# Patient Record
Sex: FEMALE | Race: WHITE | Employment: OTHER | URBAN - METROPOLITAN AREA
[De-identification: names, ages, dates, MRNs, and addresses within clinical notes are randomized per-mention and may not be internally consistent; named-entity substitution may affect disease eponyms.]

---

## 2017-09-18 PROBLEM — I10 ESSENTIAL HYPERTENSION: Status: ACTIVE | Noted: 2017-09-18

## 2017-09-18 PROBLEM — M10.9 GOUT OF MULTIPLE SITES: Status: ACTIVE | Noted: 2017-09-18

## 2017-09-18 PROBLEM — M70.61 TROCHANTERIC BURSITIS, RIGHT HIP: Status: ACTIVE | Noted: 2017-09-18

## 2017-09-18 PROBLEM — E03.9 ACQUIRED HYPOTHYROIDISM: Status: ACTIVE | Noted: 2017-09-18

## 2018-04-09 ENCOUNTER — OFFICE VISIT (OUTPATIENT)
Dept: ORTHOPEDIC SURGERY | Age: 78
End: 2018-04-09
Payer: COMMERCIAL

## 2018-04-09 VITALS — WEIGHT: 190 LBS | BODY MASS INDEX: 34.96 KG/M2 | TEMPERATURE: 97.9 F | HEIGHT: 62 IN

## 2018-04-09 DIAGNOSIS — M70.61 TROCHANTERIC BURSITIS, RIGHT HIP: Primary | ICD-10-CM

## 2018-04-09 DIAGNOSIS — Z96.641 HISTORY OF TOTAL RIGHT HIP ARTHROPLASTY: ICD-10-CM

## 2018-04-09 PROCEDURE — 99214 OFFICE O/P EST MOD 30 MIN: CPT | Performed by: ORTHOPAEDIC SURGERY

## 2018-04-09 PROCEDURE — 20610 DRAIN/INJ JOINT/BURSA W/O US: CPT | Performed by: ORTHOPAEDIC SURGERY

## 2018-04-10 RX ORDER — TRIAMCINOLONE ACETONIDE 40 MG/ML
40 INJECTION, SUSPENSION INTRA-ARTICULAR; INTRAMUSCULAR ONCE
Status: COMPLETED | OUTPATIENT
Start: 2018-04-10 | End: 2018-04-10

## 2018-04-10 RX ADMIN — TRIAMCINOLONE ACETONIDE 40 MG: 40 INJECTION, SUSPENSION INTRA-ARTICULAR; INTRAMUSCULAR at 10:37

## 2018-05-08 ENCOUNTER — OFFICE VISIT (OUTPATIENT)
Dept: ORTHOPEDIC SURGERY | Age: 78
End: 2018-05-08
Payer: COMMERCIAL

## 2018-05-08 VITALS — BODY MASS INDEX: 34.96 KG/M2 | HEIGHT: 62 IN | WEIGHT: 190 LBS

## 2018-05-08 DIAGNOSIS — M70.61 TROCHANTERIC BURSITIS, RIGHT HIP: Primary | ICD-10-CM

## 2018-05-08 DIAGNOSIS — Z96.641 HISTORY OF TOTAL RIGHT HIP ARTHROPLASTY: ICD-10-CM

## 2018-05-08 PROCEDURE — 99213 OFFICE O/P EST LOW 20 MIN: CPT | Performed by: ORTHOPAEDIC SURGERY

## 2018-06-04 ENCOUNTER — OFFICE VISIT (OUTPATIENT)
Dept: ORTHOPEDIC SURGERY | Age: 78
End: 2018-06-04
Payer: COMMERCIAL

## 2018-06-04 DIAGNOSIS — M70.61 TROCHANTERIC BURSITIS, RIGHT HIP: Primary | ICD-10-CM

## 2018-06-04 DIAGNOSIS — Z96.641 HISTORY OF TOTAL RIGHT HIP ARTHROPLASTY: ICD-10-CM

## 2018-06-04 DIAGNOSIS — M48.061 SPINAL STENOSIS OF LUMBAR REGION WITHOUT NEUROGENIC CLAUDICATION: ICD-10-CM

## 2018-06-04 PROCEDURE — 99213 OFFICE O/P EST LOW 20 MIN: CPT | Performed by: ORTHOPAEDIC SURGERY

## 2018-07-03 ENCOUNTER — OFFICE VISIT (OUTPATIENT)
Dept: ORTHOPEDIC SURGERY | Age: 78
End: 2018-07-03
Payer: COMMERCIAL

## 2018-07-03 VITALS — HEIGHT: 62 IN | WEIGHT: 180 LBS | TEMPERATURE: 98 F | BODY MASS INDEX: 33.13 KG/M2

## 2018-07-03 DIAGNOSIS — M48.061 SPINAL STENOSIS OF LUMBAR REGION, UNSPECIFIED WHETHER NEUROGENIC CLAUDICATION PRESENT: Primary | ICD-10-CM

## 2018-07-03 DIAGNOSIS — M47.816 LUMBAR SPONDYLOSIS: ICD-10-CM

## 2018-07-03 PROCEDURE — 99213 OFFICE O/P EST LOW 20 MIN: CPT | Performed by: ORTHOPAEDIC SURGERY

## 2018-07-05 DIAGNOSIS — M48.061 SPINAL STENOSIS OF LUMBAR REGION, UNSPECIFIED WHETHER NEUROGENIC CLAUDICATION PRESENT: Primary | ICD-10-CM

## 2018-07-25 ENCOUNTER — OFFICE VISIT (OUTPATIENT)
Dept: PHYSICAL MEDICINE AND REHAB | Age: 78
End: 2018-07-25
Payer: COMMERCIAL

## 2018-07-25 VITALS
SYSTOLIC BLOOD PRESSURE: 127 MMHG | WEIGHT: 189 LBS | HEART RATE: 64 BPM | BODY MASS INDEX: 34.78 KG/M2 | DIASTOLIC BLOOD PRESSURE: 81 MMHG | HEIGHT: 62 IN

## 2018-07-25 DIAGNOSIS — M48.061 SPINAL STENOSIS OF LUMBAR REGION, UNSPECIFIED WHETHER NEUROGENIC CLAUDICATION PRESENT: ICD-10-CM

## 2018-07-25 DIAGNOSIS — M76.31 ILIOTIBIAL BAND SYNDROME OF RIGHT SIDE: ICD-10-CM

## 2018-07-25 DIAGNOSIS — M70.61 GREATER TROCHANTERIC BURSITIS, RIGHT: Primary | ICD-10-CM

## 2018-07-25 PROCEDURE — 99204 OFFICE O/P NEW MOD 45 MIN: CPT | Performed by: PHYSICAL MEDICINE & REHABILITATION

## 2018-07-25 NOTE — PATIENT INSTRUCTIONS
We appreciate that you chose [facility] to provide your healthcare needs today. We took great pleasure in caring for you. You may receive a survey to help us learn how to make your next visit to a [facility] facility better than the last.   Your feedback is important to help us continually improve the service we can provide you. Please take the time to complete it thoughtfully if you receive one as we value the feedback in every survey. In this survey we would appreciate that you answer \"always\" or \"yes\" for as many questions as possible (this is the answer we get credit for doing a good job). If you feel there is a question you cannot answer \"always\" or \"yes\", please let us know before you leave today so we can remedy the situation right away. We look forward to continuing to provide you with excellent care. Considering the survey questions related to access to care, please keep in mind the urgency of your problem (i.e. was it an emergent or urgent visit or more routine)  and whether the urgency of that problem was handled appropriately by our office. We always do our best to prioritize the patients who need the care most urgently given our specialty is in short supply and there is a high demand for visits. Thank you for your time and consideration.

## 2018-07-25 NOTE — PROGRESS NOTES
Viridiana Rose, [id number] [facility]  [address]  Phone: [phone]  Fax: [phone]    PCP: Matthew Nunes MD  Date of visit: 7/25/18    Chief Complaint   Patient presents with    Back Pain     new patient    Hip Pain       Dear Dr. Sohan Mabry,     Thank you for referring your patient to be seen. As you know,  Nessa Fitzpatrick is a 68 y.o. female with past medical history as below who presents with right hip and leg pain for 8 month(s). There was a gradual onset of pain after no known injury. Now, the pain is intermitten and occurs daily. The pain is rated Pain Score:   6, is described as ache, and is located in the right buttock  with radiation to the right hip into thigh-- only present when walking. The symptoms have been worse since onset. The pain is better with rest.  The pain is worse with walking. There is no associated numbness/tingling. There is no weakness. There is no bowel/bladder changes. Recently got a left heel lift. The prior workup has included: Xray, MRI    The prior treatment has included:  PT: none    Modalities: none    OTC Tylenol: none   NSAIDS: none    Membrane stabilizers: none   Muscle relaxers: none   Previous injections: GT bursa x 2.  Relief with first. Little with second    Previous surgery at this site: none    No Known Allergies    Current Outpatient Prescriptions   Medication Sig Dispense Refill    aspirin (ECOTRIN LOW STRENGTH) 81 MG EC tablet Take by mouth      atenolol (TENORMIN) 25 MG tablet Take 25 mg by mouth 2 times daily       amoxicillin (AMOXIL) 500 MG capsule TAKE FOUR CAPSULES BY MOUTH 1 HOUR BEFORE DENTAL APPOINTMENT  1    levothyroxine (SYNTHROID) 50 MCG tablet Take by mouth      metFORMIN (GLUCOPHAGE) 1000 MG tablet       rosuvastatin (CRESTOR) 5 MG tablet TAKE ONE TABLET BY MOUTH EVERY DAY  3    allopurinol (ZYLOPRIM) 300 MG tablet TAKE ONE TABLET BY MOUTH EVERY distress. HEENT: No rhinorrhea, sneezing, yawning, or lacrimation. No scleral icterus or conjunctival injection. Resp: symmetrical chest expansion, unlabored breathing, respirations unlabored. CV: Heart rate is regular. Peripheral pulses are palpable  Lymph: No visible regional lymphadenopathy. Skin: No rashes or ecchymosis. Normal turgor. Psych: Mood is calm. Affect is normal.   Ext: No edema noted     MSK:   Back/Hip Exam:   Inspection: Pelvis was symmetric. Lumbar lordotic curvature was increased. There was no scoliosis. No ecchymoses or erythema. Palpation: Palpatory exam revealed no tenderness along lumbosacral paraspinals, midline spine, SI joint sulcus. There was concordant tenderness over right GT and TFL. There was no paraspinal spasms. There were no trigger points. ROM: ROM lumbar decreased. Hip ROM normal.   Special/provocative testing:   Supine SLR negative   negative FABERS. DEREK's positive on right  There was a leg length discrepancy. Neurological Exam:  Strength:   R  L  Hip Flex  5  5  Knee Ext  5  5  Ankle dorsi  5  5  EHL   5 5  Ankle Plantar  5  5    Sensory:  Intact for light touch in all lower extremity dermatomes. Reflexes:   R  L  Patellar  (2+) (2+)  Ankle Jerk  (0) (0)      Gait is antalgic. Imaging: (personally reviewed by me 07/25/18)  MRI L spine 6/8/18: There is multilevel intervertebral disc desiccation. There is partial   solid osseous fusion at L5-S1.       T12-L1: No disc herniation. No central canal or foraminal narrowing.       L1-L2: Mild diffuse disc bulge. Mild bilateral facet hypertrophy. No   central canal or foraminal narrowing.       L2-L3: Mild diffuse disc bulge. Mild bilateral facet hypertrophy. No   central canal or foraminal narrowing.       L3-L4: Mild diffuse disc bulge. Moderate bilateral facet hypertrophy. Mild central canal narrowing. No foraminal narrowing.       L4-L5: Diffuse disc bulge.  Moderate bilateral facet hypertrophy with   fluid within the left facet joint. Moderate central canal and   bilateral subarticular recess narrowing. Moderate right and moderate   to severe left foraminal narrowing.       L5-S1: Partial solid osseous fusion. No central canal or foraminal   narrowing.       Visualized paravertebral soft tissues are grossly unremarkable. There   are multiple T2 hyperintense cystic lesions within bilateral kidneys.           Impression   1. Multilevel lumbar spondylosis as described above, most pronounced   at L4-L5, where there is a diffuse disc bulge and facet hypertrophy   resulting in moderate to severe left foraminal narrowing and moderate   central canal and right foraminal narrowing. 2. Mild central canal narrowing at L3-L4. 3. Partial solid osseous fusion at L5-S1. Impression:   Ilene Valdivia is a 68 y.o. female     1. Greater trochanteric bursitis, right    2. Iliotibial band syndrome of right side    3. Spinal stenosis of lumbar region, unspecified whether neurogenic claudication present        Plan:   · Refer to PT   · MRI lumbar reviewed. Depending on response to PT, discussed treatment options such as repeat GT bursa injection using US vs diagnostic epidural.     The patient was educated about the diagnosis, prognosis, indications, risks and benefits of treatment. An opportunity to ask questions was given to the patient and questions were answered. The patient agreed to proceed with the recommended treatment as described above. Follow up in 6 weeks      Thank you for the consultation and for allowing me to participate in the care of this patient.         Sincerely,     Memory DO Charles, [facility]   Board Certified Physical Medicine and Rehabilitation

## 2018-09-10 ENCOUNTER — OFFICE VISIT (OUTPATIENT)
Dept: PHYSICAL MEDICINE AND REHAB | Age: 78
End: 2018-09-10
Payer: COMMERCIAL

## 2018-09-10 VITALS
HEART RATE: 69 BPM | WEIGHT: 191 LBS | BODY MASS INDEX: 35.15 KG/M2 | HEIGHT: 62 IN | SYSTOLIC BLOOD PRESSURE: 154 MMHG | DIASTOLIC BLOOD PRESSURE: 84 MMHG

## 2018-09-10 DIAGNOSIS — M76.31 IT BAND SYNDROME, RIGHT: ICD-10-CM

## 2018-09-10 DIAGNOSIS — M70.61 TROCHANTERIC BURSITIS, RIGHT HIP: Primary | ICD-10-CM

## 2018-09-10 PROCEDURE — 99214 OFFICE O/P EST MOD 30 MIN: CPT | Performed by: PHYSICAL MEDICINE & REHABILITATION

## 2018-09-10 RX ORDER — LOSARTAN POTASSIUM 25 MG/1
25 TABLET ORAL DAILY
COMMUNITY
Start: 2018-09-02 | End: 2021-09-24

## 2018-09-10 NOTE — PROGRESS NOTES
well developed and well nourished in no acute distress. HEENT: No rhinorrhea, sneezing, yawning, or lacrimation. No scleral icterus or conjunctival injection. Resp: symmetrical chest expansion, unlabored breathing, respirations unlabored. CV: Heart rate is regular. Peripheral pulses are palpable  Lymph: No visible regional lymphadenopathy. Skin: No rashes or ecchymosis. Normal turgor. Psych: Mood is calm. Affect is normal.   Ext: No edema noted     MSK:   Back/Hip Exam:   Inspection: Pelvis was symmetric. Lumbar lordotic curvature was increased. There was no scoliosis. No ecchymoses or erythema. Palpation: Palpatory exam revealed no tenderness along lumbosacral paraspinals, midline spine, SI joint sulcus. There was concordant tenderness over right GT and TFL. There was no paraspinal spasms. There were no trigger points. ROM: ROM lumbar decreased. Hip ROM normal.   Special/provocative testing:   Supine SLR negative   negative FABERS. DEREK's positive on right  There was a leg length discrepancy. Neurological Exam:  Strength:   R  L  Hip Flex  5  5  Knee Ext  5  5  Ankle dorsi  5  5  EHL   5 5  Ankle Plantar  5  5    Sensory:  Intact for light touch in all lower extremity dermatomes. Reflexes:   R  L  Patellar  (2+) (2+)  Ankle Jerk  (0) (0)      Gait is antalgic. Imaging: (personally reviewed by me 09/10/18)  MRI L spine 6/8/18: There is multilevel intervertebral disc desiccation. There is partial   solid osseous fusion at L5-S1.       T12-L1: No disc herniation. No central canal or foraminal narrowing.       L1-L2: Mild diffuse disc bulge. Mild bilateral facet hypertrophy. No   central canal or foraminal narrowing.       L2-L3: Mild diffuse disc bulge. Mild bilateral facet hypertrophy. No   central canal or foraminal narrowing.       L3-L4: Mild diffuse disc bulge. Moderate bilateral facet hypertrophy. Mild central canal narrowing.  No foraminal narrowing.       L4-L5: Diffuse disc bulge. Moderate bilateral facet hypertrophy with   fluid within the left facet joint. Moderate central canal and   bilateral subarticular recess narrowing. Moderate right and moderate   to severe left foraminal narrowing.       L5-S1: Partial solid osseous fusion. No central canal or foraminal   narrowing.       Visualized paravertebral soft tissues are grossly unremarkable. There   are multiple T2 hyperintense cystic lesions within bilateral kidneys.           Impression   1. Multilevel lumbar spondylosis as described above, most pronounced   at L4-L5, where there is a diffuse disc bulge and facet hypertrophy   resulting in moderate to severe left foraminal narrowing and moderate   central canal and right foraminal narrowing. 2. Mild central canal narrowing at L3-L4. 3. Partial solid osseous fusion at L5-S1. Impression:   Reji Thapa is a 66 y.o. female     1. Trochanteric bursitis, right hip    2. It band syndrome, right        Plan:   · Continue PT-- added for them to do dry needling and US. Will monitor response. If still no improvement, will consider diagnostic epidural.       The patient was educated about the diagnosis, prognosis, indications, risks and benefits of treatment. An opportunity to ask questions was given to the patient and questions were answered. The patient agreed to proceed with the recommended treatment as described above.      · Follow up in 2-3 weeks       Layton Valdivia DO, [facility]   Board Certified Physical Medicine and Rehabilitation

## 2018-09-10 NOTE — PATIENT INSTRUCTIONS
We appreciate that you chose [facility] to provide your healthcare needs today. We took great pleasure in caring for you. You may receive a survey to help us learn how to make your next visit to a [facility] facility better than the last.   Your feedback is important to help us continually improve the service we can provide you. Please take the time to complete it thoughtfully if you receive one as we value the feedback in every survey. In this survey we would appreciate that you answer \"always\" or \"yes\" for as many questions as possible (this is the answer we get credit for doing a good job). If you feel there is a question you cannot answer \"always\" or \"yes\", please let us know before you leave today so we can remedy the situation right away. We look forward to continuing to provide you with excellent care. Considering the survey questions related to access to care, please keep in mind the urgency of your problem (i.e. was it an emergent or urgent visit or more routine)  and whether the urgency of that problem was handled appropriately by our office. We always do our best to prioritize the patients who need the care most urgently given our specialty is in short supply and there is a high demand for visits. Thank you for your time and consideration.

## 2018-09-24 ENCOUNTER — OFFICE VISIT (OUTPATIENT)
Dept: PHYSICAL MEDICINE AND REHAB | Age: 78
End: 2018-09-24
Payer: COMMERCIAL

## 2018-09-24 VITALS
SYSTOLIC BLOOD PRESSURE: 175 MMHG | HEART RATE: 65 BPM | WEIGHT: 188 LBS | BODY MASS INDEX: 34.6 KG/M2 | HEIGHT: 62 IN | DIASTOLIC BLOOD PRESSURE: 81 MMHG

## 2018-09-24 DIAGNOSIS — M70.61 TROCHANTERIC BURSITIS, RIGHT HIP: Primary | ICD-10-CM

## 2018-09-24 PROCEDURE — 20611 DRAIN/INJ JOINT/BURSA W/US: CPT | Performed by: PHYSICAL MEDICINE & REHABILITATION

## 2018-09-24 RX ORDER — BUPIVACAINE HYDROCHLORIDE 2.5 MG/ML
4 INJECTION, SOLUTION INFILTRATION; PERINEURAL ONCE
Status: COMPLETED | OUTPATIENT
Start: 2018-09-24 | End: 2018-09-24

## 2018-09-24 RX ORDER — TRIAMCINOLONE ACETONIDE 40 MG/ML
40 INJECTION, SUSPENSION INTRA-ARTICULAR; INTRAMUSCULAR ONCE
Status: COMPLETED | OUTPATIENT
Start: 2018-09-24 | End: 2018-09-24

## 2018-09-24 RX ADMIN — BUPIVACAINE HYDROCHLORIDE 10 MG: 2.5 INJECTION, SOLUTION INFILTRATION; PERINEURAL at 10:26

## 2018-09-24 RX ADMIN — TRIAMCINOLONE ACETONIDE 40 MG: 40 INJECTION, SUSPENSION INTRA-ARTICULAR; INTRAMUSCULAR at 10:27

## 2018-09-24 NOTE — PROGRESS NOTES
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 No pertinent family history. Social History     Social History    Marital status:      Spouse name: N/A    Number of children: N/A    Years of education: N/A     Occupational History    Not on file. Social History Main Topics    Smoking status: Never Smoker    Smokeless tobacco: Never Used    Alcohol use No    Drug use: No    Sexual activity: Not on file     Other Topics Concern    Not on file     Social History Narrative    No narrative on file       Functional Status: The patient is able to ambulate and perform activities of daily living with the use of an assistive device- straight cane. ROS:    Constitutional: Denies fevers, chills, night sweats, unintentional weight loss     Skin: Denies rash or skin changes     Eyes: Denies vision changes    Ears/Nose/Throat: Denies nasal congestion or sore throat     Respiratory: Denies SOB or cough     Cardiovascular: Denies CP, palpitations, edema      Gastrointestinal: Denies abdominal pain,  N/V, constipation, or diarrhea    Genitourinary: Denies urinary symptoms    Neurologic: See HPI.     MSK: See HPI.      Psychiatric: Denies sleep disturbance, anxiety, depression foraminal narrowing.       L3-L4: Mild diffuse disc bulge. Moderate bilateral facet hypertrophy. Mild central canal narrowing. No foraminal narrowing.       L4-L5: Diffuse disc bulge. Moderate bilateral facet hypertrophy with   fluid within the left facet joint. Moderate central canal and   bilateral subarticular recess narrowing. Moderate right and moderate   to severe left foraminal narrowing.       L5-S1: Partial solid osseous fusion. No central canal or foraminal   narrowing.       Visualized paravertebral soft tissues are grossly unremarkable. There   are multiple T2 hyperintense cystic lesions within bilateral kidneys.           Impression   1. Multilevel lumbar spondylosis as described above, most pronounced   at L4-L5, where there is a diffuse disc bulge and facet hypertrophy   resulting in moderate to severe left foraminal narrowing and moderate   central canal and right foraminal narrowing. 2. Mild central canal narrowing at L3-L4. 3. Partial solid osseous fusion at L5-S1. Impression:   Kwabena Sawyer is a 66 y.o. female     1. Trochanteric bursitis, right hip        Plan:   · Right greater trochanteric bursa injection under ultrasound guidance in office today. · Continue PT  · Start voltaren gel QID PRN       The patient was educated about the diagnosis, prognosis, indications, risks and benefits of treatment. An opportunity to ask questions was given to the patient and questions were answered. The patient agreed to proceed with the recommended treatment as described above. · Follow up in 1 month       Willard Suarez DO [facility]   Board Certified Physical Medicine and Rehabilitation      After explaining the indications, risks, benefits and alternatives of a right greater trochanteric bursa injection, the patient agreed to proceed. A permit was signed and scanned into the chart. The skin on the lateral hip was prepared with Chloraprep.   Using aseptic, no touch technique, a 22 gauge, 3.5\" needle with 1 cc of Kenalog 40mg/cc and 4 cc of 0.25% bupivacaine was directed to the bursa using US guidance. After negative aspiration, the medication was injected into the hip joint. Adequate hemostasis was achieved and a bandage was applied. The patient was monitored clinically after the injection and left the office without incident. The patient tolerated the procedure well and was educated in post injection care. There was post injection reduction in pain. US images are uploaded separately.

## 2018-09-24 NOTE — PATIENT INSTRUCTIONS
We appreciate that you chose [facility] to provide your healthcare needs today. We took great pleasure in caring for you. You may receive a survey to help us learn how to make your next visit to a [facility] facility better than the last.   Your feedback is important to help us continually improve the service we can provide you. Please take the time to complete it thoughtfully if you receive one as we value the feedback in every survey. In this survey we would appreciate that you answer \"always\" or \"yes\" for as many questions as possible (this is the answer we get credit for doing a good job). If you feel there is a question you cannot answer \"always\" or \"yes\", please let us know before you leave today so we can remedy the situation right away. We look forward to continuing to provide you with excellent care. Considering the survey questions related to access to care, please keep in mind the urgency of your problem (i.e. was it an emergent or urgent visit or more routine)  and whether the urgency of that problem was handled appropriately by our office. We always do our best to prioritize the patients who need the care most urgently given our specialty is in short supply and there is a high demand for visits. Thank you for your time and consideration.

## 2018-10-23 ENCOUNTER — OFFICE VISIT (OUTPATIENT)
Dept: PHYSICAL MEDICINE AND REHAB | Age: 78
End: 2018-10-23
Payer: COMMERCIAL

## 2018-10-23 VITALS
HEIGHT: 62 IN | BODY MASS INDEX: 34.41 KG/M2 | HEART RATE: 65 BPM | SYSTOLIC BLOOD PRESSURE: 137 MMHG | DIASTOLIC BLOOD PRESSURE: 82 MMHG | WEIGHT: 187 LBS

## 2018-10-23 DIAGNOSIS — M70.61 GREATER TROCHANTERIC BURSITIS, RIGHT: Primary | ICD-10-CM

## 2018-10-23 DIAGNOSIS — M76.31 ILIOTIBIAL BAND SYNDROME OF RIGHT SIDE: ICD-10-CM

## 2018-10-23 PROCEDURE — 99212 OFFICE O/P EST SF 10 MIN: CPT | Performed by: PHYSICAL MEDICINE & REHABILITATION

## 2018-10-23 NOTE — PROGRESS NOTES
Nikos Mcmanus, [address] [facility]  [address] [facility] [address]  Phone: [phone]  Fax: [phone]    PCP: Ghanshyam Louis MD  Date of visit: 10/23/18    Chief Complaint   Patient presents with    Hip Pain     1 month follow up    Leg Pain       Interval:   Patient presents for follow up visit today. Last visit she had a GT bursa injection with US. She reports complete relief of her pain. She reports no more aching in the leg. She completed PT with relief. The pain is rated Pain Score:   1 when it is there. No other issues or complaints today. The prior workup has included: Xray, MRI    The prior treatment has included:  PT: completed with relief    Modalities: none    OTC Tylenol: none   NSAIDS: none    Membrane stabilizers: none   Muscle relaxers: none   Previous injections: GT bursa x 2. Relief with first. GT under US- 100% relief     Previous surgery at this site: none    No Known Allergies    Current Outpatient Prescriptions   Medication Sig Dispense Refill    diclofenac sodium (VOLTAREN) 1 % GEL Apply 4 g topically 4 times daily as needed (pain) 480 g 2    losartan (COZAAR) 25 MG tablet Take 25 mg by mouth daily      aspirin (ECOTRIN LOW STRENGTH) 81 MG EC tablet Take by mouth      atenolol (TENORMIN) 25 MG tablet Take 25 mg by mouth 2 times daily       amoxicillin (AMOXIL) 500 MG capsule TAKE FOUR CAPSULES BY MOUTH 1 HOUR BEFORE DENTAL APPOINTMENT  1    levothyroxine (SYNTHROID) 50 MCG tablet Take by mouth      metFORMIN (GLUCOPHAGE) 1000 MG tablet       rosuvastatin (CRESTOR) 5 MG tablet TAKE ONE TABLET BY MOUTH EVERY DAY  3    allopurinol (ZYLOPRIM) 300 MG tablet TAKE ONE TABLET BY MOUTH EVERY DAY  3     No current facility-administered medications for this visit.         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

## 2019-01-08 NOTE — PROGRESS NOTES
How Severe Is Your Acne?: mild Is This A New Presentation, Or A Follow-Up?: Acne Additional Comments (Use Complete Sentences): Pt presents today with cyst acne in back. States going to urgent care and was treated with DOXYCYCLINE for 14 days cleared but returned. there is not a deformity noted. Patient complains of tenderness at the  groin. Exam of the right hip shows right leg 1 cm longer than left discrepancy. Range of motion of the involved/uninvolved hip is 20 degrees internal rotation and 30 degrees external rotation/20 degrees internal rotation and 30 degrees external rotation, the hip joint is stable to testing. Strength of the lower extremity is limited by pain. The patient does not have ipsilateral knee pain, and is described as  diffuse. Hip exam- Gait: antalgic; Strength: Hip Flexors 5/5; Hip Abductors 5/5; Hip Adduction 5/5. Knee exam :    Upon visual inspection there is not deformity noted. She does not have  pain on motion, there is not tenderness over the  global region. Range of motion of R. Knee is 0 to 120, and L. Knee is 0 to 120. there are not any masses, there is not ligamentous instability, there is not  deformity noted. MRI:  Lumbar spine-      1. Multilevel lumbar spondylosis as described above, most pronounced   at L4-L5, where there is a diffuse disc bulge and facet hypertrophy   resulting in moderate to severe left foraminal narrowing and moderate   central canal and right foraminal narrowing. 2. Mild central canal narrowing at L3-L4. 3. Partial solid osseous fusion at L5-S1. Assessment:  Encounter Diagnoses   Name Primary?  Spinal stenosis of lumbar region, unspecified whether neurogenic claudication present Yes    Lumbar spondylosis          Plan:  I discussed the treatment options with the patient. She would like to continue to monitor her condition. If she does not improve, I can refer her to Dr. [place] Dod[place] and Dr. Jose Juan Lara for further evaluation.

## 2020-02-19 ENCOUNTER — HOSPITAL ENCOUNTER (OUTPATIENT)
Age: 80
Discharge: HOME | End: 2020-02-21
Payer: COMMERCIAL

## 2020-02-19 LAB
ALBUMIN SERPL-MCNC: 4.2 G/DL (ref 3.5–5.2)
ALP BLD-CCNC: 50 U/L (ref 35–104)
ALT SERPL-CCNC: 24 U/L (ref 0–32)
ANION GAP SERPL CALCULATED.3IONS-SCNC: 15 MMOL/L (ref 7–16)
AST SERPL-CCNC: 25 U/L (ref 0–31)
BILIRUB SERPL-MCNC: 0.2 MG/DL (ref 0–1.2)
BUN BLDV-MCNC: 28 MG/DL (ref 8–23)
CALCIUM SERPL-MCNC: 9.8 MG/DL (ref 8.6–10.2)
CHLORIDE BLD-SCNC: 100 MMOL/L (ref 98–107)
CO2: 24 MMOL/L (ref 22–29)
CREAT SERPL-MCNC: 1.2 MG/DL (ref 0.5–1)
GFR AFRICAN AMERICAN: 52
GFR NON-AFRICAN AMERICAN: 43 ML/MIN/1.73
GLUCOSE BLD-MCNC: 126 MG/DL (ref 74–99)
POTASSIUM SERPL-SCNC: 4.6 MMOL/L (ref 3.5–5)
SODIUM BLD-SCNC: 139 MMOL/L (ref 132–146)
TOTAL PROTEIN: 7.6 G/DL (ref 6.4–8.3)

## 2020-02-19 PROCEDURE — 80053 COMPREHEN METABOLIC PANEL: CPT

## 2020-02-19 PROCEDURE — 83036 HEMOGLOBIN GLYCOSYLATED A1C: CPT

## 2020-02-20 LAB — HBA1C MFR BLD: 7 % (ref 4–5.6)

## 2021-09-24 ENCOUNTER — OFFICE VISIT (OUTPATIENT)
Dept: FAMILY MEDICINE CLINIC | Age: 81
End: 2021-09-24
Payer: COMMERCIAL

## 2021-09-24 VITALS
DIASTOLIC BLOOD PRESSURE: 75 MMHG | OXYGEN SATURATION: 98 % | SYSTOLIC BLOOD PRESSURE: 130 MMHG | WEIGHT: 188 LBS | HEART RATE: 57 BPM | BODY MASS INDEX: 34.6 KG/M2 | TEMPERATURE: 96.8 F | HEIGHT: 62 IN

## 2021-09-24 DIAGNOSIS — R73.9 HYPERGLYCEMIA: ICD-10-CM

## 2021-09-24 DIAGNOSIS — I10 ESSENTIAL HYPERTENSION: Primary | ICD-10-CM

## 2021-09-24 DIAGNOSIS — E03.9 ACQUIRED HYPOTHYROIDISM: ICD-10-CM

## 2021-09-24 DIAGNOSIS — E78.5 HYPERLIPIDEMIA, UNSPECIFIED HYPERLIPIDEMIA TYPE: ICD-10-CM

## 2021-09-24 DIAGNOSIS — Z76.89 ENCOUNTER TO ESTABLISH CARE: ICD-10-CM

## 2021-09-24 PROCEDURE — 99204 OFFICE O/P NEW MOD 45 MIN: CPT | Performed by: FAMILY MEDICINE

## 2021-09-24 RX ORDER — AMLODIPINE BESYLATE 5 MG/1
5 TABLET ORAL DAILY
Qty: 90 TABLET | Refills: 0 | Status: SHIPPED
Start: 2021-09-24 | End: 2022-03-04 | Stop reason: SDUPTHER

## 2021-09-24 RX ORDER — ROSUVASTATIN CALCIUM 5 MG/1
TABLET, COATED ORAL
Qty: 90 TABLET | Refills: 0 | Status: SHIPPED
Start: 2021-09-24 | End: 2022-03-04 | Stop reason: SDUPTHER

## 2021-09-24 RX ORDER — LEVOTHYROXINE SODIUM 0.05 MG/1
50 TABLET ORAL DAILY
Qty: 90 TABLET | Refills: 0 | Status: SHIPPED
Start: 2021-09-24 | End: 2021-12-06 | Stop reason: SDUPTHER

## 2021-09-24 RX ORDER — AMLODIPINE BESYLATE 5 MG/1
5 TABLET ORAL DAILY
COMMUNITY
Start: 2021-07-26 | End: 2021-09-24 | Stop reason: SDUPTHER

## 2021-09-24 SDOH — ECONOMIC STABILITY: FOOD INSECURITY: WITHIN THE PAST 12 MONTHS, THE FOOD YOU BOUGHT JUST DIDN'T LAST AND YOU DIDN'T HAVE MONEY TO GET MORE.: NEVER TRUE

## 2021-09-24 SDOH — ECONOMIC STABILITY: FOOD INSECURITY: WITHIN THE PAST 12 MONTHS, YOU WORRIED THAT YOUR FOOD WOULD RUN OUT BEFORE YOU GOT MONEY TO BUY MORE.: NEVER TRUE

## 2021-09-24 ASSESSMENT — ENCOUNTER SYMPTOMS
ALLERGIC/IMMUNOLOGIC NEGATIVE: 1
GASTROINTESTINAL NEGATIVE: 1
EYES NEGATIVE: 1
RESPIRATORY NEGATIVE: 1

## 2021-09-24 ASSESSMENT — PATIENT HEALTH QUESTIONNAIRE - PHQ9
SUM OF ALL RESPONSES TO PHQ9 QUESTIONS 1 & 2: 0
SUM OF ALL RESPONSES TO PHQ QUESTIONS 1-9: 0
SUM OF ALL RESPONSES TO PHQ QUESTIONS 1-9: 0
2. FEELING DOWN, DEPRESSED OR HOPELESS: 0
SUM OF ALL RESPONSES TO PHQ QUESTIONS 1-9: 0
1. LITTLE INTEREST OR PLEASURE IN DOING THINGS: 0

## 2021-09-24 ASSESSMENT — SOCIAL DETERMINANTS OF HEALTH (SDOH): HOW HARD IS IT FOR YOU TO PAY FOR THE VERY BASICS LIKE FOOD, HOUSING, MEDICAL CARE, AND HEATING?: NOT HARD AT ALL

## 2021-09-24 NOTE — PATIENT INSTRUCTIONS
Continue present treatment  Low-sodium low-fat diabetic diet  Regular exercises  Get the lab work done today  Return to clinic earlier if any problems

## 2021-09-24 NOTE — PROGRESS NOTES
OFFICE PROGRESS NOTE      SUBJECTIVE:        Patient ID:   Pierce Zavala is a 80 y.o. female who presents for   Chief Complaint   Patient presents with   [facility]     Here to establish self as a patient. HPI:   Patient states is feeling okay  Previous records been reviewed  Patient did not have the lab work in a while  No specific complaints today  Pressure is stable        Prior to Visit Medications    Medication Sig Taking?  Authorizing Provider   amLODIPine (NORVASC) 5 MG tablet Take 5 mg by mouth daily Yes Historical Provider, MD   diclofenac sodium (VOLTAREN) 1 % GEL Apply 4 g topically 4 times daily as needed (pain) Yes Connie Mon, DO   aspirin (ECOTRIN LOW STRENGTH) 81 MG EC tablet Take by mouth Yes Historical Provider, MD   atenolol (TENORMIN) 25 MG tablet Take 25 mg by mouth 2 times daily  Yes Historical Provider, MD   levothyroxine (SYNTHROID) 50 MCG tablet Take 50 mcg by mouth Daily  Yes Historical Provider, MD   metFORMIN (GLUCOPHAGE) 1000 MG tablet 1,000 mg 2 times daily (with meals)  Yes Historical Provider, MD   rosuvastatin (CRESTOR) 5 MG tablet TAKE ONE TABLET BY MOUTH EVERY DAY Yes Historical Provider, MD      Social History     Socioeconomic History    Marital status:      Spouse name: None    Number of children: None    Years of education: None    Highest education level: None   Occupational History    None   Tobacco Use    Smoking status: Never Smoker    Smokeless tobacco: Never Used   Substance and Sexual Activity    Alcohol use: No    Drug use: No    Sexual activity: None   Other Topics Concern    None   Social History Narrative    None     Social Determinants of Health     Financial Resource Strain: Low Risk     Difficulty of Paying Living Expenses: Not hard at all   Food Insecurity: No Food Insecurity    Worried About Running Out of Food in the Last Year: Never true    Rashawn of Food in the Last Year: Never true   Transportation Needs:     Lack of Transportation (Medical):  Lack of Transportation (Non-Medical):    Physical Activity:     Days of Exercise per Week:     Minutes of Exercise per Session:    Stress:     Feeling of Stress :    Social Connections:     Frequency of Communication with Friends and Family:     Frequency of Social Gatherings with Friends and Family:     Attends Mu-ism Services:     Active Member of Clubs or Organizations:     Attends Club or Organization Meetings:     Marital Status:    Intimate Partner Violence:     Fear of Current or Ex-Partner:     Emotionally Abused:     Physically Abused:     Sexually Abused:        I have reviewed Bernadette's allergies, medications, problem list, medical, social and family history and have updated as needed in the electronic medical record  Review Of Systems:    Review of Systems   Constitutional: Negative. HENT: Negative. Eyes: Negative. Respiratory: Negative. Cardiovascular: Negative. Gastrointestinal: Negative. Endocrine: Negative. Musculoskeletal: Negative. Skin: Negative. Allergic/Immunologic: Negative. Neurological: Negative. Hematological: Negative. Psychiatric/Behavioral: Negative. OBJECTIVE:     VS:  Wt Readings from Last 3 Encounters:   09/24/21 188 lb (85.3 kg)   10/23/18 187 lb (84.8 kg)   09/24/18 188 lb (85.3 kg)     Temp Readings from Last 3 Encounters:   09/24/21 96.8 °F (36 °C) (Infrared)   07/03/18 98 °F (36.7 °C)   04/09/18 97.9 °F (36.6 °C)     BP Readings from Last 3 Encounters:   09/24/21 130/75   10/23/18 137/82   09/24/18 (!) 175/81        Physical Exam  Constitutional:       Appearance: She is well-developed. HENT:      Head: Normocephalic and atraumatic. Eyes:      Conjunctiva/sclera: Conjunctivae normal.      Pupils: Pupils are equal, round, and reactive to light. Cardiovascular:      Rate and Rhythm: Normal rate and regular rhythm.       Heart sounds: Normal heart sounds. Pulmonary:      Effort: Pulmonary effort is normal.      Breath sounds: Normal breath sounds. Abdominal:      General: Bowel sounds are normal.      Palpations: Abdomen is soft. Musculoskeletal:         General: Normal range of motion. Cervical back: Normal range of motion and neck supple. Skin:     General: Skin is warm and dry. Neurological:      Mental Status: She is alert and oriented to person, place, and time. Psychiatric:         Thought Content: Thought content normal.            Labs :    Lab Results   Component Value Date    ALT 24 02/19/2020    AST 25 02/19/2020     02/19/2020    K 4.6 02/19/2020     02/19/2020    CREATININE 1.2 (H) 02/19/2020    BUN 28 (H) 02/19/2020    CO2 24 02/19/2020    LABA1C 7.0 (H) 02/19/2020     No results found for: VOL, APPEARANCE, COLORU, LABSPEC, LABPH, LEUKBLD, NITRU, GLUCOSEU, KETUA, UROBILINOGEN, KETUA, UROBILINOGEN, BILIRUBINUR, OCBU  No results found for: PSA, CEA, , [id number],       Controlled Substances Monitoring:                                    ASSESSMENT     Patient Active Problem List    Diagnosis Date Noted    Lumbar spondylosis 07/03/2018    Spinal stenosis of lumbar region 07/03/2018    Essential hypertension 09/18/2017    Gout of multiple sites 09/18/2017    Acquired hypothyroidism 09/18/2017    Trochanteric bursitis, right hip 09/18/2017    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 Essential hypertension  I10    2. Encounter to establish care  Z76.89    3. Acquired hypothyroidism  E03.9 TSH   4. Hyperlipidemia, unspecified hyperlipidemia type  E78.5 CBC WITH AUTO DIFFERENTIAL     COMPREHENSIVE METABOLIC PANEL     LIPID PANEL   5.  Hyperglycemia  R73.9 HEMOGLOBIN A1C       PLAN:     Continue present treatment  Low-sodium low-fat diabetic diet  Regular exercises  Lab work before the next visit  Return to clinic earlier if any problems      Patient Instructions   Continue present treatment  Low-sodium low-fat diabetic diet  Regular exercises  Get the lab work done today  Return to clinic earlier if any problems      Return in about 2 weeks (around 10/8/2021). Joe uMrray reviewed my findings and recommendations with Lory Norris.     Electronicallysigned by Grecia Briscoe MD on 9/24/21 at 11:34 AM EDT

## 2021-10-22 ENCOUNTER — OFFICE VISIT (OUTPATIENT)
Dept: FAMILY MEDICINE CLINIC | Age: 81
End: 2021-10-22
Payer: COMMERCIAL

## 2021-10-22 VITALS
OXYGEN SATURATION: 97 % | TEMPERATURE: 97 F | BODY MASS INDEX: 34.85 KG/M2 | SYSTOLIC BLOOD PRESSURE: 136 MMHG | DIASTOLIC BLOOD PRESSURE: 80 MMHG | HEART RATE: 81 BPM | WEIGHT: 189 LBS

## 2021-10-22 DIAGNOSIS — E78.5 HYPERLIPIDEMIA, UNSPECIFIED HYPERLIPIDEMIA TYPE: ICD-10-CM

## 2021-10-22 DIAGNOSIS — E03.9 ACQUIRED HYPOTHYROIDISM: ICD-10-CM

## 2021-10-22 DIAGNOSIS — R73.9 HYPERGLYCEMIA: ICD-10-CM

## 2021-10-22 DIAGNOSIS — I10 ESSENTIAL HYPERTENSION: Primary | ICD-10-CM

## 2021-10-22 PROCEDURE — 99214 OFFICE O/P EST MOD 30 MIN: CPT | Performed by: FAMILY MEDICINE

## 2021-10-22 ASSESSMENT — ENCOUNTER SYMPTOMS
EYES NEGATIVE: 1
ALLERGIC/IMMUNOLOGIC NEGATIVE: 1
RESPIRATORY NEGATIVE: 1
BACK PAIN: 1
GASTROINTESTINAL NEGATIVE: 1

## 2021-10-22 NOTE — PATIENT INSTRUCTIONS
Continue present treatment  Low-sodium low-fat diabetic diet  Weight reduction  Lab work before the next visit  Return to clinic earlier if any problems

## 2021-10-22 NOTE — PROGRESS NOTES
OFFICE PROGRESS NOTE      SUBJECTIVE:        Patient ID:   Clem Yeh is a 80 y.o. female who presents for   Chief Complaint   Patient presents with    Hypertension     Here for recheck on Hypertension,Hyperlipidemia,Hypothyroidism and DM. Patient reports feeling well without any complaints. Lab work done,here for review. HPI:   Lab showed elevated lipids  CMP normal  Thyroid is normal  Patient not following the diet  Does not exercise exercise        Prior to Visit Medications    Medication Sig Taking?  Authorizing Provider   rosuvastatin (CRESTOR) 5 MG tablet TAKE ONE TABLET BY MOUTH EVERY DAY Yes Agustin Mccain MD   metFORMIN (GLUCOPHAGE) 1000 MG tablet Take 1 tablet by mouth 2 times daily (with meals) Yes Agustin Mccain MD   levothyroxine (SYNTHROID) 50 MCG tablet Take 1 tablet by mouth Daily Yes Agustin Mccain MD   amLODIPine (NORVASC) 5 MG tablet Take 1 tablet by mouth daily Yes Agustin Mccain MD   diclofenac sodium (VOLTAREN) 1 % GEL Apply 4 g topically 4 times daily as needed (pain) Yes Elvie Foley DO   aspirin (ECOTRIN LOW STRENGTH) 81 MG EC tablet Take by mouth Yes Historical Provider, MD   atenolol (TENORMIN) 25 MG tablet Take 25 mg by mouth 2 times daily  Yes Historical Provider, MD      Social History     Socioeconomic History    Marital status:      Spouse name: None    Number of children: None    Years of education: None    Highest education level: None   Occupational History    None   Tobacco Use    Smoking status: Never Smoker    Smokeless tobacco: Never Used   Substance and Sexual Activity    Alcohol use: No    Drug use: No    Sexual activity: None   Other Topics Concern    None   Social History Narrative    None     Social Determinants of Health     Financial Resource Strain: Low Risk     Difficulty of Paying Living Expenses: Not hard at all   Food Insecurity: No Food Insecurity    Worried About Running Out of Food in the Last Year: Never true    Ran Out of Food in the Last Year: Never true   Transportation Needs:     Lack of Transportation (Medical):  Lack of Transportation (Non-Medical):    Physical Activity:     Days of Exercise per Week:     Minutes of Exercise per Session:    Stress:     Feeling of Stress :    Social Connections:     Frequency of Communication with Friends and Family:     Frequency of Social Gatherings with Friends and Family:     Attends Hinduism Services:     Active Member of Clubs or Organizations:     Attends Club or Organization Meetings:     Marital Status:    Intimate Partner Violence:     Fear of Current or Ex-Partner:     Emotionally Abused:     Physically Abused:     Sexually Abused:        I have reviewed Bernadette's allergies, medications, problem list, medical, social and family history and have updated as needed in the electronic medical record  Review Of Systems:    Review of Systems   Constitutional: Negative. HENT: Negative. Eyes: Negative. Respiratory: Negative. Cardiovascular: Negative. Gastrointestinal: Negative. Endocrine: Negative. Musculoskeletal: Positive for back pain. Skin: Negative. Allergic/Immunologic: Negative. Neurological: Negative. Hematological: Negative. Psychiatric/Behavioral: Negative. OBJECTIVE:     VS:  Wt Readings from Last 3 Encounters:   10/22/21 189 lb (85.7 kg)   09/24/21 188 lb (85.3 kg)   10/23/18 187 lb (84.8 kg)     Temp Readings from Last 3 Encounters:   10/22/21 97 °F (36.1 °C) (Infrared)   09/24/21 96.8 °F (36 °C) (Infrared)   07/03/18 98 °F (36.7 °C)     BP Readings from Last 3 Encounters:   10/22/21 136/80   09/24/21 130/75   10/23/18 137/82        Physical Exam  Constitutional:       Appearance: She is well-developed. HENT:      Head: Normocephalic and atraumatic.    Eyes:      Conjunctiva/sclera: Conjunctivae normal.      Pupils: Pupils are equal, round, and reactive to light. Cardiovascular:      Rate and Rhythm: Normal rate and regular rhythm. Heart sounds: Normal heart sounds. Pulmonary:      Effort: Pulmonary effort is normal.      Breath sounds: Normal breath sounds. Abdominal:      General: Bowel sounds are normal.      Palpations: Abdomen is soft. Musculoskeletal:         General: Normal range of motion. Cervical back: Normal range of motion and neck supple. Skin:     General: Skin is warm and dry. Neurological:      Mental Status: She is alert and oriented to person, place, and time. Psychiatric:         Thought Content: Thought content normal.            Labs :    Lab Results   Component Value Date    WBC 9.6 09/24/2021    HGB 14.5 09/24/2021    HCT 45.9 09/24/2021     09/24/2021    CHOL 193 09/24/2021    TRIG 287 (H) 09/24/2021    HDL 37 09/24/2021    ALT 37 (H) 09/24/2021    AST 44 (H) 09/24/2021     09/24/2021    K 5.4 (H) 09/24/2021     09/24/2021    CREATININE 1.3 (H) 09/24/2021    BUN 27 (H) 09/24/2021    CO2 25 09/24/2021    TSH 2.380 09/24/2021    LABA1C 7.7 (H) 09/24/2021     No results found for: VOL, APPEARANCE, COLORU, LABSPEC, LABPH, LEUKBLD, NITRU, GLUCOSEU, KETUA, UROBILINOGEN, KETUA, UROBILINOGEN, BILIRUBINUR, OCBU  No results found for: PSA, CEA, , [id number],       Controlled Substances Monitoring:                                    ASSESSMENT     Patient Active Problem List    Diagnosis Date Noted    Lumbar spondylosis 07/03/2018    Spinal stenosis of lumbar region 07/03/2018    Essential hypertension 09/18/2017    Gout of multiple sites 09/18/2017    Acquired hypothyroidism 09/18/2017    Trochanteric bursitis, right hip 09/18/2017    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 Essential hypertension  I10 CBC WITH AUTO DIFFERENTIAL   2. Acquired hypothyroidism  E03.9    3.  Hyperlipidemia, unspecified hyperlipidemia type  E78.5 CBC WITH AUTO DIFFERENTIAL     COMPREHENSIVE METABOLIC PANEL     LIPID PANEL   4. Hyperglycemia  R73.9 CBC WITH AUTO DIFFERENTIAL       PLAN:   Continue present treatment  Strict low-sodium low-fat diabetic diet  Regular exercises  Lab work before the next visit  Return to clinic earlier if any problems        Patient Instructions   Continue present treatment  Low-sodium low-fat diabetic diet  Weight reduction  Lab work before the next visit  Return to clinic earlier if any problems      Return in about 6 weeks (around 12/3/2021). Dayo Swanson reviewed my findings and recommendations with José Villegas.     Electronicallysigned by Marquita Bose MD on 10/22/21 at 11:44 AM EDT

## 2021-12-06 RX ORDER — LEVOTHYROXINE SODIUM 0.05 MG/1
50 TABLET ORAL DAILY
Qty: 90 TABLET | Refills: 0 | Status: SHIPPED
Start: 2021-12-06 | End: 2022-03-04 | Stop reason: SDUPTHER

## 2021-12-06 NOTE — TELEPHONE ENCOUNTER
----- Message from Kylah Lora sent at 12/6/2021  3:07 PM EST -----  Subject: Refill Request    QUESTIONS  Name of Medication? levothyroxine (SYNTHROID) 50 MCG tablet  Patient-reported dosage and instructions? 50MCG Tablets-- Take one by   mouth Three days per week and two by mouth four days per week   How many days do you have left? 5  Preferred Pharmacy? [address] [place] [address]  Pharmacy phone number (if available)? [phone]  ---------------------------------------------------------------------------  --------------  CALL BACK INFO  What is the best way for the office to contact you? OK to leave message on   voicemail  Preferred Call Back Phone Number?  [phone]

## 2021-12-07 RX ORDER — ATENOLOL 25 MG/1
25 TABLET ORAL 2 TIMES DAILY
Qty: 180 TABLET | Refills: 0 | Status: SHIPPED
Start: 2021-12-07 | End: 2022-03-04 | Stop reason: SDUPTHER

## 2021-12-29 DIAGNOSIS — E78.5 HYPERLIPIDEMIA, UNSPECIFIED HYPERLIPIDEMIA TYPE: ICD-10-CM

## 2021-12-29 DIAGNOSIS — I10 ESSENTIAL HYPERTENSION: ICD-10-CM

## 2021-12-29 DIAGNOSIS — R73.9 HYPERGLYCEMIA: ICD-10-CM

## 2021-12-29 LAB
ALBUMIN SERPL-MCNC: 4.4 G/DL (ref 3.5–5.2)
ALP BLD-CCNC: 50 U/L (ref 35–104)
ALT SERPL-CCNC: 31 U/L (ref 0–32)
ANION GAP SERPL CALCULATED.3IONS-SCNC: 16 MMOL/L (ref 7–16)
AST SERPL-CCNC: 32 U/L (ref 0–31)
BASOPHILS ABSOLUTE: 0.07 E9/L (ref 0–0.2)
BASOPHILS RELATIVE PERCENT: 0.8 % (ref 0–2)
BILIRUB SERPL-MCNC: 0.4 MG/DL (ref 0–1.2)
BUN BLDV-MCNC: 27 MG/DL (ref 6–23)
CALCIUM SERPL-MCNC: 10.5 MG/DL (ref 8.6–10.2)
CHLORIDE BLD-SCNC: 104 MMOL/L (ref 98–107)
CHOLESTEROL, TOTAL: 180 MG/DL (ref 0–199)
CO2: 24 MMOL/L (ref 22–29)
CREAT SERPL-MCNC: 1.5 MG/DL (ref 0.5–1)
EOSINOPHILS ABSOLUTE: 0.34 E9/L (ref 0.05–0.5)
EOSINOPHILS RELATIVE PERCENT: 3.9 % (ref 0–6)
GFR AFRICAN AMERICAN: 40
GFR NON-AFRICAN AMERICAN: 33 ML/MIN/1.73
GLUCOSE BLD-MCNC: 165 MG/DL (ref 74–99)
HCT VFR BLD CALC: 43.3 % (ref 34–48)
HDLC SERPL-MCNC: 42 MG/DL
HEMOGLOBIN: 13.4 G/DL (ref 11.5–15.5)
IMMATURE GRANULOCYTES #: 0.02 E9/L
IMMATURE GRANULOCYTES %: 0.2 % (ref 0–5)
LDL CHOLESTEROL CALCULATED: 95 MG/DL (ref 0–99)
LYMPHOCYTES ABSOLUTE: 1.56 E9/L (ref 1.5–4)
LYMPHOCYTES RELATIVE PERCENT: 18.1 % (ref 20–42)
MCH RBC QN AUTO: 27.6 PG (ref 26–35)
MCHC RBC AUTO-ENTMCNC: 30.9 % (ref 32–34.5)
MCV RBC AUTO: 89.1 FL (ref 80–99.9)
MONOCYTES ABSOLUTE: 0.82 E9/L (ref 0.1–0.95)
MONOCYTES RELATIVE PERCENT: 9.5 % (ref 2–12)
NEUTROPHILS ABSOLUTE: 5.83 E9/L (ref 1.8–7.3)
NEUTROPHILS RELATIVE PERCENT: 67.5 % (ref 43–80)
PDW BLD-RTO: 13.9 FL (ref 11.5–15)
PLATELET # BLD: 198 E9/L (ref 130–450)
PMV BLD AUTO: 11 FL (ref 7–12)
POTASSIUM SERPL-SCNC: 5 MMOL/L (ref 3.5–5)
RBC # BLD: 4.86 E12/L (ref 3.5–5.5)
SODIUM BLD-SCNC: 144 MMOL/L (ref 132–146)
TOTAL PROTEIN: 7.8 G/DL (ref 6.4–8.3)
TRIGL SERPL-MCNC: 217 MG/DL (ref 0–149)
VLDLC SERPL CALC-MCNC: 43 MG/DL
WBC # BLD: 8.6 E9/L (ref 4.5–11.5)

## 2022-01-07 ENCOUNTER — OFFICE VISIT (OUTPATIENT)
Dept: FAMILY MEDICINE CLINIC | Age: 82
End: 2022-01-07
Payer: MEDICARE

## 2022-01-07 VITALS
DIASTOLIC BLOOD PRESSURE: 80 MMHG | BODY MASS INDEX: 34.85 KG/M2 | SYSTOLIC BLOOD PRESSURE: 136 MMHG | TEMPERATURE: 97 F | HEART RATE: 68 BPM | WEIGHT: 189 LBS | OXYGEN SATURATION: 97 %

## 2022-01-07 DIAGNOSIS — N28.9 RENAL INSUFFICIENCY: ICD-10-CM

## 2022-01-07 DIAGNOSIS — R73.9 HYPERGLYCEMIA: ICD-10-CM

## 2022-01-07 DIAGNOSIS — E78.5 HYPERLIPIDEMIA, UNSPECIFIED HYPERLIPIDEMIA TYPE: ICD-10-CM

## 2022-01-07 DIAGNOSIS — I10 ESSENTIAL HYPERTENSION: Primary | ICD-10-CM

## 2022-01-07 DIAGNOSIS — E03.9 ACQUIRED HYPOTHYROIDISM: ICD-10-CM

## 2022-01-07 PROCEDURE — 99214 OFFICE O/P EST MOD 30 MIN: CPT | Performed by: FAMILY MEDICINE

## 2022-01-07 RX ORDER — GLIPIZIDE 5 MG/1
5 TABLET ORAL DAILY
Qty: 60 TABLET | Refills: 3 | Status: SHIPPED
Start: 2022-01-07 | End: 2022-03-04 | Stop reason: SDUPTHER

## 2022-01-07 ASSESSMENT — ENCOUNTER SYMPTOMS
RESPIRATORY NEGATIVE: 1
EYES NEGATIVE: 1
ALLERGIC/IMMUNOLOGIC NEGATIVE: 1
GASTROINTESTINAL NEGATIVE: 1

## 2022-01-07 NOTE — PROGRESS NOTES
OFFICE PROGRESS NOTE      SUBJECTIVE:        Patient ID:   Latonya Ruelas is a 80 y.o. female who presents for   Chief Complaint   Patient presents with    Hypertension     Here for recheck on Hypertension,Hyperlipidemia and Hypothyroidism. Patient reports feeling well. Lab work done,here for review. HPI:   Patient states is feeling good  Complaining of arthritic pains  Lab work showed elevated creatinine of 1.5  Blood sugar 165  Triglyceride 217  Patient states is not following the diet  Has not been exercising  Did not drink enough fluids        Prior to Visit Medications    Medication Sig Taking?  Authorizing Provider   atenolol (TENORMIN) 25 MG tablet Take 1 tablet by mouth 2 times daily Yes Tameka Jara MD   levothyroxine (SYNTHROID) 50 MCG tablet Take 1 tablet by mouth Daily Yes Tameka Jara MD   rosuvastatin (CRESTOR) 5 MG tablet TAKE ONE TABLET BY MOUTH EVERY DAY Yes Tameka Jara MD   metFORMIN (GLUCOPHAGE) 1000 MG tablet Take 1 tablet by mouth 2 times daily (with meals) Yes Tameka Jara MD   amLODIPine (NORVASC) 5 MG tablet Take 1 tablet by mouth daily Yes Tameka Jara MD   diclofenac sodium (VOLTAREN) 1 % GEL Apply 4 g topically 4 times daily as needed (pain) Yes Mike Nguyen, DO   aspirin (ECOTRIN LOW STRENGTH) 81 MG EC tablet Take by mouth Yes Historical Provider, MD      Social History     Socioeconomic History    Marital status:      Spouse name: None    Number of children: None    Years of education: None    Highest education level: None   Occupational History    None   Tobacco Use    Smoking status: Never Smoker    Smokeless tobacco: Never Used   Substance and Sexual Activity    Alcohol use: No    Drug use: No    Sexual activity: None   Other Topics Concern    None   Social History Narrative    None     Social Determinants of Health     Financial Resource Strain: Low Risk     Difficulty of Paying Living Expenses: Not hard at all   Food Insecurity: No Food Insecurity    Worried About Running Out of Food in the Last Year: Never true    Ran Out of Food in the Last Year: Never true   Transportation Needs:     Lack of Transportation (Medical): Not on file    Lack of Transportation (Non-Medical): Not on file   Physical Activity:     Days of Exercise per Week: Not on file    Minutes of Exercise per Session: Not on file   Stress:     Feeling of Stress : Not on file   Social Connections:     Frequency of Communication with Friends and Family: Not on file    Frequency of Social Gatherings with Friends and Family: Not on file    Attends Synagogue Services: Not on file    Active Member of [address] [place] [address] [employer] [place] or Organizations: Not on file    Attends Club or Organization Meetings: Not on file    Marital Status: Not on file   Intimate Partner Violence:     Fear of Current or Ex-Partner: Not on file    Emotionally Abused: Not on file    Physically Abused: Not on file    Sexually Abused: Not on file   Housing Stability:     Unable to Pay for Housing in the Last Year: Not on file    Number of [place] in the Last Year: Not on file    Unstable Housing in the Last Year: Not on file       I have reviewed Bernadette's allergies, medications, problem list, medical, social and family history and have updated as needed in the electronic medical record  Review Of Systems:    Review of Systems   Constitutional: Negative. HENT: Negative. Eyes: Negative. Respiratory: Negative. Cardiovascular: Negative. Gastrointestinal: Negative. Endocrine: Negative. Musculoskeletal: Positive for arthralgias. Skin: Negative. Allergic/Immunologic: Negative. Neurological: Negative. Hematological: Negative. Psychiatric/Behavioral: Negative.                OBJECTIVE:     VS:  Wt Readings from Last 3 Encounters:   01/07/22 189 lb (85.7 kg)   10/22/21 189 lb (85.7 kg)   09/24/21 188 lb (85.3 kg)     Temp Readings from Last 3 Encounters:   01/07/22 97 °F (36.1 °C) (Infrared)   10/22/21 97 °F (36.1 °C) (Infrared)   09/24/21 96.8 °F (36 °C) (Infrared)     BP Readings from Last 3 Encounters:   01/07/22 136/80   10/22/21 136/80   09/24/21 130/75        Physical Exam  Constitutional:       Appearance: She is well-developed. HENT:      Head: Normocephalic and atraumatic. Eyes:      Conjunctiva/sclera: Conjunctivae normal.      Pupils: Pupils are equal, round, and reactive to light. Cardiovascular:      Rate and Rhythm: Normal rate and regular rhythm. Heart sounds: Normal heart sounds. Pulmonary:      Effort: Pulmonary effort is normal.      Breath sounds: Normal breath sounds. Abdominal:      General: Bowel sounds are normal.      Palpations: Abdomen is soft. Musculoskeletal:         General: Normal range of motion. Cervical back: Normal range of motion and neck supple. Skin:     General: Skin is warm and dry. Neurological:      Mental Status: She is alert and oriented to person, place, and time. Psychiatric:         Thought Content:  Thought content normal.            Labs :    Lab Results   Component Value Date    WBC 8.6 12/29/2021    HGB 13.4 12/29/2021    HCT 43.3 12/29/2021     12/29/2021    CHOL 180 12/29/2021    TRIG 217 (H) 12/29/2021    HDL 42 12/29/2021    ALT 31 12/29/2021    AST 32 (H) 12/29/2021     12/29/2021    K 5.0 12/29/2021     12/29/2021    CREATININE 1.5 (H) 12/29/2021    BUN 27 (H) 12/29/2021    CO2 24 12/29/2021    TSH 2.380 09/24/2021    LABA1C 7.7 (H) 09/24/2021     No results found for: VOL, APPEARANCE, COLORU, LABSPEC, LABPH, LEUKBLD, NITRU, GLUCOSEU, KETUA, UROBILINOGEN, KETUA, UROBILINOGEN, BILIRUBINUR, OCBU  No results found for: PSA, CEA, , [id number],       Controlled Substances Monitoring:                                    ASSESSMENT     Patient Active Problem List    Diagnosis Date Noted    Lumbar spondylosis 07/03/2018    Spinal stenosis of lumbar region 07/03/2018    Essential hypertension 09/18/2017    Gout of multiple sites 09/18/2017    Acquired hypothyroidism 09/18/2017    Trochanteric bursitis, right hip 09/18/2017    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 Essential hypertension  I10    2. Acquired hypothyroidism  E03.9    3. Hyperlipidemia, unspecified hyperlipidemia type  E78.5 CBC WITH AUTO DIFFERENTIAL     COMPREHENSIVE METABOLIC PANEL     LIPID PANEL   4. Hyperglycemia  R73.9 COMPREHENSIVE METABOLIC PANEL   5. Renal insufficiency  N28.9 CBC WITH AUTO DIFFERENTIAL       PLAN:   Decrease the dose of Glucophage to 500 mg twice a day  Glucotrol 5 mg daily  Low-sodium low-fat diabetic diet  Blood sugar monitoring  Force fluids  Return to clinic earlier if any problems        Patient Instructions   Stop taking Glucophage 1000 mg twice a day  Start on Glucophage 500 mg twice a day  Start on Glucotrol 5 mg daily  Blood sugar monitoring  Low-sodium low-fat diet diabetic diet  Lab work before the next visit  Force fluids  Return to clinic earlier if any problems      Return in about 4 weeks (around 2/4/2022) for diabetes check, test results, Medication Check. Hina Rojo reviewed my findings and recommendations with Toyin Bernard.     Electronicallysigned by Anh Whiteside MD on 1/7/22 at 11:57 AM EST

## 2022-01-07 NOTE — PATIENT INSTRUCTIONS
Stop taking Glucophage 1000 mg twice a day  Start on Glucophage 500 mg twice a day  Start on Glucotrol 5 mg daily  Blood sugar monitoring  Low-sodium low-fat diet diabetic diet  Lab work before the next visit  Force fluids  Return to clinic earlier if any problems

## 2022-02-07 DIAGNOSIS — R73.9 HYPERGLYCEMIA: ICD-10-CM

## 2022-02-07 DIAGNOSIS — N28.9 RENAL INSUFFICIENCY: ICD-10-CM

## 2022-02-07 DIAGNOSIS — E78.5 HYPERLIPIDEMIA, UNSPECIFIED HYPERLIPIDEMIA TYPE: ICD-10-CM

## 2022-02-07 LAB
ALBUMIN SERPL-MCNC: 4.3 G/DL (ref 3.5–5.2)
ALP BLD-CCNC: 53 U/L (ref 35–104)
ALT SERPL-CCNC: 37 U/L (ref 0–32)
ANION GAP SERPL CALCULATED.3IONS-SCNC: 17 MMOL/L (ref 7–16)
AST SERPL-CCNC: 47 U/L (ref 0–31)
BASOPHILS ABSOLUTE: 0.05 E9/L (ref 0–0.2)
BASOPHILS RELATIVE PERCENT: 0.5 % (ref 0–2)
BILIRUB SERPL-MCNC: 0.4 MG/DL (ref 0–1.2)
BUN BLDV-MCNC: 26 MG/DL (ref 6–23)
CALCIUM SERPL-MCNC: 10.2 MG/DL (ref 8.6–10.2)
CHLORIDE BLD-SCNC: 103 MMOL/L (ref 98–107)
CHOLESTEROL, TOTAL: 188 MG/DL (ref 0–199)
CO2: 22 MMOL/L (ref 22–29)
CREAT SERPL-MCNC: 1.4 MG/DL (ref 0.5–1)
EOSINOPHILS ABSOLUTE: 0.25 E9/L (ref 0.05–0.5)
EOSINOPHILS RELATIVE PERCENT: 2.7 % (ref 0–6)
GFR AFRICAN AMERICAN: 44
GFR NON-AFRICAN AMERICAN: 36 ML/MIN/1.73
GLUCOSE BLD-MCNC: 158 MG/DL (ref 74–99)
HCT VFR BLD CALC: 44.1 % (ref 34–48)
HDLC SERPL-MCNC: 38 MG/DL
HEMOGLOBIN: 13.6 G/DL (ref 11.5–15.5)
IMMATURE GRANULOCYTES #: 0.04 E9/L
IMMATURE GRANULOCYTES %: 0.4 % (ref 0–5)
LDL CHOLESTEROL CALCULATED: 107 MG/DL (ref 0–99)
LYMPHOCYTES ABSOLUTE: 1.46 E9/L (ref 1.5–4)
LYMPHOCYTES RELATIVE PERCENT: 15.5 % (ref 20–42)
MCH RBC QN AUTO: 27.2 PG (ref 26–35)
MCHC RBC AUTO-ENTMCNC: 30.8 % (ref 32–34.5)
MCV RBC AUTO: 88.2 FL (ref 80–99.9)
MONOCYTES ABSOLUTE: 0.76 E9/L (ref 0.1–0.95)
MONOCYTES RELATIVE PERCENT: 8.1 % (ref 2–12)
NEUTROPHILS ABSOLUTE: 6.83 E9/L (ref 1.8–7.3)
NEUTROPHILS RELATIVE PERCENT: 72.8 % (ref 43–80)
PDW BLD-RTO: 14.2 FL (ref 11.5–15)
PLATELET # BLD: 209 E9/L (ref 130–450)
PMV BLD AUTO: 10.9 FL (ref 7–12)
POTASSIUM SERPL-SCNC: 5.3 MMOL/L (ref 3.5–5)
RBC # BLD: 5 E12/L (ref 3.5–5.5)
SODIUM BLD-SCNC: 142 MMOL/L (ref 132–146)
TOTAL PROTEIN: 7.6 G/DL (ref 6.4–8.3)
TRIGL SERPL-MCNC: 214 MG/DL (ref 0–149)
VLDLC SERPL CALC-MCNC: 43 MG/DL
WBC # BLD: 9.4 E9/L (ref 4.5–11.5)

## 2022-02-11 ENCOUNTER — OFFICE VISIT (OUTPATIENT)
Dept: FAMILY MEDICINE CLINIC | Age: 82
End: 2022-02-11
Payer: MEDICARE

## 2022-02-11 VITALS
SYSTOLIC BLOOD PRESSURE: 136 MMHG | OXYGEN SATURATION: 96 % | DIASTOLIC BLOOD PRESSURE: 78 MMHG | TEMPERATURE: 96.6 F | HEART RATE: 71 BPM | WEIGHT: 192 LBS | BODY MASS INDEX: 35.4 KG/M2

## 2022-02-11 DIAGNOSIS — E03.9 ACQUIRED HYPOTHYROIDISM: ICD-10-CM

## 2022-02-11 DIAGNOSIS — E66.01 SEVERE OBESITY (BMI 35.0-39.9) WITH COMORBIDITY (HCC): ICD-10-CM

## 2022-02-11 DIAGNOSIS — E78.5 HYPERLIPIDEMIA, UNSPECIFIED HYPERLIPIDEMIA TYPE: ICD-10-CM

## 2022-02-11 DIAGNOSIS — R73.9 HYPERGLYCEMIA: ICD-10-CM

## 2022-02-11 DIAGNOSIS — N28.9 RENAL INSUFFICIENCY: ICD-10-CM

## 2022-02-11 DIAGNOSIS — I10 ESSENTIAL HYPERTENSION: Primary | ICD-10-CM

## 2022-02-11 DIAGNOSIS — E11.9 TYPE 2 DIABETES MELLITUS WITHOUT COMPLICATION, WITHOUT LONG-TERM CURRENT USE OF INSULIN (HCC): ICD-10-CM

## 2022-02-11 PROCEDURE — 99214 OFFICE O/P EST MOD 30 MIN: CPT | Performed by: FAMILY MEDICINE

## 2022-02-11 ASSESSMENT — ENCOUNTER SYMPTOMS
GASTROINTESTINAL NEGATIVE: 1
ALLERGIC/IMMUNOLOGIC NEGATIVE: 1
RESPIRATORY NEGATIVE: 1
EYES NEGATIVE: 1

## 2022-02-11 NOTE — PROGRESS NOTES
OFFICE PROGRESS NOTE      SUBJECTIVE:        Patient ID:   Shankar Rutherford is a 80 y.o. female who presents for   Chief Complaint   Patient presents with    Hypertension     Here for recheck on Hypertension,Hypothyroidism and DM. Lab work done,here for review. HPI:   Patient here for a follow-up   present with the patient   said patient is getting forgetful  Lab work reviewed  Creatinine 1.4  Potassium 5.3  Lipids still elevated  Fasting sugar 158  Patient not following the diet  Patient not exercising        Prior to Visit Medications    Medication Sig Taking?  Authorizing Provider   metFORMIN (GLUCOPHAGE) 500 MG tablet Take 1 tablet by mouth 2 times daily (with meals) Yes Valencia Ryan MD   glipiZIDE (GLUCOTROL) 5 MG tablet Take 1 tablet by mouth daily Yes Valencia Ryan MD   atenolol (TENORMIN) 25 MG tablet Take 1 tablet by mouth 2 times daily Yes Valencia Ryan MD   levothyroxine (SYNTHROID) 50 MCG tablet Take 1 tablet by mouth Daily Yes Valencia Ryan MD   rosuvastatin (CRESTOR) 5 MG tablet TAKE ONE TABLET BY MOUTH EVERY DAY Yes Valencia Ryan MD   amLODIPine (NORVASC) 5 MG tablet Take 1 tablet by mouth daily Yes Valencia Ryan MD   diclofenac sodium (VOLTAREN) 1 % GEL Apply 4 g topically 4 times daily as needed (pain) Yes Alicia Buenrostro DO   aspirin (ECOTRIN LOW STRENGTH) 81 MG EC tablet Take by mouth Yes Historical Provider, MD      Social History     Socioeconomic History    Marital status:      Spouse name: None    Number of children: None    Years of education: None    Highest education level: None   Occupational History    None   Tobacco Use    Smoking status: Never Smoker    Smokeless tobacco: Never Used   Substance and Sexual Activity    Alcohol use: No    Drug use: No    Sexual activity: None   Other Topics Concern    None   Social History Narrative    None     Social Determinants of Health Financial Resource Strain: Low Risk     Difficulty of Paying Living Expenses: Not hard at all   Food Insecurity: No Food Insecurity    Worried About Running Out of Food in the Last Year: Never true    Rashawn of Food in the Last Year: Never true   Transportation Needs:     Lack of Transportation (Medical): Not on file    Lack of Transportation (Non-Medical): Not on file   Physical Activity:     Days of Exercise per Week: Not on file    Minutes of Exercise per Session: Not on file   Stress:     Feeling of Stress : Not on file   Social Connections:     Frequency of Communication with Friends and Family: Not on file    Frequency of Social Gatherings with Friends and Family: Not on file    Attends Taoist Services: Not on file    Active Member of [address] [place] [address] [employer] or Organizations: Not on file    Attends Club or Organization Meetings: Not on file    Marital Status: Not on file   Intimate Partner Violence:     Fear of Current or Ex-Partner: Not on file    Emotionally Abused: Not on file    Physically Abused: Not on file    Sexually Abused: Not on file   Housing Stability:     Unable to Pay for Housing in the Last Year: Not on file    Number of [place] in the Last Year: Not on file    Unstable Housing in the Last Year: Not on file       I have reviewed Bernadette's allergies, medications, problem list, medical, social and family history and have updated as needed in the electronic medical record  Review Of Systems:    Review of Systems   Constitutional: Negative. HENT: Negative. Eyes: Negative. Respiratory: Negative. Cardiovascular: Negative. Gastrointestinal: Negative. Endocrine: Negative. Musculoskeletal: Negative. Skin: Negative. Allergic/Immunologic: Negative. Neurological: Negative. Hematological: Negative. Psychiatric/Behavioral: Negative.                OBJECTIVE:     VS:  Wt Readings from Last 3 Encounters:   02/11/22 192 lb (87.1 kg)   01/07/22 189 lb (85.7 kg) 10/22/21 189 lb (85.7 kg)     Temp Readings from Last 3 Encounters:   02/11/22 96.6 °F (35.9 °C) (Infrared)   01/07/22 97 °F (36.1 °C) (Infrared)   10/22/21 97 °F (36.1 °C) (Infrared)     BP Readings from Last 3 Encounters:   02/11/22 136/78   01/07/22 136/80   10/22/21 136/80        Physical Exam  Constitutional:       Appearance: She is well-developed. HENT:      Head: Normocephalic and atraumatic. Eyes:      Conjunctiva/sclera: Conjunctivae normal.      Pupils: Pupils are equal, round, and reactive to light. Cardiovascular:      Rate and Rhythm: Normal rate and regular rhythm. Heart sounds: Normal heart sounds. Pulmonary:      Effort: Pulmonary effort is normal.      Breath sounds: Normal breath sounds. Abdominal:      General: Bowel sounds are normal.      Palpations: Abdomen is soft. Musculoskeletal:         General: Normal range of motion. Cervical back: Normal range of motion and neck supple. Skin:     General: Skin is warm and dry. Neurological:      Mental Status: She is alert and oriented to person, place, and time. Psychiatric:         Thought Content:  Thought content normal.            Labs :    Lab Results   Component Value Date    WBC 9.4 02/07/2022    HGB 13.6 02/07/2022    HCT 44.1 02/07/2022     02/07/2022    CHOL 188 02/07/2022    TRIG 214 (H) 02/07/2022    HDL 38 02/07/2022    ALT 37 (H) 02/07/2022    AST 47 (H) 02/07/2022     02/07/2022    K 5.3 (H) 02/07/2022     02/07/2022    CREATININE 1.4 (H) 02/07/2022    BUN 26 (H) 02/07/2022    CO2 22 02/07/2022    TSH 2.380 09/24/2021    LABA1C 7.7 (H) 09/24/2021     No results found for: VOL, APPEARANCE, COLORU, LABSPEC, LABPH, LEUKBLD, NITRU, GLUCOSEU, KETUA, UROBILINOGEN, KETUA, UROBILINOGEN, BILIRUBINUR, OCBU  No results found for: PSA, CEA, , [id number],       Controlled Substances Monitoring:                                    ASSESSMENT     Patient Active Problem List    Diagnosis Date Noted  Type 2 diabetes mellitus 02/11/2022    Lumbar spondylosis 07/03/2018    Spinal stenosis of lumbar region 07/03/2018    Essential hypertension 09/18/2017    Gout of multiple sites 09/18/2017    Acquired hypothyroidism 09/18/2017    Trochanteric bursitis, right hip 09/18/2017    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 Essential hypertension  I10 CBC WITH AUTO DIFFERENTIAL     COMPREHENSIVE METABOLIC PANEL   2. Type 2 diabetes mellitus without complication, without long-term current use of insulin (HCC)  E11.9 COMPREHENSIVE METABOLIC PANEL   3. Acquired hypothyroidism  E03.9    4. Hyperlipidemia, unspecified hyperlipidemia type  E78.5 COMPREHENSIVE METABOLIC PANEL     LIPID PANEL   5. Hyperglycemia  R73.9 COMPREHENSIVE METABOLIC PANEL   6. Renal insufficiency  N28.9 CBC WITH AUTO DIFFERENTIAL     COMPREHENSIVE METABOLIC PANEL   7. Severe obesity (BMI 35.0-39. 9) with comorbidity ([address]  E66.01        PLAN:   Continue present treatment  Force fluids  Nephrology referral patient wants to wait  Low-sodium low-fat diabetic weight reduction diet  Regular exercises  Return to clinic earlier if any problems        Patient Instructions   Continue present treatment  Low-sodium low-fat diabetic weight reduction diet  Regular exercises  Return to clinic earlier if any problem  If the renal function gets any worse will refer you to the nephrologist  Bring in all the medication at the next visit        Return in about 2 weeks (around 2/25/2022) for annaul visit. Jeremías Doran reviewed my findings and recommendations with Rosalie Morley.     Electronicallysigned by August Miller MD on 2/11/22 at 10:38 AM EST

## 2022-03-04 ENCOUNTER — OFFICE VISIT (OUTPATIENT)
Dept: FAMILY MEDICINE CLINIC | Age: 82
End: 2022-03-04
Payer: MEDICARE

## 2022-03-04 VITALS
SYSTOLIC BLOOD PRESSURE: 120 MMHG | BODY MASS INDEX: 34.96 KG/M2 | WEIGHT: 190 LBS | DIASTOLIC BLOOD PRESSURE: 72 MMHG | HEART RATE: 72 BPM | HEIGHT: 62 IN | TEMPERATURE: 96.5 F | OXYGEN SATURATION: 99 %

## 2022-03-04 DIAGNOSIS — Z00.00 INITIAL MEDICARE ANNUAL WELLNESS VISIT: ICD-10-CM

## 2022-03-04 DIAGNOSIS — E78.49 OTHER HYPERLIPIDEMIA: ICD-10-CM

## 2022-03-04 DIAGNOSIS — G30.9 ALZHEIMER'S DISEASE, UNSPECIFIED (CODE) (HCC): ICD-10-CM

## 2022-03-04 DIAGNOSIS — E03.8 OTHER SPECIFIED HYPOTHYROIDISM: ICD-10-CM

## 2022-03-04 DIAGNOSIS — R73.9 HYPERGLYCEMIA: ICD-10-CM

## 2022-03-04 DIAGNOSIS — E03.9 ACQUIRED HYPOTHYROIDISM: ICD-10-CM

## 2022-03-04 DIAGNOSIS — I10 ESSENTIAL HYPERTENSION: Primary | ICD-10-CM

## 2022-03-04 PROCEDURE — G0438 PPPS, INITIAL VISIT: HCPCS | Performed by: FAMILY MEDICINE

## 2022-03-04 RX ORDER — ROSUVASTATIN CALCIUM 5 MG/1
TABLET, COATED ORAL
Qty: 90 TABLET | Refills: 1 | Status: SHIPPED | OUTPATIENT
Start: 2022-03-04

## 2022-03-04 RX ORDER — DONEPEZIL HYDROCHLORIDE 5 MG/1
5 TABLET, FILM COATED ORAL NIGHTLY
Qty: 30 TABLET | Refills: 3 | Status: SHIPPED
Start: 2022-03-04 | End: 2022-04-01 | Stop reason: SDUPTHER

## 2022-03-04 RX ORDER — GLUCOSAMINE HCL/CHONDROITIN SU 500-400 MG
1 CAPSULE ORAL
COMMUNITY
End: 2022-04-01 | Stop reason: SDUPTHER

## 2022-03-04 RX ORDER — AMLODIPINE BESYLATE 5 MG/1
5 TABLET ORAL DAILY
Qty: 90 TABLET | Refills: 1 | Status: SHIPPED
Start: 2022-03-04 | End: 2022-08-05 | Stop reason: SDUPTHER

## 2022-03-04 RX ORDER — LEVOTHYROXINE SODIUM 0.05 MG/1
50 TABLET ORAL DAILY
Qty: 90 TABLET | Refills: 1 | Status: SHIPPED
Start: 2022-03-04 | End: 2022-08-05

## 2022-03-04 RX ORDER — GLIPIZIDE 5 MG/1
5 TABLET ORAL DAILY
Qty: 180 TABLET | Refills: 1 | Status: SHIPPED | OUTPATIENT
Start: 2022-03-04

## 2022-03-04 RX ORDER — ATENOLOL 25 MG/1
25 TABLET ORAL 2 TIMES DAILY
Qty: 180 TABLET | Refills: 1 | Status: SHIPPED
Start: 2022-03-04 | End: 2022-10-26 | Stop reason: SDUPTHER

## 2022-03-04 ASSESSMENT — PATIENT HEALTH QUESTIONNAIRE - PHQ9
SUM OF ALL RESPONSES TO PHQ QUESTIONS 1-9: 0
2. FEELING DOWN, DEPRESSED OR HOPELESS: 0
1. LITTLE INTEREST OR PLEASURE IN DOING THINGS: 0
SUM OF ALL RESPONSES TO PHQ QUESTIONS 1-9: 0
SUM OF ALL RESPONSES TO PHQ9 QUESTIONS 1 & 2: 0

## 2022-03-04 ASSESSMENT — LIFESTYLE VARIABLES: HOW OFTEN DO YOU HAVE A DRINK CONTAINING ALCOHOL: NEVER

## 2022-03-04 NOTE — PATIENT INSTRUCTIONS
Personalized Preventive Plan for [facility] - 3/4/2022  Medicare offers a range of preventive health benefits. Some of the tests and screenings are paid in full while other may be subject to a deductible, co-insurance, and/or copay. Some of these benefits include a comprehensive review of your medical history including lifestyle, illnesses that may run in your family, and various assessments and screenings as appropriate. After reviewing your medical record and screening and assessments performed today your provider may have ordered immunizations, labs, imaging, and/or referrals for you. A list of these orders (if applicable) as well as your Preventive Care list are included within your After Visit Summary for your review. Other Preventive Recommendations:    · A preventive eye exam performed by an eye specialist is recommended every 1-2 years to screen for glaucoma; cataracts, macular degeneration, and other eye disorders. · A preventive dental visit is recommended every 6 months. · Try to get at least 150 minutes of exercise per week or 10,000 steps per day on a pedometer . · Order or download the FREE \"Exercise & Physical Activity: Your Everyday Guide\" from [employer]. Call 3-710.138.5103 or search [employer] online. · You need 7565-0545 mg of calcium and 2437-1923 IU of vitamin D per day. It is possible to meet your calcium requirement with diet alone, but a vitamin D supplement is usually necessary to meet this goal.  · When exposed to the sun, use a sunscreen that protects against both UVA and UVB radiation with an SPF of 30 or greater. Reapply every 2 to 3 hours or after sweating, drying off with a towel, or swimming. · Always wear a seat belt when traveling in a car. Always wear a helmet when riding a bicycle or motorcycle.

## 2022-03-04 NOTE — PROGRESS NOTES
[facility]  Medicare Annual Wellness Visit    Nerissa Levine is here for Medicare AWV (Here for AWV)    Yessenia Cramer was seen today for medicare awv. Diagnoses and all orders for this visit:    Essential hypertension  -     atenolol (TENORMIN) 25 MG tablet; Take 1 tablet by mouth 2 times daily  -     amLODIPine (NORVASC) 5 MG tablet; Take 1 tablet by mouth daily    Initial Medicare annual wellness visit    Hyperglycemia  -     metFORMIN (GLUCOPHAGE) 500 MG tablet; Take 1 tablet by mouth 2 times daily (with meals)  -     glipiZIDE (GLUCOTROL) 5 MG tablet; Take 1 tablet by mouth daily    Acquired hypothyroidism  -     levothyroxine (SYNTHROID) 50 MCG tablet; Take 1 tablet by mouth Daily    Other specified hypothyroidism    Other hyperlipidemia  -     rosuvastatin (CRESTOR) 5 MG tablet; TAKE ONE TABLET BY MOUTH EVERY DAY    Alzheimer's disease, unspecified    Other orders  -     donepezil (ARICEPT) 5 MG tablet; Take 1 tablet by mouth nightly         Recommendations for Preventive Services Due: see orders and patient instructions/AVS.  Recommended screening schedule for the next 5-10 years is provided to the patient in written form: see Patient Instructions/AVS.     Return in 4 weeks (on 4/1/2022) for Medicare Annual Wellness Visit in 1 year, Medication Check. Subjective   The following acute and/or chronic problems were also addressed today:      Patient's complete Health Risk Assessment and screening values have been reviewed and are found in Flowsheets. The following problems were reviewed today and where indicated follow up appointments were made and/or referrals ordered. Positive Risk Factor Screenings with Interventions:     Cognitive:   Words recalled: 0 Words Recalled  Clock Drawing Test (CDT): Normal  Total Score Interpretation: Abnormal Mini-Cog  Did the patient refuse to take the cognition test?: No    Cognitive Impairment Interventions:  · Patient declines any further evaluation/treatment for cognitive impairment             General Health and ACP:  General  In general, how would you say your health is?: Good  In the past 7 days, have you experienced any of the following: New or Increased Pain, New or Increased Fatigue, Loneliness, Social Isolation, Stress or Anger?: No  Do you get the social and emotional support that you need?: Yes  Do you have a Living Will?: Yes    Advance Directives     Power of  Living Will ACP-Advance Directive ACP-Power of     Not on File Not on File Not on File Not on File      General Health Risk Interventions:  · No definite issues    Health Habits/Nutrition:     Physical Activity: Inactive    Days of Exercise per Week: 0 days    Minutes of Exercise per Session: 0 min     Have you lost any weight without trying in the past 3 months?: No  Body mass index: (!) 34.75  Have you seen the dentist within the past year?: Yes    Health Habits/Nutrition Interventions:  · Nutritional issues:  educational materials for healthy, well-balanced diet provided             Objective   Vitals:    03/04/22 1022 03/04/22 1035   BP: (!) 150/100 120/72   Pulse: 72    Temp: 96.5 °F (35.8 °C)    TempSrc: Infrared    SpO2: 99%    Weight: 190 lb (86.2 kg)    Height: 5' 2\" (1.575 m)       Body mass index is 34.75 kg/m². No Known Allergies  Prior to Visit Medications    Medication Sig Taking? Authorizing Provider   blood glucose monitor strips 1 strip by Other route Test 2 times a day & as needed for symptoms of irregular blood glucose. Dispense sufficient amount for indicated testing frequency plus additional to accommodate PRN testing needs.  Yes Historical Provider, MD   atenolol (TENORMIN) 25 MG tablet Take 1 tablet by mouth 2 times daily Yes William Hernandez MD   levothyroxine (SYNTHROID) 50 MCG tablet Take 1 tablet by mouth Daily Yes William Hernandez MD   metFORMIN (GLUCOPHAGE) 500 MG tablet Take 1 tablet by mouth 2 times daily (with meals) Yes Alyssia Rose MD   rosuvastatin (CRESTOR) 5 MG tablet TAKE ONE TABLET BY MOUTH EVERY DAY Yes Alyssia Rose MD   glipiZIDE (GLUCOTROL) 5 MG tablet Take 1 tablet by mouth daily Yes Alyssia Rose MD   amLODIPine (NORVASC) 5 MG tablet Take 1 tablet by mouth daily Yes Alyssia Rose MD   donepezil (ARICEPT) 5 MG tablet Take 1 tablet by mouth nightly Yes Alyssia Rose MD   diclofenac sodium (VOLTAREN) 1 % GEL Apply 4 g topically 4 times daily as needed (pain) Yes Elsinore Bless, DO   aspirin (ECOTRIN LOW STRENGTH) 81 MG EC tablet Take by mouth Yes Historical Provider, MD Chen (Including outside providers/suppliers regularly involved in providing care):   Patient Care Team:  Alyssia Rose MD as PCP - General (Family Medicine)  Alyssia Rose MD as PCP - [facility] Empaneled Provider    Reviewed and updated this visit:  Tobacco  Allergies  Meds  Problems  Med Hx  Surg Hx  Soc Hx  Fam Hx

## 2022-04-01 ENCOUNTER — OFFICE VISIT (OUTPATIENT)
Dept: FAMILY MEDICINE CLINIC | Age: 82
End: 2022-04-01
Payer: MEDICARE

## 2022-04-01 VITALS
BODY MASS INDEX: 35.48 KG/M2 | HEART RATE: 74 BPM | DIASTOLIC BLOOD PRESSURE: 100 MMHG | TEMPERATURE: 96.5 F | WEIGHT: 194 LBS | SYSTOLIC BLOOD PRESSURE: 186 MMHG | OXYGEN SATURATION: 97 %

## 2022-04-01 DIAGNOSIS — N28.9 RENAL INSUFFICIENCY: ICD-10-CM

## 2022-04-01 DIAGNOSIS — R73.9 HYPERGLYCEMIA: Primary | ICD-10-CM

## 2022-04-01 DIAGNOSIS — I10 ESSENTIAL HYPERTENSION: ICD-10-CM

## 2022-04-01 DIAGNOSIS — E66.01 SEVERE OBESITY (BMI 35.0-39.9) WITH COMORBIDITY (HCC): ICD-10-CM

## 2022-04-01 DIAGNOSIS — G30.9 ALZHEIMER'S DISEASE, UNSPECIFIED (CODE) (HCC): ICD-10-CM

## 2022-04-01 DIAGNOSIS — E78.5 HYPERLIPIDEMIA, UNSPECIFIED HYPERLIPIDEMIA TYPE: ICD-10-CM

## 2022-04-01 DIAGNOSIS — E11.9 TYPE 2 DIABETES MELLITUS WITHOUT COMPLICATION, WITHOUT LONG-TERM CURRENT USE OF INSULIN (HCC): ICD-10-CM

## 2022-04-01 DIAGNOSIS — E03.9 ACQUIRED HYPOTHYROIDISM: ICD-10-CM

## 2022-04-01 PROCEDURE — 99214 OFFICE O/P EST MOD 30 MIN: CPT | Performed by: FAMILY MEDICINE

## 2022-04-01 RX ORDER — LANCETS
1 EACH MISCELLANEOUS DAILY
Qty: 100 EACH | Refills: 3 | Status: SHIPPED | OUTPATIENT
Start: 2022-04-01

## 2022-04-01 RX ORDER — DONEPEZIL HYDROCHLORIDE 5 MG/1
5 TABLET, FILM COATED ORAL NIGHTLY
Qty: 90 TABLET | Refills: 1 | Status: SHIPPED | OUTPATIENT
Start: 2022-04-01

## 2022-04-01 RX ORDER — GLUCOSAMINE HCL/CHONDROITIN SU 500-400 MG
1 CAPSULE ORAL DAILY
Qty: 100 STRIP | Refills: 3 | Status: SHIPPED | OUTPATIENT
Start: 2022-04-01

## 2022-04-01 RX ORDER — LANCETS
1 EACH MISCELLANEOUS DAILY
COMMUNITY
End: 2022-04-01 | Stop reason: SDUPTHER

## 2022-04-01 ASSESSMENT — ENCOUNTER SYMPTOMS
GASTROINTESTINAL NEGATIVE: 1
ALLERGIC/IMMUNOLOGIC NEGATIVE: 1
EYES NEGATIVE: 1
RESPIRATORY NEGATIVE: 1

## 2022-04-01 NOTE — PATIENT INSTRUCTIONS
Continue present treatment  Low-sodium low-fat diabetic weight reduction diet  Regular exercises  Take the medication as prescribed  Get the blood test done before the next visit  Return to clinic earlier if any problems

## 2022-04-01 NOTE — PROGRESS NOTES
OFFICE PROGRESS NOTE      SUBJECTIVE:        Patient ID:   Anna Lama is a 80 y.o. female who presents for   Chief Complaint   Patient presents with    Hyperlipidemia     Here for recheck on Hypertension,Hyperlipidemia,DM and Hypothyroidism. Did not take her medication for her hypertension today. HPI:   Patient states is feeling okay  Did not take her medications  Blood pressure borderline high  Patient is not following the diet and exercise  Confusion is to be better        Prior to Visit Medications    Medication Sig Taking? Authorizing Provider   BLOOD GLUCOSE MONITOR 1 each Use once daily Yes Historical Provider, MD   donepezil (ARICEPT) 5 MG tablet Take 1 tablet by mouth nightly Yes Beth Phipps MD   blood glucose monitor strips 1 strip by Other route daily Test 2 times a day & as needed for symptoms of irregular blood glucose. Dispense sufficient amount for indicated testing frequency plus additional to accommodate PRN testing needs. E11.9 Yes Beth Phipps MD   Lancets Thin MISC 1 each by Does not apply route daily E11.9 Yes Beth Phipps MD   blood glucose monitor kit and supplies Dispense sufficient amount for indicated testing frequency plus additional to accommodate PRN testing needs. Dispense all needed supplies to include: monitor, strips, lancing device, lancets, control solutions, alcohol swabs.  Yes Beth Phipps MD   diclofenac sodium (VOLTAREN) 1 % GEL Apply 4 g topically 4 times daily Yes Beth Phipps MD   atenolol (TENORMIN) 25 MG tablet Take 1 tablet by mouth 2 times daily Yes Beth Phipps MD   levothyroxine (SYNTHROID) 50 MCG tablet Take 1 tablet by mouth Daily Yes Beth Phipps MD   metFORMIN (GLUCOPHAGE) 500 MG tablet Take 1 tablet by mouth 2 times daily (with meals) Yes Beth Phipps MD   rosuvastatin (CRESTOR) 5 MG tablet TAKE ONE TABLET BY MOUTH EVERY DAY Yes Beth Phipps MD   glipiZIDE ([address]) Stability:     Unable to Pay for Housing in the Last Year: Not on file    Number of Places Lived in the Last Year: Not on file    Unstable Housing in the Last Year: Not on file       I have reviewed Bernadette's allergies, medications, problem list, medical, social and family history and have updated as needed in the electronic medical record  Review Of Systems:    Review of Systems   Constitutional: Negative. HENT: Negative. Eyes: Negative. Respiratory: Negative. Cardiovascular: Negative. Gastrointestinal: Negative. Endocrine: Negative. Musculoskeletal: Negative. Skin: Negative. Allergic/Immunologic: Negative. Neurological: Negative. Hematological: Negative. Psychiatric/Behavioral: Positive for confusion. OBJECTIVE:     VS:  Wt Readings from Last 3 Encounters:   04/01/22 194 lb (88 kg)   03/04/22 190 lb (86.2 kg)   02/11/22 192 lb (87.1 kg)     Temp Readings from Last 3 Encounters:   04/01/22 96.5 °F (35.8 °C) (Infrared)   03/04/22 96.5 °F (35.8 °C) (Infrared)   02/11/22 96.6 °F (35.9 °C) (Infrared)     BP Readings from Last 3 Encounters:   04/01/22 (!) 186/100   03/04/22 120/72   02/11/22 136/78        Physical Exam  Constitutional:       Appearance: She is well-developed. HENT:      Head: Normocephalic and atraumatic. Eyes:      Conjunctiva/sclera: Conjunctivae normal.      Pupils: Pupils are equal, round, and reactive to light. Cardiovascular:      Rate and Rhythm: Normal rate and regular rhythm. Heart sounds: Normal heart sounds. Pulmonary:      Effort: Pulmonary effort is normal.      Breath sounds: Normal breath sounds. Abdominal:      General: Bowel sounds are normal.      Palpations: Abdomen is soft. Musculoskeletal:         General: Normal range of motion. Cervical back: Normal range of motion and neck supple. Skin:     General: Skin is warm and dry.    Neurological:      Mental Status: She is alert and oriented to person, place, and time.   Psychiatric:         Thought Content: Thought content normal.            Labs :    Lab Results   Component Value Date    WBC 9.4 02/07/2022    HGB 13.6 02/07/2022    HCT 44.1 02/07/2022     02/07/2022    CHOL 188 02/07/2022    TRIG 214 (H) 02/07/2022    HDL 38 02/07/2022    ALT 37 (H) 02/07/2022    AST 47 (H) 02/07/2022     02/07/2022    K 5.3 (H) 02/07/2022     02/07/2022    CREATININE 1.4 (H) 02/07/2022    BUN 26 (H) 02/07/2022    CO2 22 02/07/2022    TSH 2.380 09/24/2021    LABA1C 7.7 (H) 09/24/2021     No results found for: VOL, APPEARANCE, COLORU, LABSPEC, LABPH, LEUKBLD, NITRU, GLUCOSEU, KETUA, UROBILINOGEN, KETUA, [address], BILIRUBINUR, OCBU  No results found for: PSA, CEA, , [id number],       Controlled Substances Monitoring:                                    ASSESSMENT     Patient Active Problem List    Diagnosis Date Noted    Alzheimer's disease, unspecified 03/04/2022    Type 2 diabetes mellitus 02/11/2022    Lumbar spondylosis 07/03/2018    Spinal stenosis of lumbar region 07/03/2018    Essential hypertension 09/18/2017    Gout of multiple sites 09/18/2017    Acquired hypothyroidism 09/18/2017    Trochanteric bursitis, right hip 09/18/2017    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 Hyperglycemia  R73.9 blood glucose monitor strips   2. Alzheimer's disease, unspecified (CODE) ([facility]  G30.9 donepezil (ARICEPT) 5 MG tablet   3. Type 2 diabetes mellitus without complication, without long-term current use of insulin (HCC)  E11.9 blood glucose monitor strips     Lancets Thin MISC     blood glucose monitor kit and supplies   4. Essential hypertension  I10    5. Acquired hypothyroidism  E03.9    6. Renal insufficiency  N28.9 CBC with Auto Differential   7. Hyperlipidemia, unspecified hyperlipidemia type  E78.5 Comprehensive Metabolic Panel     LIPID PANEL   8. Severe obesity (BMI 35.0-39. 9) with comorbidity ([facility]  E66.01 CBC with Auto Differential       PLAN:   Continue present treatment  Low-sodium low-fat diabetic weight reduction diet  Regular exercises  Get the lab work done before the next visit  Instructed the patient to take the medication as prescribed  Instruction given to the patient and           Patient Instructions   Continue present treatment  Low-sodium low-fat diabetic weight reduction diet  Regular exercises  Take the medication as prescribed  Get the blood test done before the next visit  Return to clinic earlier if any problems      Return in about 4 weeks (around 4/29/2022) for Medication Check, test results, diabetes check. Bruna Mccain reviewed my findings and recommendations with Jes Fagan.     Electronicallysigned by Bethany Weber MD on 4/1/22 at 10:46 AM CARISAT

## 2022-04-28 DIAGNOSIS — E66.01 SEVERE OBESITY (BMI 35.0-39.9) WITH COMORBIDITY (HCC): ICD-10-CM

## 2022-04-28 DIAGNOSIS — E78.5 HYPERLIPIDEMIA, UNSPECIFIED HYPERLIPIDEMIA TYPE: ICD-10-CM

## 2022-04-28 DIAGNOSIS — N28.9 RENAL INSUFFICIENCY: ICD-10-CM

## 2022-04-28 LAB
ALBUMIN SERPL-MCNC: 4.4 G/DL (ref 3.5–5.2)
ALP BLD-CCNC: 58 U/L (ref 35–104)
ALT SERPL-CCNC: 35 U/L (ref 0–32)
ANION GAP SERPL CALCULATED.3IONS-SCNC: 18 MMOL/L (ref 7–16)
AST SERPL-CCNC: 47 U/L (ref 0–31)
BASOPHILS ABSOLUTE: 0.06 E9/L (ref 0–0.2)
BASOPHILS RELATIVE PERCENT: 0.7 % (ref 0–2)
BILIRUB SERPL-MCNC: 0.5 MG/DL (ref 0–1.2)
BUN BLDV-MCNC: 24 MG/DL (ref 6–23)
CALCIUM SERPL-MCNC: 10.2 MG/DL (ref 8.6–10.2)
CHLORIDE BLD-SCNC: 102 MMOL/L (ref 98–107)
CHOLESTEROL, TOTAL: 216 MG/DL (ref 0–199)
CO2: 24 MMOL/L (ref 22–29)
CREAT SERPL-MCNC: 1.4 MG/DL (ref 0.5–1)
EOSINOPHILS ABSOLUTE: 0.26 E9/L (ref 0.05–0.5)
EOSINOPHILS RELATIVE PERCENT: 2.8 % (ref 0–6)
GFR AFRICAN AMERICAN: 44
GFR NON-AFRICAN AMERICAN: 36 ML/MIN/1.73
GLUCOSE BLD-MCNC: 242 MG/DL (ref 74–99)
HCT VFR BLD CALC: 45.4 % (ref 34–48)
HDLC SERPL-MCNC: 39 MG/DL
HEMOGLOBIN: 14.5 G/DL (ref 11.5–15.5)
IMMATURE GRANULOCYTES #: 0.03 E9/L
IMMATURE GRANULOCYTES %: 0.3 % (ref 0–5)
LDL CHOLESTEROL CALCULATED: 117 MG/DL (ref 0–99)
LYMPHOCYTES ABSOLUTE: 1.46 E9/L (ref 1.5–4)
LYMPHOCYTES RELATIVE PERCENT: 15.8 % (ref 20–42)
MCH RBC QN AUTO: 27.5 PG (ref 26–35)
MCHC RBC AUTO-ENTMCNC: 31.9 % (ref 32–34.5)
MCV RBC AUTO: 86.1 FL (ref 80–99.9)
MONOCYTES ABSOLUTE: 0.69 E9/L (ref 0.1–0.95)
MONOCYTES RELATIVE PERCENT: 7.5 % (ref 2–12)
NEUTROPHILS ABSOLUTE: 6.73 E9/L (ref 1.8–7.3)
NEUTROPHILS RELATIVE PERCENT: 72.9 % (ref 43–80)
PDW BLD-RTO: 14.4 FL (ref 11.5–15)
PLATELET # BLD: 213 E9/L (ref 130–450)
PMV BLD AUTO: 11.3 FL (ref 7–12)
POTASSIUM SERPL-SCNC: 4.9 MMOL/L (ref 3.5–5)
RBC # BLD: 5.27 E12/L (ref 3.5–5.5)
SODIUM BLD-SCNC: 144 MMOL/L (ref 132–146)
TOTAL PROTEIN: 7.7 G/DL (ref 6.4–8.3)
TRIGL SERPL-MCNC: 301 MG/DL (ref 0–149)
VLDLC SERPL CALC-MCNC: 60 MG/DL
WBC # BLD: 9.2 E9/L (ref 4.5–11.5)

## 2022-05-05 ENCOUNTER — OFFICE VISIT (OUTPATIENT)
Dept: FAMILY MEDICINE CLINIC | Age: 82
End: 2022-05-05
Payer: MEDICARE

## 2022-05-05 VITALS
SYSTOLIC BLOOD PRESSURE: 136 MMHG | BODY MASS INDEX: 35.3 KG/M2 | OXYGEN SATURATION: 100 % | DIASTOLIC BLOOD PRESSURE: 80 MMHG | TEMPERATURE: 96.4 F | HEART RATE: 66 BPM | WEIGHT: 193 LBS

## 2022-05-05 DIAGNOSIS — R73.9 HYPERGLYCEMIA: Primary | ICD-10-CM

## 2022-05-05 DIAGNOSIS — N18.30 STAGE 3 CHRONIC KIDNEY DISEASE, UNSPECIFIED WHETHER STAGE 3A OR 3B CKD (HCC): ICD-10-CM

## 2022-05-05 DIAGNOSIS — E78.5 HYPERLIPIDEMIA, UNSPECIFIED HYPERLIPIDEMIA TYPE: ICD-10-CM

## 2022-05-05 DIAGNOSIS — I10 ESSENTIAL HYPERTENSION: ICD-10-CM

## 2022-05-05 DIAGNOSIS — G30.9 ALZHEIMER'S DISEASE, UNSPECIFIED (CODE) (HCC): ICD-10-CM

## 2022-05-05 DIAGNOSIS — E03.9 ACQUIRED HYPOTHYROIDISM: ICD-10-CM

## 2022-05-05 PROCEDURE — 99214 OFFICE O/P EST MOD 30 MIN: CPT | Performed by: FAMILY MEDICINE

## 2022-05-05 ASSESSMENT — ENCOUNTER SYMPTOMS
EYES NEGATIVE: 1
ALLERGIC/IMMUNOLOGIC NEGATIVE: 1
GASTROINTESTINAL NEGATIVE: 1
RESPIRATORY NEGATIVE: 1

## 2022-05-05 NOTE — PATIENT INSTRUCTIONS
Continue present treatment  Take low-sodium low-fat diabetic diet  Regular exercises  Force fluids  Lab work before the next visit  Return to clinic earlier if any problems

## 2022-05-05 NOTE — PROGRESS NOTES
OFFICE PROGRESS NOTE      SUBJECTIVE:        Patient ID:   Vidal Lanza is a 80 y.o. female who presents for   Chief Complaint   Patient presents with    Hypertension     Here for recheck on Hypertension. Reports RT hip and leg discomfort. Lab work done,here for review. HPI:   Patient here for the follow-up with her daughter  Patient states she is feeling okay  Lab work reviewed  LDL is 117  Triglycerides 301  Creatinine 1.4  Fasting sugar was 242  Daughter states patient not taking medication as directed all the time  Patient states he is not having any symptoms  She states she is trying to follow the diet  Does not exercise much  Does take Aricept without any problems        Prior to Visit Medications    Medication Sig Taking? Authorizing Provider   BLOOD GLUCOSE MONITOR 1 each Use once daily  Historical Provider, MD   donepezil (ARICEPT) 5 MG tablet Take 1 tablet by mouth nightly  Suellen Marshall MD   blood glucose monitor strips 1 strip by Other route daily Test 2 times a day & as needed for symptoms of irregular blood glucose. Dispense sufficient amount for indicated testing frequency plus additional to accommodate PRN testing needs. E11.9  Suellen Marshall MD   Lancets Thin MISC 1 each by Does not apply route daily E11.9  Suellen Marshall MD   blood glucose monitor kit and supplies Dispense sufficient amount for indicated testing frequency plus additional to accommodate PRN testing needs. Dispense all needed supplies to include: monitor, strips, lancing device, lancets, control solutions, alcohol swabs.   Suellen Marshall MD   diclofenac sodium (VOLTAREN) 1 % GEL Apply 4 g topically 4 times daily  Suellen Marshall MD   atenolol (TENORMIN) 25 MG tablet Take 1 tablet by mouth 2 times daily  Suellen Marshall MD   levothyroxine (SYNTHROID) 50 MCG tablet Take 1 tablet by mouth Daily  Suellen Marshall MD   metFORMIN (GLUCOPHAGE) 500 MG tablet Take 1 tablet by mouth 2 times daily (with meals)  Genaro Garcia MD   rosuvastatin (CRESTOR) 5 MG tablet TAKE ONE TABLET BY MOUTH EVERY DAY  Genaro Garcia MD   glipiZIDE (GLUCOTROL) 5 MG tablet Take 1 tablet by mouth daily  Genaro Garcia MD   amLODIPine (NORVASC) 5 MG tablet Take 1 tablet by mouth daily  Genaro Garcia MD   diclofenac sodium (VOLTAREN) 1 % GEL Apply 4 g topically 4 times daily as needed (pain)  Beatriz Maciel DO   aspirin (ECOTRIN LOW STRENGTH) 81 MG EC tablet Take by mouth  Historical Provider, MD      Social History     Socioeconomic History    Marital status:      Spouse name: None    Number of children: None    Years of education: None    Highest education level: None   Occupational History    None   Tobacco Use    Smoking status: Never Smoker    Smokeless tobacco: Never Used   Substance and Sexual Activity    Alcohol use: No    Drug use: No    Sexual activity: None   Other Topics Concern    None   Social History Narrative    None     Social Determinants of Health     Financial Resource Strain: Low Risk     Difficulty of Paying Living Expenses: Not hard at all   Food Insecurity: No Food Insecurity    Worried About Running Out of Food in the Last Year: Never true    Rashawn of Food in the Last Year: Never true   Transportation Needs:     Lack of Transportation (Medical): Not on file    Lack of Transportation (Non-Medical):  Not on file   Physical Activity: Inactive    Days of Exercise per Week: 0 days    Minutes of Exercise per Session: 0 min   Stress:     Feeling of Stress : Not on file   Social Connections:     Frequency of Communication with Friends and Family: Not on file    Frequency of Social Gatherings with Friends and Family: Not on file    Attends [employer] Services: Not on file    Active Member of Clubs or Organizations: Not on file    Attends Club or Organization Meetings: Not on file    Marital Status: Not on file   Intimate Partner Violence:  Fear of Current or Ex-Partner: Not on file    Emotionally Abused: Not on file    Physically Abused: Not on file    Sexually Abused: Not on file   Housing Stability:     Unable to Pay for Housing in the Last Year: Not on file    Number of Places Lived in the Last Year: Not on file    Unstable Housing in the Last Year: Not on file       I have reviewed Bernadette's allergies, medications, problem list, medical, social and family history and have updated as needed in the electronic medical record  Review Of Systems:    Review of Systems   Constitutional: Negative. HENT: Negative. Eyes: Negative. Respiratory: Negative. Cardiovascular: Negative. Gastrointestinal: Negative. Endocrine: Negative. Musculoskeletal: Negative. Skin: Negative. Allergic/Immunologic: Negative. Neurological: Negative. Hematological: Negative. Psychiatric/Behavioral: Negative. OBJECTIVE:     VS:  Wt Readings from Last 3 Encounters:   05/05/22 193 lb (87.5 kg)   04/01/22 194 lb (88 kg)   03/04/22 190 lb (86.2 kg)     Temp Readings from Last 3 Encounters:   05/05/22 96.4 °F (35.8 °C) (Infrared)   04/01/22 96.5 °F (35.8 °C) (Infrared)   03/04/22 96.5 °F (35.8 °C) (Infrared)     BP Readings from Last 3 Encounters:   05/05/22 136/80   04/01/22 (!) 186/100   03/04/22 120/72        Physical Exam  Constitutional:       Appearance: She is well-developed. HENT:      Head: Normocephalic and atraumatic. Eyes:      Conjunctiva/sclera: Conjunctivae normal.      Pupils: Pupils are equal, round, and reactive to light. Cardiovascular:      Rate and Rhythm: Normal rate and regular rhythm. Heart sounds: Normal heart sounds. Pulmonary:      Effort: Pulmonary effort is normal.      Breath sounds: Normal breath sounds. Abdominal:      General: Bowel sounds are normal.      Palpations: Abdomen is soft. Musculoskeletal:         General: Normal range of motion.       Cervical back: Normal range of motion and neck supple. Skin:     General: Skin is warm and dry. Neurological:      Mental Status: She is alert and oriented to person, place, and time. Psychiatric:         Thought Content: Thought content normal.            Labs :    Lab Results   Component Value Date    WBC 9.2 04/28/2022    HGB 14.5 04/28/2022    HCT 45.4 04/28/2022     04/28/2022    CHOL 216 (H) 04/28/2022    TRIG 301 (H) 04/28/2022    HDL 39 04/28/2022    ALT 35 (H) 04/28/2022    AST 47 (H) 04/28/2022     04/28/2022    K 4.9 04/28/2022     04/28/2022    CREATININE 1.4 (H) 04/28/2022    BUN 24 (H) 04/28/2022    CO2 24 04/28/2022    TSH 2.380 09/24/2021    LABA1C 7.7 (H) 09/24/2021     No results found for: VOL, APPEARANCE, COLORU, LABSPEC, LABPH, LEUKBLD, NITRU, GLUCOSEU, KETUA, UROBILINOGEN, KETUA, [address], BILIRUBINUR, OCBU  No results found for: PSA, CEA, , LO1539,       Controlled Substances Monitoring:                                    ASSESSMENT     Patient Active Problem List    Diagnosis Date Noted    Chronic renal disease, stage III ([facility] [[id number]] 05/05/2022    Alzheimer's disease, unspecified 03/04/2022    Type 2 diabetes mellitus 02/11/2022    Lumbar spondylosis 07/03/2018    Spinal stenosis of lumbar region 07/03/2018    Essential hypertension 09/18/2017    Gout of multiple sites 09/18/2017    Acquired hypothyroidism 09/18/2017    Trochanteric bursitis, right hip 09/18/2017    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 Hyperglycemia  R73.9 Comprehensive Metabolic Panel   2. Stage 3 chronic kidney disease, unspecified whether stage 3a or 3b CKD (HCC)  N18.30 CBC with Auto Differential   3. Essential hypertension  I10 Comprehensive Metabolic Panel   4. Acquired hypothyroidism  E03.9 T4     TSH   5. Alzheimer's disease, unspecified (CODE) ([facility]  G30.9    6.  Hyperlipidemia, unspecified hyperlipidemia type  E78.5 Comprehensive Metabolic Panel     Lipid Panel       PLAN:   Continue present treatment  Strict low-sodium low-fat diabetic diet  Regular exercises  Lab work before the next visit  Return to clinic earlier if any problems  Patient and the family given all instruction        Patient Instructions   Continue present treatment  Take low-sodium low-fat diabetic diet  Regular exercises  Force fluids  Lab work before the next visit  Return to clinic earlier if any problems      Return in about 3 months (around 8/5/2022) for Medication Check, test results, diabetes check. Brooklynn Stover reviewed my findings and recommendations with Bebo Chaparro.     Electronicallysigned by Luís Aguirre MD on 5/5/22 at 10:36 AM EDT

## 2022-05-09 DIAGNOSIS — E11.9 TYPE 2 DIABETES MELLITUS WITHOUT COMPLICATION, WITHOUT LONG-TERM CURRENT USE OF INSULIN (HCC): Primary | ICD-10-CM

## 2022-05-09 RX ORDER — BLOOD-GLUCOSE CONTROL, NORMAL
1 EACH MISCELLANEOUS DAILY
Qty: 3 EACH | Refills: 5 | Status: SHIPPED | OUTPATIENT
Start: 2022-05-09

## 2022-05-09 NOTE — TELEPHONE ENCOUNTER
Daughter states she needs solution that goes with the glucometer to be able to calibrate the machine. According to RX for glucose monitoring it should have solution. Not sure why they are having a problem. She would like solution ordered for one time at [facility].       Last seen 05/05/2022 in [place]

## 2022-08-01 DIAGNOSIS — N18.30 STAGE 3 CHRONIC KIDNEY DISEASE, UNSPECIFIED WHETHER STAGE 3A OR 3B CKD (HCC): ICD-10-CM

## 2022-08-01 DIAGNOSIS — E78.5 HYPERLIPIDEMIA, UNSPECIFIED HYPERLIPIDEMIA TYPE: ICD-10-CM

## 2022-08-01 DIAGNOSIS — R73.9 HYPERGLYCEMIA: ICD-10-CM

## 2022-08-01 DIAGNOSIS — I10 ESSENTIAL HYPERTENSION: ICD-10-CM

## 2022-08-01 DIAGNOSIS — E03.9 ACQUIRED HYPOTHYROIDISM: ICD-10-CM

## 2022-08-01 LAB
ALBUMIN SERPL-MCNC: 4.6 G/DL (ref 3.5–5.2)
ALP BLD-CCNC: 58 U/L (ref 35–104)
ALT SERPL-CCNC: 31 U/L (ref 0–32)
ANION GAP SERPL CALCULATED.3IONS-SCNC: 14 MMOL/L (ref 7–16)
AST SERPL-CCNC: 42 U/L (ref 0–31)
BASOPHILS ABSOLUTE: 0.07 E9/L (ref 0–0.2)
BASOPHILS RELATIVE PERCENT: 0.8 % (ref 0–2)
BILIRUB SERPL-MCNC: 0.4 MG/DL (ref 0–1.2)
BUN BLDV-MCNC: 21 MG/DL (ref 6–23)
CALCIUM SERPL-MCNC: 10.1 MG/DL (ref 8.6–10.2)
CHLORIDE BLD-SCNC: 103 MMOL/L (ref 98–107)
CHOLESTEROL, TOTAL: 187 MG/DL (ref 0–199)
CO2: 25 MMOL/L (ref 22–29)
CREAT SERPL-MCNC: 1.4 MG/DL (ref 0.5–1)
EOSINOPHILS ABSOLUTE: 0.25 E9/L (ref 0.05–0.5)
EOSINOPHILS RELATIVE PERCENT: 2.8 % (ref 0–6)
GFR AFRICAN AMERICAN: 44
GFR NON-AFRICAN AMERICAN: 36 ML/MIN/1.73
GLUCOSE BLD-MCNC: 244 MG/DL (ref 74–99)
HCT VFR BLD CALC: 43.3 % (ref 34–48)
HDLC SERPL-MCNC: 40 MG/DL
HEMOGLOBIN: 13.6 G/DL (ref 11.5–15.5)
IMMATURE GRANULOCYTES #: 0.02 E9/L
IMMATURE GRANULOCYTES %: 0.2 % (ref 0–5)
LDL CHOLESTEROL CALCULATED: 101 MG/DL (ref 0–99)
LYMPHOCYTES ABSOLUTE: 1.41 E9/L (ref 1.5–4)
LYMPHOCYTES RELATIVE PERCENT: 16 % (ref 20–42)
MCH RBC QN AUTO: 27.7 PG (ref 26–35)
MCHC RBC AUTO-ENTMCNC: 31.4 % (ref 32–34.5)
MCV RBC AUTO: 88.2 FL (ref 80–99.9)
MONOCYTES ABSOLUTE: 0.68 E9/L (ref 0.1–0.95)
MONOCYTES RELATIVE PERCENT: 7.7 % (ref 2–12)
NEUTROPHILS ABSOLUTE: 6.37 E9/L (ref 1.8–7.3)
NEUTROPHILS RELATIVE PERCENT: 72.5 % (ref 43–80)
PDW BLD-RTO: 14.1 FL (ref 11.5–15)
PLATELET # BLD: 206 E9/L (ref 130–450)
PMV BLD AUTO: 11.5 FL (ref 7–12)
POTASSIUM SERPL-SCNC: 5.1 MMOL/L (ref 3.5–5)
RBC # BLD: 4.91 E12/L (ref 3.5–5.5)
SODIUM BLD-SCNC: 142 MMOL/L (ref 132–146)
T4 TOTAL: 8.4 MCG/DL (ref 4.5–11.7)
TOTAL PROTEIN: 8 G/DL (ref 6.4–8.3)
TRIGL SERPL-MCNC: 228 MG/DL (ref 0–149)
TSH SERPL DL<=0.05 MIU/L-ACNC: 48.03 UIU/ML (ref 0.27–4.2)
VLDLC SERPL CALC-MCNC: 46 MG/DL
WBC # BLD: 8.8 E9/L (ref 4.5–11.5)

## 2022-08-05 ENCOUNTER — OFFICE VISIT (OUTPATIENT)
Dept: FAMILY MEDICINE CLINIC | Age: 82
End: 2022-08-05
Payer: MEDICARE

## 2022-08-05 VITALS
TEMPERATURE: 97 F | SYSTOLIC BLOOD PRESSURE: 130 MMHG | BODY MASS INDEX: 35.12 KG/M2 | DIASTOLIC BLOOD PRESSURE: 80 MMHG | WEIGHT: 192 LBS | HEART RATE: 74 BPM | OXYGEN SATURATION: 97 %

## 2022-08-05 DIAGNOSIS — I10 ESSENTIAL HYPERTENSION: ICD-10-CM

## 2022-08-05 DIAGNOSIS — E11.22 TYPE 2 DIABETES MELLITUS WITH CHRONIC KIDNEY DISEASE, WITHOUT LONG-TERM CURRENT USE OF INSULIN, UNSPECIFIED CKD STAGE (HCC): ICD-10-CM

## 2022-08-05 DIAGNOSIS — G30.9 ALZHEIMER'S DISEASE, UNSPECIFIED (CODE) (HCC): ICD-10-CM

## 2022-08-05 DIAGNOSIS — E78.5 HYPERLIPIDEMIA, UNSPECIFIED HYPERLIPIDEMIA TYPE: ICD-10-CM

## 2022-08-05 DIAGNOSIS — E11.9 TYPE 2 DIABETES MELLITUS WITHOUT COMPLICATION, WITHOUT LONG-TERM CURRENT USE OF INSULIN (HCC): Primary | ICD-10-CM

## 2022-08-05 DIAGNOSIS — E03.9 ACQUIRED HYPOTHYROIDISM: ICD-10-CM

## 2022-08-05 DIAGNOSIS — E66.01 SEVERE OBESITY (BMI 35.0-39.9) WITH COMORBIDITY (HCC): ICD-10-CM

## 2022-08-05 PROCEDURE — 99214 OFFICE O/P EST MOD 30 MIN: CPT | Performed by: FAMILY MEDICINE

## 2022-08-05 PROCEDURE — 1123F ACP DISCUSS/DSCN MKR DOCD: CPT | Performed by: FAMILY MEDICINE

## 2022-08-05 RX ORDER — LEVOTHYROXINE SODIUM 88 MCG
88 TABLET ORAL DAILY
Qty: 30 TABLET | Refills: 3
Start: 2022-08-05 | End: 2022-08-08 | Stop reason: SDUPTHER

## 2022-08-05 RX ORDER — AMLODIPINE BESYLATE 5 MG/1
5 TABLET ORAL DAILY
Qty: 90 TABLET | Refills: 1 | Status: SHIPPED | OUTPATIENT
Start: 2022-08-05

## 2022-08-05 ASSESSMENT — ENCOUNTER SYMPTOMS
ALLERGIC/IMMUNOLOGIC NEGATIVE: 1
RESPIRATORY NEGATIVE: 1
GASTROINTESTINAL NEGATIVE: 1
EYES NEGATIVE: 1

## 2022-08-05 NOTE — PROGRESS NOTES
OFFICE PROGRESS NOTE      SUBJECTIVE:        Patient ID:   Compa Chambers is a 80 y.o. female who presents for   Chief Complaint   Patient presents with    Hypertension     Here for recheck on Hypertension,Hypothyroidism and DM. Patient reports feeling well. Lab work done,here for review. HPI:   Patient here for the follow-up with her daughter  Lab work reviewed  Fasting sugar 244  Triglyceride 228  LDL of 101  Creatinine 1.4  TSH 48.030  Patient does not follow the diet or exercise  States she is taking her medication as prescribed        Prior to Visit Medications    Medication Sig Taking? Authorizing Provider   Blood Glucose Calibration (QUICKTEK CONTROL SOLUTION) LIQD 1 each by In Vitro route daily Control solution for patient glucose meter to use daily Yes Magaly Reardon MD   BLOOD GLUCOSE MONITOR 1 each Use once daily Yes Historical Provider, MD   donepezil (ARICEPT) 5 MG tablet Take 1 tablet by mouth nightly Yes Magaly Reardon MD   blood glucose monitor strips 1 strip by Other route daily Test 2 times a day & as needed for symptoms of irregular blood glucose. Dispense sufficient amount for indicated testing frequency plus additional to accommodate PRN testing needs. E11.9 Yes Magaly Reardon MD   Lancets Thin MISC 1 each by Does not apply route daily E11.9 Yes Magaly Reardon MD   blood glucose monitor kit and supplies Dispense sufficient amount for indicated testing frequency plus additional to accommodate PRN testing needs. Dispense all needed supplies to include: monitor, strips, lancing device, lancets, control solutions, alcohol swabs.  Yes Magaly Reardon MD   diclofenac sodium (VOLTAREN) 1 % GEL Apply 4 g topically 4 times daily Yes Magaly Reardon MD   atenolol (TENORMIN) 25 MG tablet Take 1 tablet by mouth 2 times daily Yes Magaly Reardon MD   levothyroxine (SYNTHROID) 50 MCG tablet Take 1 tablet by mouth Daily Yes Magaly Reardon MD metFORMIN (GLUCOPHAGE) 500 MG tablet Take 1 tablet by mouth 2 times daily (with meals) Yes So Marroquin MD   rosuvastatin (CRESTOR) 5 MG tablet TAKE ONE TABLET BY MOUTH EVERY DAY Yes So Marroquin MD   glipiZIDE (GLUCOTROL) 5 MG tablet Take 1 tablet by mouth daily Yes So Marroquin MD   amLODIPine (NORVASC) 5 MG tablet Take 1 tablet by mouth daily Yes So Marroquin MD   diclofenac sodium (VOLTAREN) 1 % GEL Apply 4 g topically 4 times daily as needed (pain) Yes Lola Deeds, DO   aspirin 81 MG EC tablet Take by mouth Yes Historical Provider, MD      Social History     Socioeconomic History    Marital status:      Spouse name: None    Number of children: None    Years of education: None    Highest education level: None   Tobacco Use    Smoking status: Never    Smokeless tobacco: Never   Substance and Sexual Activity    Alcohol use: No    Drug use: No     Social Determinants of Health     Financial Resource Strain: Low Risk     Difficulty of Paying Living Expenses: Not hard at all   Food Insecurity: No Food Insecurity    Worried About Running Out of Food in the Last Year: Never true    Ran Out of Food in the Last Year: Never true   Physical Activity: Inactive    Days of Exercise per Week: 0 days    Minutes of Exercise per Session: 0 min       I have reviewed Bernadette's allergies, medications, problem list, medical, social and family history and have updated as needed in the electronic medical record  Review Of Systems:    Review of Systems   Constitutional: Negative. HENT: Negative. Eyes: Negative. Respiratory: Negative. Cardiovascular: Negative. Gastrointestinal: Negative. Endocrine: Negative. Musculoskeletal: Negative. Skin: Negative. Allergic/Immunologic: Negative. Neurological: Negative. Hematological: Negative. Psychiatric/Behavioral: Negative.               OBJECTIVE:     VS:  Wt Readings from Last 3 Encounters:   08/05/22 192 lb (87.1 kg) 05/05/22 193 lb (87.5 kg)   04/01/22 194 lb (88 kg)     Temp Readings from Last 3 Encounters:   08/05/22 97 °F (36.1 °C) (Infrared)   05/05/22 96.4 °F (35.8 °C) (Infrared)   04/01/22 96.5 °F (35.8 °C) (Infrared)     BP Readings from Last 3 Encounters:   08/05/22 130/80   05/05/22 136/80   04/01/22 (!) 186/100        Physical Exam  Constitutional:       Appearance: She is well-developed. HENT:      Head: Normocephalic and atraumatic. Eyes:      Conjunctiva/sclera: Conjunctivae normal.      Pupils: Pupils are equal, round, and reactive to light. Cardiovascular:      Rate and Rhythm: Normal rate and regular rhythm. Heart sounds: Normal heart sounds. Pulmonary:      Effort: Pulmonary effort is normal.      Breath sounds: Normal breath sounds. Abdominal:      General: Bowel sounds are normal.      Palpations: Abdomen is soft. Musculoskeletal:         General: Normal range of motion. Cervical back: Normal range of motion and neck supple. Skin:     General: Skin is warm and dry. Neurological:      Mental Status: She is alert and oriented to person, place, and time. Psychiatric:         Thought Content:  Thought content normal.          Labs :    Lab Results   Component Value Date    WBC 8.8 08/01/2022    HGB 13.6 08/01/2022    HCT 43.3 08/01/2022     08/01/2022    CHOL 187 08/01/2022    TRIG 228 (H) 08/01/2022    HDL 40 08/01/2022    ALT 31 08/01/2022    AST 42 (H) 08/01/2022     08/01/2022    K 5.1 (H) 08/01/2022     08/01/2022    CREATININE 1.4 (H) 08/01/2022    BUN 21 08/01/2022    CO2 25 08/01/2022    TSH 48.030 (H) 08/01/2022    LABA1C 7.7 (H) 09/24/2021     No results found for: VOL, APPEARANCE, COLORU, LABSPEC, LABPH, LEUKBLD, NITRU, GLUCOSEU, KETUA, UROBILINOGEN, KETUA, UROBILINOGEN, BILIRUBINUR, OCBU  No results found for: PSA, CEA, , [id number],       Controlled Substances Monitoring:                                    ASSESSMENT     Patient Active Problem List    Diagnosis Date Noted    Type 2 diabetes mellitus with chronic kidney disease 08/05/2022    Chronic renal disease, stage III ([facility] [[id number]] 05/05/2022    Alzheimer's disease, unspecified 03/04/2022    Type 2 diabetes mellitus 02/11/2022    Lumbar spondylosis 07/03/2018    Spinal stenosis of lumbar region 07/03/2018    Essential hypertension 09/18/2017    Gout of multiple sites 09/18/2017    Acquired hypothyroidism 09/18/2017    Trochanteric bursitis, right hip 09/18/2017    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 Type 2 diabetes mellitus without complication, without long-term current use of insulin (HCC)  E11.9 CBC with Auto Differential     Comprehensive Metabolic Panel     Lipid Panel      2. Essential hypertension  I10 amLODIPine (NORVASC) 5 MG tablet     CBC with Auto Differential      3. Type 2 diabetes mellitus with chronic kidney disease, without long-term current use of insulin, unspecified CKD stage (HCC)  E11.22 CBC with Auto Differential      4. Acquired hypothyroidism  E03.9 T4     TSH      5. Alzheimer's disease, unspecified (CODE) ([facility]  G30.9       6. Severe obesity (BMI 35.0-39. 9) with comorbidity ([facility]  E66.01 CBC with Auto Differential      7. Hyperlipidemia, unspecified hyperlipidemia type  E78.5 Comprehensive Metabolic Panel     Lipid Panel          PLAN:   Continue present treatment  Low-sodium low-fat low-carb weight reduction diet  Regular exercises  Synthroid increased to 88 mg  Lab work before the next visit  Return to clinic earlier if any problems        Patient Instructions   Increase the dose of Synthroid to 88  Strict low-sodium low-fat low diabetic weight reduction diet  Regular exercises  Lab work before the next visit  Return to clinic earlier if any problems    Return in about 3 months (around 11/5/2022) for Medication Check, test results, diabetes check.          Erika Gunn reviewed my findings and recommendations with Malachi Russ.     Electronicallysigned by Tiana Price MD on 8/5/22 at 9:54 AM EDT

## 2022-08-05 NOTE — PATIENT INSTRUCTIONS
Increase the dose of Synthroid to 88  Strict low-sodium low-fat low diabetic weight reduction diet  Regular exercises  Lab work before the next visit  Return to clinic earlier if any problems

## 2022-08-08 RX ORDER — LEVOTHYROXINE SODIUM 88 MCG
88 TABLET ORAL DAILY
Qty: 90 TABLET | Refills: 1 | Status: SHIPPED | OUTPATIENT
Start: 2022-08-08

## 2022-08-08 NOTE — TELEPHONE ENCOUNTER
Daughter called stating [facility] never received thyroid medication, I did check and it was not received at the pharmacy.

## 2022-08-16 DIAGNOSIS — E03.9 ACQUIRED HYPOTHYROIDISM: Primary | ICD-10-CM

## 2022-08-16 RX ORDER — LEVOTHYROXINE SODIUM 88 UG/1
88 TABLET ORAL DAILY
COMMUNITY
End: 2022-08-16 | Stop reason: SDUPTHER

## 2022-08-16 RX ORDER — LEVOTHYROXINE SODIUM 88 UG/1
88 TABLET ORAL DAILY
Qty: 90 TABLET | Refills: 1 | Status: SHIPPED | OUTPATIENT
Start: 2022-08-16

## 2022-10-17 DIAGNOSIS — R73.9 HYPERGLYCEMIA: ICD-10-CM

## 2022-10-26 DIAGNOSIS — I10 ESSENTIAL HYPERTENSION: ICD-10-CM

## 2022-10-26 RX ORDER — ATENOLOL 25 MG/1
25 TABLET ORAL 2 TIMES DAILY
Qty: 180 TABLET | Refills: 0 | Status: SHIPPED
Start: 2022-10-26 | End: 2022-11-10 | Stop reason: SDUPTHER

## 2022-11-07 DIAGNOSIS — E11.22 TYPE 2 DIABETES MELLITUS WITH CHRONIC KIDNEY DISEASE, WITHOUT LONG-TERM CURRENT USE OF INSULIN, UNSPECIFIED CKD STAGE (HCC): ICD-10-CM

## 2022-11-07 DIAGNOSIS — E11.9 TYPE 2 DIABETES MELLITUS WITHOUT COMPLICATION, WITHOUT LONG-TERM CURRENT USE OF INSULIN (HCC): ICD-10-CM

## 2022-11-07 DIAGNOSIS — E66.01 SEVERE OBESITY (BMI 35.0-39.9) WITH COMORBIDITY (HCC): ICD-10-CM

## 2022-11-07 DIAGNOSIS — I10 ESSENTIAL HYPERTENSION: ICD-10-CM

## 2022-11-07 DIAGNOSIS — E03.9 ACQUIRED HYPOTHYROIDISM: ICD-10-CM

## 2022-11-07 DIAGNOSIS — N28.9 RENAL INSUFFICIENCY: ICD-10-CM

## 2022-11-07 DIAGNOSIS — E78.5 HYPERLIPIDEMIA, UNSPECIFIED HYPERLIPIDEMIA TYPE: ICD-10-CM

## 2022-11-07 LAB
ALBUMIN SERPL-MCNC: 4.5 G/DL (ref 3.5–5.2)
ALP BLD-CCNC: 53 U/L (ref 35–104)
ALT SERPL-CCNC: 26 U/L (ref 0–32)
ANION GAP SERPL CALCULATED.3IONS-SCNC: 14 MMOL/L (ref 7–16)
AST SERPL-CCNC: 29 U/L (ref 0–31)
BASOPHILS ABSOLUTE: 0.08 E9/L (ref 0–0.2)
BASOPHILS RELATIVE PERCENT: 1 % (ref 0–2)
BILIRUB SERPL-MCNC: 0.4 MG/DL (ref 0–1.2)
BUN BLDV-MCNC: 23 MG/DL (ref 6–23)
CALCIUM SERPL-MCNC: 10.1 MG/DL (ref 8.6–10.2)
CHLORIDE BLD-SCNC: 98 MMOL/L (ref 98–107)
CHOLESTEROL, TOTAL: 187 MG/DL (ref 0–199)
CO2: 25 MMOL/L (ref 22–29)
CREAT SERPL-MCNC: 1.3 MG/DL (ref 0.5–1)
EOSINOPHILS ABSOLUTE: 0.24 E9/L (ref 0.05–0.5)
EOSINOPHILS RELATIVE PERCENT: 2.9 % (ref 0–6)
GFR SERPL CREATININE-BSD FRML MDRD: 41 ML/MIN/1.73
GLUCOSE BLD-MCNC: 278 MG/DL (ref 74–99)
HCT VFR BLD CALC: 44.3 % (ref 34–48)
HDLC SERPL-MCNC: 35 MG/DL
HEMOGLOBIN: 14.1 G/DL (ref 11.5–15.5)
IMMATURE GRANULOCYTES #: 0.02 E9/L
IMMATURE GRANULOCYTES %: 0.2 % (ref 0–5)
LDL CHOLESTEROL CALCULATED: 97 MG/DL (ref 0–99)
LYMPHOCYTES ABSOLUTE: 1.64 E9/L (ref 1.5–4)
LYMPHOCYTES RELATIVE PERCENT: 19.5 % (ref 20–42)
MCH RBC QN AUTO: 28.6 PG (ref 26–35)
MCHC RBC AUTO-ENTMCNC: 31.8 % (ref 32–34.5)
MCV RBC AUTO: 89.9 FL (ref 80–99.9)
MONOCYTES ABSOLUTE: 0.63 E9/L (ref 0.1–0.95)
MONOCYTES RELATIVE PERCENT: 7.5 % (ref 2–12)
NEUTROPHILS ABSOLUTE: 5.78 E9/L (ref 1.8–7.3)
NEUTROPHILS RELATIVE PERCENT: 68.9 % (ref 43–80)
PDW BLD-RTO: 13.1 FL (ref 11.5–15)
PLATELET # BLD: 217 E9/L (ref 130–450)
PMV BLD AUTO: 11.6 FL (ref 7–12)
POTASSIUM SERPL-SCNC: 4.8 MMOL/L (ref 3.5–5)
RBC # BLD: 4.93 E12/L (ref 3.5–5.5)
SODIUM BLD-SCNC: 137 MMOL/L (ref 132–146)
T4 TOTAL: 11.9 MCG/DL (ref 4.5–11.7)
TOTAL PROTEIN: 7.6 G/DL (ref 6.4–8.3)
TRIGL SERPL-MCNC: 276 MG/DL (ref 0–149)
TSH SERPL DL<=0.05 MIU/L-ACNC: 1.3 UIU/ML (ref 0.27–4.2)
VLDLC SERPL CALC-MCNC: 55 MG/DL
WBC # BLD: 8.4 E9/L (ref 4.5–11.5)

## 2022-11-10 ENCOUNTER — OFFICE VISIT (OUTPATIENT)
Dept: FAMILY MEDICINE CLINIC | Age: 82
End: 2022-11-10
Payer: MEDICARE

## 2022-11-10 VITALS
HEART RATE: 63 BPM | DIASTOLIC BLOOD PRESSURE: 78 MMHG | WEIGHT: 188 LBS | SYSTOLIC BLOOD PRESSURE: 130 MMHG | TEMPERATURE: 97 F | OXYGEN SATURATION: 99 % | BODY MASS INDEX: 34.39 KG/M2

## 2022-11-10 DIAGNOSIS — R73.9 HYPERGLYCEMIA: ICD-10-CM

## 2022-11-10 DIAGNOSIS — I10 ESSENTIAL HYPERTENSION: ICD-10-CM

## 2022-11-10 DIAGNOSIS — E11.9 TYPE 2 DIABETES MELLITUS WITHOUT COMPLICATION, WITHOUT LONG-TERM CURRENT USE OF INSULIN (HCC): Primary | ICD-10-CM

## 2022-11-10 DIAGNOSIS — E03.9 ACQUIRED HYPOTHYROIDISM: ICD-10-CM

## 2022-11-10 DIAGNOSIS — G30.9 ALZHEIMER'S DISEASE, UNSPECIFIED (CODE) (HCC): ICD-10-CM

## 2022-11-10 LAB — HBA1C MFR BLD: 12.3 %

## 2022-11-10 PROCEDURE — 3074F SYST BP LT 130 MM HG: CPT | Performed by: FAMILY MEDICINE

## 2022-11-10 PROCEDURE — 99214 OFFICE O/P EST MOD 30 MIN: CPT | Performed by: FAMILY MEDICINE

## 2022-11-10 PROCEDURE — 1123F ACP DISCUSS/DSCN MKR DOCD: CPT | Performed by: FAMILY MEDICINE

## 2022-11-10 PROCEDURE — 83036 HEMOGLOBIN GLYCOSYLATED A1C: CPT | Performed by: FAMILY MEDICINE

## 2022-11-10 PROCEDURE — 3046F HEMOGLOBIN A1C LEVEL >9.0%: CPT | Performed by: FAMILY MEDICINE

## 2022-11-10 PROCEDURE — 3078F DIAST BP <80 MM HG: CPT | Performed by: FAMILY MEDICINE

## 2022-11-10 RX ORDER — ATENOLOL 25 MG/1
25 TABLET ORAL 2 TIMES DAILY
Qty: 180 TABLET | Refills: 1 | Status: SHIPPED | OUTPATIENT
Start: 2022-11-10

## 2022-11-10 RX ORDER — DONEPEZIL HYDROCHLORIDE 5 MG/1
5 TABLET, FILM COATED ORAL NIGHTLY
Qty: 90 TABLET | Refills: 1 | Status: SHIPPED | OUTPATIENT
Start: 2022-11-10

## 2022-11-10 RX ORDER — LEVOTHYROXINE SODIUM 88 UG/1
88 TABLET ORAL DAILY
Qty: 90 TABLET | Refills: 1 | Status: SHIPPED | OUTPATIENT
Start: 2022-11-10

## 2022-11-10 ASSESSMENT — ENCOUNTER SYMPTOMS
GASTROINTESTINAL NEGATIVE: 1
RESPIRATORY NEGATIVE: 1
ALLERGIC/IMMUNOLOGIC NEGATIVE: 1
EYES NEGATIVE: 1

## 2022-11-10 NOTE — PROGRESS NOTES
OFFICE PROGRESS NOTE      SUBJECTIVE:        Patient ID:   Paulette Reyes is a 80 y.o. female who presents for   Chief Complaint   Patient presents with    Diabetes     Here for recheck on DM,Hypertension,Hyperlipidemia and Hypothyroidism. Lab work done,here for review. HPI:   Patient here with the family member  No specific complaint  Lab work reviewed  Creatinine 1.3  Sugar 278  Patient noncompliant with the diet  Does not exercise  Still has memory problems        Prior to Visit Medications    Medication Sig Taking? Authorizing Provider   atenolol (TENORMIN) 25 MG tablet Take 1 tablet by mouth in the morning and 1 tablet in the evening. Laura Brown MD   metFORMIN (GLUCOPHAGE) 500 MG tablet Take 1 tablet by mouth 2 times daily (with meals)  Laura Brown MD   levothyroxine (SYNTHROID) 88 MCG tablet Take 1 tablet by mouth in the morning. Laura Brown MD   SYNTHROID 88 MCG tablet Take 1 tablet by mouth in the morning. Laura Brown MD   amLODIPine (NORVASC) 5 MG tablet Take 1 tablet by mouth in the morning. Laura Brown MD   Blood Glucose Calibration (QUICKTEK CONTROL SOLUTION) LIQD 1 each by In Vitro route daily Control solution for patient glucose meter to use daily  Laura Brown MD   BLOOD GLUCOSE MONITOR 1 each Use once daily  Historical Provider, MD   donepezil (ARICEPT) 5 MG tablet Take 1 tablet by mouth nightly  Laura Brown MD   blood glucose monitor strips 1 strip by Other route daily Test 2 times a day & as needed for symptoms of irregular blood glucose. Dispense sufficient amount for indicated testing frequency plus additional to accommodate PRN testing needs. E11.9  Laura Brown MD   Lancets Thin MISC 1 each by Does not apply route daily E11.9  Laura Brown MD   blood glucose monitor kit and supplies Dispense sufficient amount for indicated testing frequency plus additional to accommodate PRN testing needs. Dispense all needed supplies to include: monitor, strips, lancing device, lancets, control solutions, alcohol swabs. Denver Lockett MD   diclofenac sodium (VOLTAREN) 1 % GEL Apply 4 g topically 4 times daily  Denver Lockett MD   rosuvastatin (CRESTOR) 5 MG tablet TAKE ONE TABLET BY MOUTH EVERY DAY  Denver Lockett MD   glipiZIDE (GLUCOTROL) 5 MG tablet Take 1 tablet by mouth daily  Denver Lockett MD   diclofenac sodium (VOLTAREN) 1 % GEL Apply 4 g topically 4 times daily as needed (pain)  Shena Leiva DO   aspirin 81 MG EC tablet Take by mouth  Historical Provider, MD      Social History     Socioeconomic History    Marital status:      Spouse name: None    Number of children: None    Years of education: None    Highest education level: None   Tobacco Use    Smoking status: Never    Smokeless tobacco: Never   Substance and Sexual Activity    Alcohol use: No    Drug use: No     Social Determinants of Health     Physical Activity: Inactive    Days of Exercise per Week: 0 days    Minutes of Exercise per Session: 0 min       I have reviewed Bernadette's allergies, medications, problem list, medical, social and family history and have updated as needed in the electronic medical record  Review Of Systems:    Review of Systems   Constitutional: Negative. HENT: Negative. Eyes: Negative. Respiratory: Negative. Cardiovascular: Negative. Gastrointestinal: Negative. Endocrine: Negative. Musculoskeletal: Negative. Skin: Negative. Allergic/Immunologic: Negative. Neurological: Negative. Hematological: Negative. Psychiatric/Behavioral: Negative.               OBJECTIVE:     VS:  Wt Readings from Last 3 Encounters:   11/10/22 188 lb (85.3 kg)   08/05/22 192 lb (87.1 kg)   05/05/22 193 lb (87.5 kg)     Temp Readings from Last 3 Encounters:   11/10/22 97 °F (36.1 °C) (Infrared)   08/05/22 97 °F (36.1 °C) (Infrared)   05/05/22 96.4 °F (35.8 °C) (Infrared)     BP Readings from Last 3 Encounters:   11/10/22 130/78   08/05/22 130/80   05/05/22 136/80        Physical Exam  Constitutional:       Appearance: She is well-developed. HENT:      Head: Normocephalic and atraumatic. Eyes:      Conjunctiva/sclera: Conjunctivae normal.      Pupils: Pupils are equal, round, and reactive to light. Cardiovascular:      Rate and Rhythm: Normal rate and regular rhythm. Heart sounds: Normal heart sounds. Pulmonary:      Effort: Pulmonary effort is normal.      Breath sounds: Normal breath sounds. Abdominal:      General: Bowel sounds are normal.      Palpations: Abdomen is soft. Musculoskeletal:         General: Normal range of motion. Cervical back: Normal range of motion and neck supple. Skin:     General: Skin is warm and dry. Neurological:      Mental Status: She is alert and oriented to person, place, and time. Psychiatric:         Thought Content:  Thought content normal.          Labs :    Lab Results   Component Value Date    WBC 8.4 11/07/2022    HGB 14.1 11/07/2022    HCT 44.3 11/07/2022     11/07/2022    CHOL 187 11/07/2022    TRIG 276 (H) 11/07/2022    HDL 35 11/07/2022    ALT 26 11/07/2022    AST 29 11/07/2022     11/07/2022    K 4.8 11/07/2022    CL 98 11/07/2022    CREATININE 1.3 (H) 11/07/2022    BUN 23 11/07/2022    CO2 25 11/07/2022    TSH 1.300 11/07/2022    LABA1C 12.3 11/10/2022     No results found for: VOL, APPEARANCE, COLORU, LABSPEC, LABPH, LEUKBLD, NITRU, GLUCOSEU, KETUA, UROBILINOGEN, KETUA, UROBILINOGEN, BILIRUBINUR, OCBU  No results found for: PSA, CEA, , [id number],       Controlled Substances Monitoring:                                    ASSESSMENT     Patient Active Problem List    Diagnosis Date Noted    Type 2 diabetes mellitus with chronic kidney disease 08/05/2022    Chronic renal disease, stage III ([facility] [address] [[id number]] 05/05/2022    Alzheimer's disease, unspecified 03/04/2022    Type 2 diabetes mellitus 02/11/2022    Lumbar spondylosis 07/03/2018    Spinal stenosis of lumbar region 07/03/2018    Essential hypertension 09/18/2017    Gout of multiple sites 09/18/2017    Acquired hypothyroidism 09/18/2017    Trochanteric bursitis, right hip 09/18/2017    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 Type 2 diabetes mellitus without complication, without long-term current use of insulin (HCC)  E11.9 POCT glycosylated hemoglobin (Hb A1C)      2. Hyperglycemia  R73.9       3. Acquired hypothyroidism  E03.9 levothyroxine (SYNTHROID) 88 MCG tablet      4. Essential hypertension  I10 atenolol (TENORMIN) 25 MG tablet      5. Alzheimer's disease, unspecified (CODE) ([facility]  G30.9 donepezil (ARICEPT) 5 MG tablet          PLAN:   Continue present treatment  Strict low-sodium low-fat low-carb diet  Regular exercises  Neurology referral patient and the family refused  Return to clinic earlier if any problem          Patient Instructions   Strict low-sodium low-fat low-carb diet  Regular exercises  Take the medication as prescribed  Return to clinic earlier if any problems    Return in about 4 weeks (around 12/8/2022) for Medication Check. Annia Bond reviewed my findings and recommendations with Valentino Garcia.     Electronicallysigned by Zurdo Rangel MD on 11/10/22 at 10:46 AM EST

## 2022-12-09 ENCOUNTER — OFFICE VISIT (OUTPATIENT)
Dept: FAMILY MEDICINE CLINIC | Age: 82
End: 2022-12-09

## 2022-12-09 VITALS
OXYGEN SATURATION: 97 % | TEMPERATURE: 97 F | HEART RATE: 74 BPM | DIASTOLIC BLOOD PRESSURE: 74 MMHG | BODY MASS INDEX: 32.92 KG/M2 | WEIGHT: 180 LBS | SYSTOLIC BLOOD PRESSURE: 136 MMHG

## 2022-12-09 DIAGNOSIS — N18.30 STAGE 3 CHRONIC KIDNEY DISEASE, UNSPECIFIED WHETHER STAGE 3A OR 3B CKD (HCC): ICD-10-CM

## 2022-12-09 DIAGNOSIS — E03.9 ACQUIRED HYPOTHYROIDISM: ICD-10-CM

## 2022-12-09 DIAGNOSIS — E78.5 HYPERLIPIDEMIA, UNSPECIFIED HYPERLIPIDEMIA TYPE: ICD-10-CM

## 2022-12-09 DIAGNOSIS — E11.22 TYPE 2 DIABETES MELLITUS WITH STAGE 3 CHRONIC KIDNEY DISEASE, WITHOUT LONG-TERM CURRENT USE OF INSULIN, UNSPECIFIED WHETHER STAGE 3A OR 3B CKD (HCC): ICD-10-CM

## 2022-12-09 DIAGNOSIS — I10 ESSENTIAL HYPERTENSION: ICD-10-CM

## 2022-12-09 DIAGNOSIS — E66.01 SEVERE OBESITY (BMI 35.0-39.9) WITH COMORBIDITY (HCC): ICD-10-CM

## 2022-12-09 DIAGNOSIS — G30.9 ALZHEIMER'S DISEASE, UNSPECIFIED (CODE) (HCC): ICD-10-CM

## 2022-12-09 DIAGNOSIS — N18.30 TYPE 2 DIABETES MELLITUS WITH STAGE 3 CHRONIC KIDNEY DISEASE, WITHOUT LONG-TERM CURRENT USE OF INSULIN, UNSPECIFIED WHETHER STAGE 3A OR 3B CKD (HCC): ICD-10-CM

## 2022-12-09 DIAGNOSIS — E11.9 TYPE 2 DIABETES MELLITUS WITHOUT COMPLICATION, WITHOUT LONG-TERM CURRENT USE OF INSULIN (HCC): Primary | ICD-10-CM

## 2022-12-09 LAB
CHP ED QC CHECK: NORMAL
GLUCOSE BLD-MCNC: 267 MG/DL

## 2022-12-09 SDOH — ECONOMIC STABILITY: FOOD INSECURITY: WITHIN THE PAST 12 MONTHS, THE FOOD YOU BOUGHT JUST DIDN'T LAST AND YOU DIDN'T HAVE MONEY TO GET MORE.: NEVER TRUE

## 2022-12-09 SDOH — ECONOMIC STABILITY: FOOD INSECURITY: WITHIN THE PAST 12 MONTHS, YOU WORRIED THAT YOUR FOOD WOULD RUN OUT BEFORE YOU GOT MONEY TO BUY MORE.: NEVER TRUE

## 2022-12-09 ASSESSMENT — ENCOUNTER SYMPTOMS
RESPIRATORY NEGATIVE: 1
ALLERGIC/IMMUNOLOGIC NEGATIVE: 1
EYES NEGATIVE: 1
GASTROINTESTINAL NEGATIVE: 1

## 2022-12-09 ASSESSMENT — SOCIAL DETERMINANTS OF HEALTH (SDOH): HOW HARD IS IT FOR YOU TO PAY FOR THE VERY BASICS LIKE FOOD, HOUSING, MEDICAL CARE, AND HEATING?: NOT HARD AT ALL

## 2023-02-02 ENCOUNTER — TELEPHONE (OUTPATIENT)
Dept: FAMILY MEDICINE CLINIC | Age: 83
End: 2023-02-02

## 2023-02-02 NOTE — TELEPHONE ENCOUNTER
Spoke to [employer] . about wife Lorraine Valdez. He said her cough is new. Will have her test for covid and inform office if results positive.

## 2023-02-02 NOTE — TELEPHONE ENCOUNTER
Patient exposed to covid by family member, cough is the lone symptom at this time. Daughter has home tests, has not used yet. Patient exposed 01/28/2023. Please advise.    Last seen 12/9/2022  Next appt 3/16/2023  Also patient would like to know when to test.

## 2023-02-03 RX ORDER — METHYLPREDNISOLONE 4 MG/1
TABLET ORAL
Qty: 1 KIT | Refills: 0 | OUTPATIENT
Start: 2023-02-03 | End: 2023-02-09

## 2023-02-03 RX ORDER — AZITHROMYCIN 250 MG/1
250 TABLET, FILM COATED ORAL SEE ADMIN INSTRUCTIONS
Qty: 6 TABLET | Refills: 0 | OUTPATIENT
Start: 2023-02-03 | End: 2023-02-08

## 2023-02-03 RX ORDER — DEXTROMETHORPHAN HYDROBROMIDE AND PROMETHAZINE HYDROCHLORIDE 15; 6.25 MG/5ML; MG/5ML
5 SYRUP ORAL 4 TIMES DAILY PRN
Qty: 240 ML | Refills: 0 | OUTPATIENT
Start: 2023-02-03 | End: 2023-02-10

## 2023-02-03 NOTE — TELEPHONE ENCOUNTER
Patient's daughter, Abril, called to follow up on RX stating the pharmacy has not rec'd orders.  I called [facility] and gave verbal orders to Emily/pharmacist.  I called Abril back and informed orders were given and RX will be ready shortly for .

## 2023-02-03 NOTE — TELEPHONE ENCOUNTER
Patients daughter Becky Carter called pt tested Positive this morning for Covid. She is asking for Rx . Pt does have cough and fever. Please advise.   Last seen 12/9/2022  Next appt 3/16/2023

## 2023-02-03 NOTE — TELEPHONE ENCOUNTER
Per Dr Mondragon Pel:  Angela Mckenzie and Phenergan DM sent to pharmacy. Hold statin x 1 week (through the treatment). Advise sugars may elevate and if symptoms worsen, go to ER. Pt's dgt Maverick Cheung informed and verbalized understanding. Asking for Rxs to be sent to [facility].

## 2023-02-13 ENCOUNTER — TELEPHONE (OUTPATIENT)
Dept: FAMILY MEDICINE CLINIC | Age: 83
End: 2023-02-13

## 2023-02-13 ENCOUNTER — APPOINTMENT (OUTPATIENT)
Dept: GENERAL RADIOLOGY | Age: 83
End: 2023-02-13
Payer: MEDICARE

## 2023-02-13 ENCOUNTER — HOSPITAL ENCOUNTER (INPATIENT)
Age: 83
LOS: 3 days | Discharge: HOME HEALTH | End: 2023-02-17
Attending: EMERGENCY MEDICINE | Admitting: INTERNAL MEDICINE
Payer: MEDICARE

## 2023-02-13 DIAGNOSIS — J18.9 PNEUMONIA OF BOTH LUNGS DUE TO INFECTIOUS ORGANISM, UNSPECIFIED PART OF LUNG: ICD-10-CM

## 2023-02-13 DIAGNOSIS — E86.0 DEHYDRATION: ICD-10-CM

## 2023-02-13 DIAGNOSIS — R09.02 HYPOXIA: ICD-10-CM

## 2023-02-13 DIAGNOSIS — R73.9 HYPERGLYCEMIA: ICD-10-CM

## 2023-02-13 DIAGNOSIS — U07.1 COVID-19: Primary | ICD-10-CM

## 2023-02-13 LAB
ALBUMIN SERPL-MCNC: 3.5 G/DL (ref 3.5–5.2)
ALP BLD-CCNC: 59 U/L (ref 35–104)
ALT SERPL-CCNC: 27 U/L (ref 0–32)
ANION GAP SERPL CALCULATED.3IONS-SCNC: 14 MMOL/L (ref 7–16)
AST SERPL-CCNC: 32 U/L (ref 0–31)
BASOPHILS ABSOLUTE: 0.02 E9/L (ref 0–0.2)
BASOPHILS RELATIVE PERCENT: 0.2 % (ref 0–2)
BETA-HYDROXYBUTYRATE: 0.55 MMOL/L (ref 0.02–0.27)
BILIRUB SERPL-MCNC: 0.4 MG/DL (ref 0–1.2)
BUN BLDV-MCNC: 49 MG/DL (ref 6–23)
CALCIUM SERPL-MCNC: 9.6 MG/DL (ref 8.6–10.2)
CHLORIDE BLD-SCNC: 93 MMOL/L (ref 98–107)
CO2: 23 MMOL/L (ref 22–29)
CREAT SERPL-MCNC: 1.4 MG/DL (ref 0.5–1)
EOSINOPHILS ABSOLUTE: 0.04 E9/L (ref 0.05–0.5)
EOSINOPHILS RELATIVE PERCENT: 0.4 % (ref 0–6)
GFR SERPL CREATININE-BSD FRML MDRD: 37 ML/MIN/1.73
GLUCOSE BLD-MCNC: 343 MG/DL (ref 74–99)
HCT VFR BLD CALC: 44 % (ref 34–48)
HEMOGLOBIN: 14.6 G/DL (ref 11.5–15.5)
IMMATURE GRANULOCYTES #: 0.11 E9/L
IMMATURE GRANULOCYTES %: 1.2 % (ref 0–5)
LACTIC ACID, SEPSIS: 1.8 MMOL/L (ref 0.5–1.9)
LIPASE: 199 U/L (ref 13–60)
LYMPHOCYTES ABSOLUTE: 1.17 E9/L (ref 1.5–4)
LYMPHOCYTES RELATIVE PERCENT: 12.9 % (ref 20–42)
MCH RBC QN AUTO: 27.8 PG (ref 26–35)
MCHC RBC AUTO-ENTMCNC: 33.2 % (ref 32–34.5)
MCV RBC AUTO: 83.8 FL (ref 80–99.9)
MONOCYTES ABSOLUTE: 0.82 E9/L (ref 0.1–0.95)
MONOCYTES RELATIVE PERCENT: 9.1 % (ref 2–12)
NEUTROPHILS ABSOLUTE: 6.89 E9/L (ref 1.8–7.3)
NEUTROPHILS RELATIVE PERCENT: 76.2 % (ref 43–80)
PDW BLD-RTO: 13.8 FL (ref 11.5–15)
PH VENOUS: 7.45 (ref 7.35–7.45)
PLATELET # BLD: 282 E9/L (ref 130–450)
PMV BLD AUTO: 11.2 FL (ref 7–12)
POTASSIUM SERPL-SCNC: 4.6 MMOL/L (ref 3.5–5)
PRO-BNP: 580 PG/ML (ref 0–450)
RBC # BLD: 5.25 E12/L (ref 3.5–5.5)
SARS-COV-2, NAAT: DETECTED
SODIUM BLD-SCNC: 130 MMOL/L (ref 132–146)
T4 TOTAL: 10.1 MCG/DL (ref 4.5–11.7)
TOTAL PROTEIN: 7.7 G/DL (ref 6.4–8.3)
TROPONIN, HIGH SENSITIVITY: 24 NG/L (ref 0–9)
TSH SERPL DL<=0.05 MIU/L-ACNC: 5.94 UIU/ML (ref 0.27–4.2)
WBC # BLD: 9.1 E9/L (ref 4.5–11.5)

## 2023-02-13 PROCEDURE — 96374 THER/PROPH/DIAG INJ IV PUSH: CPT

## 2023-02-13 PROCEDURE — 96361 HYDRATE IV INFUSION ADD-ON: CPT

## 2023-02-13 PROCEDURE — 84443 ASSAY THYROID STIM HORMONE: CPT

## 2023-02-13 PROCEDURE — 83690 ASSAY OF LIPASE: CPT

## 2023-02-13 PROCEDURE — 87077 CULTURE AEROBIC IDENTIFY: CPT

## 2023-02-13 PROCEDURE — 71045 X-RAY EXAM CHEST 1 VIEW: CPT

## 2023-02-13 PROCEDURE — 85025 COMPLETE CBC W/AUTO DIFF WBC: CPT

## 2023-02-13 PROCEDURE — 87150 DNA/RNA AMPLIFIED PROBE: CPT

## 2023-02-13 PROCEDURE — 82010 KETONE BODYS QUAN: CPT

## 2023-02-13 PROCEDURE — 87635 SARS-COV-2 COVID-19 AMP PRB: CPT

## 2023-02-13 PROCEDURE — 83605 ASSAY OF LACTIC ACID: CPT

## 2023-02-13 PROCEDURE — 84484 ASSAY OF TROPONIN QUANT: CPT

## 2023-02-13 PROCEDURE — 6370000000 HC RX 637 (ALT 250 FOR IP): Performed by: EMERGENCY MEDICINE

## 2023-02-13 PROCEDURE — 87186 SC STD MICRODIL/AGAR DIL: CPT

## 2023-02-13 PROCEDURE — 2580000003 HC RX 258: Performed by: EMERGENCY MEDICINE

## 2023-02-13 PROCEDURE — 87040 BLOOD CULTURE FOR BACTERIA: CPT

## 2023-02-13 PROCEDURE — 99285 EMERGENCY DEPT VISIT HI MDM: CPT

## 2023-02-13 PROCEDURE — 81001 URINALYSIS AUTO W/SCOPE: CPT

## 2023-02-13 PROCEDURE — 82800 BLOOD PH: CPT

## 2023-02-13 PROCEDURE — 83880 ASSAY OF NATRIURETIC PEPTIDE: CPT

## 2023-02-13 PROCEDURE — 84436 ASSAY OF TOTAL THYROXINE: CPT

## 2023-02-13 PROCEDURE — 93005 ELECTROCARDIOGRAM TRACING: CPT | Performed by: EMERGENCY MEDICINE

## 2023-02-13 PROCEDURE — 80053 COMPREHEN METABOLIC PANEL: CPT

## 2023-02-13 RX ORDER — 0.9 % SODIUM CHLORIDE 0.9 %
1000 INTRAVENOUS SOLUTION INTRAVENOUS ONCE
Status: COMPLETED | OUTPATIENT
Start: 2023-02-13 | End: 2023-02-13

## 2023-02-13 RX ORDER — 0.9 % SODIUM CHLORIDE 0.9 %
1000 INTRAVENOUS SOLUTION INTRAVENOUS ONCE
Status: COMPLETED | OUTPATIENT
Start: 2023-02-13 | End: 2023-02-14

## 2023-02-13 RX ADMIN — SODIUM CHLORIDE 1000 ML: 9 INJECTION, SOLUTION INTRAVENOUS at 23:44

## 2023-02-13 RX ADMIN — SODIUM CHLORIDE 1000 ML: 9 INJECTION, SOLUTION INTRAVENOUS at 22:02

## 2023-02-13 RX ADMIN — Medication 4 UNITS: at 23:44

## 2023-02-13 ASSESSMENT — ENCOUNTER SYMPTOMS
SHORTNESS OF BREATH: 0
VOMITING: 0
RHINORRHEA: 1
COUGH: 1
SINUS PRESSURE: 0
SORE THROAT: 0
BACK PAIN: 0
CHEST TIGHTNESS: 0
WHEEZING: 0
ABDOMINAL PAIN: 0
DIARRHEA: 0
NAUSEA: 0

## 2023-02-13 ASSESSMENT — PAIN - FUNCTIONAL ASSESSMENT: PAIN_FUNCTIONAL_ASSESSMENT: NONE - DENIES PAIN

## 2023-02-13 NOTE — TELEPHONE ENCOUNTER
Patient's daughter, Tor Nobles, called informing that patient is getting over COVID, but is not wanting to eat and is not drinking as much as she should. Tor Nobles stated she's concerned that patient is dehydrated and wants to know if Dr. Anders Knott would order a home health nurse to come in and give patient IV for hydration. I advised that patient should go to the ED. Tor Nobles was agreeable. Msg routed, for informational purposes.     Last seen 12/9/2022  Next appt 3/16/2023

## 2023-02-14 ENCOUNTER — APPOINTMENT (OUTPATIENT)
Dept: CT IMAGING | Age: 83
End: 2023-02-14
Payer: MEDICARE

## 2023-02-14 PROBLEM — U07.1 PNEUMONIA DUE TO COVID-19 VIRUS: Status: ACTIVE | Noted: 2023-02-14

## 2023-02-14 PROBLEM — N18.32 STAGE 3B CHRONIC KIDNEY DISEASE (HCC): Status: ACTIVE | Noted: 2022-05-05

## 2023-02-14 PROBLEM — J12.82 PNEUMONIA DUE TO COVID-19 VIRUS: Status: ACTIVE | Noted: 2023-02-14

## 2023-02-14 PROBLEM — N17.9 ACUTE KIDNEY INJURY (HCC): Status: ACTIVE | Noted: 2023-02-14

## 2023-02-14 LAB
BACTERIA: ABNORMAL /HPF
BILIRUBIN URINE: NEGATIVE
BLOOD, URINE: ABNORMAL
CLARITY: CLEAR
COLOR: YELLOW
EKG ATRIAL RATE: 80 BPM
EKG P AXIS: 46 DEGREES
EKG P-R INTERVAL: 186 MS
EKG Q-T INTERVAL: 398 MS
EKG QRS DURATION: 70 MS
EKG QTC CALCULATION (BAZETT): 459 MS
EKG R AXIS: 13 DEGREES
EKG T AXIS: 65 DEGREES
EKG VENTRICULAR RATE: 80 BPM
EPITHELIAL CELLS, UA: ABNORMAL /HPF
GLUCOSE URINE: 100 MG/DL
KETONES, URINE: NEGATIVE MG/DL
LACTIC ACID, SEPSIS: 2.3 MMOL/L (ref 0.5–1.9)
LACTIC ACID: 1.6 MMOL/L (ref 0.5–2.2)
LEUKOCYTE ESTERASE, URINE: NEGATIVE
METER GLUCOSE: 223 MG/DL (ref 74–99)
METER GLUCOSE: 255 MG/DL (ref 74–99)
METER GLUCOSE: 313 MG/DL (ref 74–99)
METER GLUCOSE: 331 MG/DL (ref 74–99)
NITRITE, URINE: NEGATIVE
PH UA: 5 (ref 5–9)
PROTEIN UA: 30 MG/DL
RBC UA: ABNORMAL /HPF (ref 0–2)
SPECIFIC GRAVITY UA: 1.02 (ref 1–1.03)
TROPONIN, HIGH SENSITIVITY: 21 NG/L (ref 0–9)
UROBILINOGEN, URINE: 0.2 E.U./DL
WBC UA: ABNORMAL /HPF (ref 0–5)

## 2023-02-14 PROCEDURE — 82962 GLUCOSE BLOOD TEST: CPT

## 2023-02-14 PROCEDURE — 2700000000 HC OXYGEN THERAPY PER DAY

## 2023-02-14 PROCEDURE — 96374 THER/PROPH/DIAG INJ IV PUSH: CPT

## 2023-02-14 PROCEDURE — 99223 1ST HOSP IP/OBS HIGH 75: CPT | Performed by: INTERNAL MEDICINE

## 2023-02-14 PROCEDURE — 2580000003 HC RX 258: Performed by: EMERGENCY MEDICINE

## 2023-02-14 PROCEDURE — 6370000000 HC RX 637 (ALT 250 FOR IP): Performed by: INTERNAL MEDICINE

## 2023-02-14 PROCEDURE — 93010 ELECTROCARDIOGRAM REPORT: CPT | Performed by: INTERNAL MEDICINE

## 2023-02-14 PROCEDURE — 6360000002 HC RX W HCPCS: Performed by: INTERNAL MEDICINE

## 2023-02-14 PROCEDURE — 6360000002 HC RX W HCPCS: Performed by: EMERGENCY MEDICINE

## 2023-02-14 PROCEDURE — 74176 CT ABD & PELVIS W/O CONTRAST: CPT

## 2023-02-14 PROCEDURE — 36415 COLL VENOUS BLD VENIPUNCTURE: CPT

## 2023-02-14 PROCEDURE — 2500000003 HC RX 250 WO HCPCS: Performed by: INTERNAL MEDICINE

## 2023-02-14 PROCEDURE — 2580000003 HC RX 258: Performed by: INTERNAL MEDICINE

## 2023-02-14 PROCEDURE — 83605 ASSAY OF LACTIC ACID: CPT

## 2023-02-14 PROCEDURE — 1200000000 HC SEMI PRIVATE

## 2023-02-14 PROCEDURE — 2060000000 HC ICU INTERMEDIATE R&B

## 2023-02-14 RX ORDER — AMLODIPINE BESYLATE 5 MG/1
5 TABLET ORAL DAILY
Status: DISCONTINUED | OUTPATIENT
Start: 2023-02-14 | End: 2023-02-17 | Stop reason: HOSPADM

## 2023-02-14 RX ORDER — ACETAMINOPHEN 650 MG/1
650 SUPPOSITORY RECTAL EVERY 6 HOURS PRN
Status: DISCONTINUED | OUTPATIENT
Start: 2023-02-14 | End: 2023-02-17 | Stop reason: HOSPADM

## 2023-02-14 RX ORDER — ASPIRIN 81 MG/1
81 TABLET ORAL DAILY
Status: DISCONTINUED | OUTPATIENT
Start: 2023-02-14 | End: 2023-02-17 | Stop reason: HOSPADM

## 2023-02-14 RX ORDER — SODIUM CHLORIDE 0.9 % (FLUSH) 0.9 %
10 SYRINGE (ML) INJECTION EVERY 12 HOURS SCHEDULED
Status: DISCONTINUED | OUTPATIENT
Start: 2023-02-14 | End: 2023-02-17 | Stop reason: HOSPADM

## 2023-02-14 RX ORDER — SODIUM CHLORIDE 9 MG/ML
INJECTION, SOLUTION INTRAVENOUS PRN
Status: DISCONTINUED | OUTPATIENT
Start: 2023-02-14 | End: 2023-02-17 | Stop reason: HOSPADM

## 2023-02-14 RX ORDER — DEXAMETHASONE SODIUM PHOSPHATE 10 MG/ML
10 INJECTION INTRAMUSCULAR; INTRAVENOUS ONCE
Status: DISCONTINUED | OUTPATIENT
Start: 2023-02-14 | End: 2023-02-14

## 2023-02-14 RX ORDER — ROSUVASTATIN CALCIUM 5 MG/1
5 TABLET, COATED ORAL DAILY
Status: DISCONTINUED | OUTPATIENT
Start: 2023-02-14 | End: 2023-02-17 | Stop reason: HOSPADM

## 2023-02-14 RX ORDER — ENOXAPARIN SODIUM 100 MG/ML
40 INJECTION SUBCUTANEOUS DAILY
Status: DISCONTINUED | OUTPATIENT
Start: 2023-02-14 | End: 2023-02-17 | Stop reason: HOSPADM

## 2023-02-14 RX ORDER — POLYETHYLENE GLYCOL 3350 17 G/17G
17 POWDER, FOR SOLUTION ORAL DAILY PRN
Status: DISCONTINUED | OUTPATIENT
Start: 2023-02-14 | End: 2023-02-17 | Stop reason: HOSPADM

## 2023-02-14 RX ORDER — ATENOLOL 50 MG/1
25 TABLET ORAL 2 TIMES DAILY
Status: DISCONTINUED | OUTPATIENT
Start: 2023-02-14 | End: 2023-02-17 | Stop reason: HOSPADM

## 2023-02-14 RX ORDER — INSULIN GLARGINE 100 [IU]/ML
10 INJECTION, SOLUTION SUBCUTANEOUS NIGHTLY
Status: DISCONTINUED | OUTPATIENT
Start: 2023-02-14 | End: 2023-02-15

## 2023-02-14 RX ORDER — DEXTROSE MONOHYDRATE 100 MG/ML
INJECTION, SOLUTION INTRAVENOUS CONTINUOUS PRN
Status: DISCONTINUED | OUTPATIENT
Start: 2023-02-14 | End: 2023-02-17 | Stop reason: HOSPADM

## 2023-02-14 RX ORDER — SODIUM CHLORIDE 9 MG/ML
INJECTION, SOLUTION INTRAVENOUS CONTINUOUS
Status: DISCONTINUED | OUTPATIENT
Start: 2023-02-14 | End: 2023-02-15

## 2023-02-14 RX ORDER — LEVOTHYROXINE SODIUM 88 UG/1
88 TABLET ORAL DAILY
Status: DISCONTINUED | OUTPATIENT
Start: 2023-02-14 | End: 2023-02-17 | Stop reason: HOSPADM

## 2023-02-14 RX ORDER — INSULIN LISPRO 100 [IU]/ML
0-4 INJECTION, SOLUTION INTRAVENOUS; SUBCUTANEOUS NIGHTLY
Status: DISCONTINUED | OUTPATIENT
Start: 2023-02-14 | End: 2023-02-15

## 2023-02-14 RX ORDER — INSULIN LISPRO 100 [IU]/ML
0-4 INJECTION, SOLUTION INTRAVENOUS; SUBCUTANEOUS
Status: DISCONTINUED | OUTPATIENT
Start: 2023-02-14 | End: 2023-02-15

## 2023-02-14 RX ORDER — DEXAMETHASONE 6 MG/1
6 TABLET ORAL DAILY
Status: DISCONTINUED | OUTPATIENT
Start: 2023-02-14 | End: 2023-02-17 | Stop reason: HOSPADM

## 2023-02-14 RX ORDER — DONEPEZIL HYDROCHLORIDE 5 MG/1
5 TABLET, FILM COATED ORAL NIGHTLY
Status: DISCONTINUED | OUTPATIENT
Start: 2023-02-14 | End: 2023-02-17 | Stop reason: HOSPADM

## 2023-02-14 RX ORDER — ONDANSETRON 2 MG/ML
4 INJECTION INTRAMUSCULAR; INTRAVENOUS EVERY 6 HOURS PRN
Status: DISCONTINUED | OUTPATIENT
Start: 2023-02-14 | End: 2023-02-17 | Stop reason: HOSPADM

## 2023-02-14 RX ORDER — ACETAMINOPHEN 325 MG/1
650 TABLET ORAL EVERY 6 HOURS PRN
Status: DISCONTINUED | OUTPATIENT
Start: 2023-02-14 | End: 2023-02-17 | Stop reason: HOSPADM

## 2023-02-14 RX ORDER — SODIUM CHLORIDE 0.9 % (FLUSH) 0.9 %
10 SYRINGE (ML) INJECTION PRN
Status: DISCONTINUED | OUTPATIENT
Start: 2023-02-14 | End: 2023-02-17 | Stop reason: HOSPADM

## 2023-02-14 RX ORDER — LEVOTHYROXINE SODIUM 88 UG/1
88 TABLET ORAL DAILY
Status: DISCONTINUED | OUTPATIENT
Start: 2023-02-14 | End: 2023-02-14

## 2023-02-14 RX ORDER — PROMETHAZINE HYDROCHLORIDE 25 MG/1
12.5 TABLET ORAL EVERY 6 HOURS PRN
Status: DISCONTINUED | OUTPATIENT
Start: 2023-02-14 | End: 2023-02-17 | Stop reason: HOSPADM

## 2023-02-14 RX ADMIN — INSULIN LISPRO 4 UNITS: 100 INJECTION, SOLUTION INTRAVENOUS; SUBCUTANEOUS at 20:39

## 2023-02-14 RX ADMIN — LEVOTHYROXINE SODIUM 88 MCG: 0.09 TABLET ORAL at 09:59

## 2023-02-14 RX ADMIN — ATENOLOL 25 MG: 50 TABLET ORAL at 10:00

## 2023-02-14 RX ADMIN — INSULIN GLARGINE 10 UNITS: 100 INJECTION, SOLUTION SUBCUTANEOUS at 20:38

## 2023-02-14 RX ADMIN — DONEPEZIL HYDROCHLORIDE 5 MG: 5 TABLET, FILM COATED ORAL at 20:31

## 2023-02-14 RX ADMIN — ASPIRIN 81 MG: 81 TABLET, COATED ORAL at 09:59

## 2023-02-14 RX ADMIN — AMLODIPINE BESYLATE 5 MG: 5 TABLET ORAL at 09:59

## 2023-02-14 RX ADMIN — SODIUM CHLORIDE: 9 INJECTION, SOLUTION INTRAVENOUS at 04:33

## 2023-02-14 RX ADMIN — INSULIN LISPRO 2 UNITS: 100 INJECTION, SOLUTION INTRAVENOUS; SUBCUTANEOUS at 13:57

## 2023-02-14 RX ADMIN — CEFTRIAXONE 2000 MG: 2 INJECTION, POWDER, FOR SOLUTION INTRAMUSCULAR; INTRAVENOUS at 04:29

## 2023-02-14 RX ADMIN — SODIUM CHLORIDE: 9 INJECTION, SOLUTION INTRAVENOUS at 16:41

## 2023-02-14 RX ADMIN — INSULIN LISPRO 1 UNITS: 100 INJECTION, SOLUTION INTRAVENOUS; SUBCUTANEOUS at 10:15

## 2023-02-14 RX ADMIN — DOXYCYCLINE 100 MG: 100 INJECTION, POWDER, LYOPHILIZED, FOR SOLUTION INTRAVENOUS at 18:56

## 2023-02-14 RX ADMIN — ATENOLOL 25 MG: 50 TABLET ORAL at 18:58

## 2023-02-14 RX ADMIN — Medication 10 ML: at 10:00

## 2023-02-14 RX ADMIN — DEXAMETHASONE 6 MG: 6 TABLET ORAL at 09:59

## 2023-02-14 RX ADMIN — Medication 10 ML: at 20:31

## 2023-02-14 RX ADMIN — INSULIN LISPRO 3 UNITS: 100 INJECTION, SOLUTION INTRAVENOUS; SUBCUTANEOUS at 18:58

## 2023-02-14 RX ADMIN — ENOXAPARIN SODIUM 40 MG: 100 INJECTION SUBCUTANEOUS at 10:00

## 2023-02-14 RX ADMIN — DOXYCYCLINE 100 MG: 100 INJECTION, POWDER, LYOPHILIZED, FOR SOLUTION INTRAVENOUS at 06:43

## 2023-02-14 RX ADMIN — ROSUVASTATIN CALCIUM 5 MG: 5 TABLET, FILM COATED ORAL at 13:56

## 2023-02-14 NOTE — CONSULTS
[address] [place] [address]  Phone [phone]   [phone]      Admit Date: 2023  9:38 PM  Pt Name: José Villegas  MRN: [id number]  : 1940  Reason for Consult:    Chief Complaint   Patient presents with    Other     States she is 10 days post COVID and has not been eating or drinking. Denies pain, N/V/D. Requesting Physician:  Karli Segundo DO  PCP: Marquita Bose MD  History Obtained From:  patient, chart   ID consulted for Dehydration [E86.0]  Hyperglycemia [R73.9]  Hypoxia [R09.02]  Pneumonia of both lungs due to infectious organism, unspecified part of lung [J18.9]  Pneumonia due to COVID-19 virus [U07.1, J12.82]  COVID-19 [U07.1]  on hospital day 0  CHIEF COMPLAINT       Chief Complaint   Patient presents with    Other     States she is 10 days post COVID and has not been eating or drinking. Denies pain, N/V/D.     HISTORYOF PRESENT ILLNESS   José Villegas is a 80 y.o. female who presents with   has a past medical history of Acquired hypothyroidism, Combined fat and carbohydrate induced hyperlipemia, Diabetes mellitus ([address] Essential hypertension, and Gout of multiple sites. ED TRIAGEVITALS  BP: 133/62, Temp: 98.3 °F (36.8 °C), Heart Rate: 71, Resp: 18, SpO2: 91 %  HPI:  ID was consulted on 23 for infection management  Pt presented to ER on 2023 with diagnosis of  Dehydration [E86.0]  Hyperglycemia [R73.9]  Hypoxia [R09.02]  Pneumonia of both lungs due to infectious organism, unspecified part of lung [J18.9]  Pneumonia due to COVID-19 virus [U07.1, J12.82]  COVID-19 [U07.1]  PT IN BED ON RA HAS  NO C/O F/C/NV/D  PT CAME IN DUE TO FATIGUE WEKANESS CONGESTION RHINORRHEA COUGH   COVID +  ON RA  AFEBRILE CR1.4 WBC9.1   BLOOD CX PENDING   CXRY Patchy ground-glass infiltrates seen within the lungs bilaterally suspicious   for multilobar pneumonia, with a possible small left effusion. CT 1. Fatty liver infiltration.    2. Patchy ground-glass parenchymal infiltrates are seen within the lung bases   bilaterally suspicious for multilobar pneumonia, such as viral pneumonia. Could consider correlation with COVID testing given appearance. 3. Diffuse atherosclerosis including coronary artery involvement. 4. Other nonacute findings within the abdomen and pelvis as above. Currently on     [START ON 2/15/2023] cefTRIAXone (ROCEPHIN) 1,000 mg in sterile water 10 mL IV syringe, Q24H  doxycycline (VIBRAMYCIN) 100 mg in sodium chloride 0.9 % 100 mL IVPB ([id number]), Q12H  dexamethasone (DECADRON) tablet 6 mg, Daily         REVIEW OF SYSTEMS     CONSTITUTIONAL:   No fever, chills, weight loss  ALLERGIES:    No rash or itch  EYES:     No visual changes  ENT:      No  hearing loss or sore throat  CARDIOVASCULAR:   No chest pain or palpitations  RESPIRATORY:     Cough ,NO sob  ENDOCRINE:    No increase thirst, urination   HEME-LYMPH:   No easy bruising or bleeding disorder  GI:     No nausea, vomiting or diarrhea  :     No urinary complaints  NEURO:    No seizures, stroke, HA  MUSCULOSKELETAL:  No muscle aches or pain, no joint pain  SKIN:     No rash, ulcers or wounds  PSYCH:    No depression or anxiety    Medications Prior to Admission: levothyroxine (SYNTHROID) 88 MCG tablet, Take 1 tablet by mouth Daily  atenolol (TENORMIN) 25 MG tablet, Take 1 tablet by mouth in the morning and 1 tablet in the evening. donepezil (ARICEPT) 5 MG tablet, Take 1 tablet by mouth nightly  metFORMIN (GLUCOPHAGE) 500 MG tablet, Take 1 tablet by mouth 2 times daily (with meals)  SYNTHROID 88 MCG tablet, Take 1 tablet by mouth in the morning. amLODIPine (NORVASC) 5 MG tablet, Take 1 tablet by mouth in the morning.   Blood Glucose Calibration (QUICKTEK CONTROL SOLUTION) LIQD, 1 each by In Vitro route daily Control solution for patient glucose meter to use daily  BLOOD GLUCOSE MONITOR, 1 each Use once daily  blood glucose monitor strips, 1 strip by Other route daily Test 2 times a day & as needed for symptoms of irregular blood glucose. Dispense sufficient amount for indicated testing frequency plus additional to accommodate PRN testing needs. E11.9  Lancets Thin MISC, 1 each by Does not apply route daily E11.9  blood glucose monitor kit and supplies, Dispense sufficient amount for indicated testing frequency plus additional to accommodate PRN testing needs. Dispense all needed supplies to include: monitor, strips, lancing device, lancets, control solutions, alcohol swabs.   diclofenac sodium (VOLTAREN) 1 % GEL, Apply 4 g topically 4 times daily  rosuvastatin (CRESTOR) 5 MG tablet, TAKE ONE TABLET BY MOUTH EVERY DAY  glipiZIDE (GLUCOTROL) 5 MG tablet, Take 1 tablet by mouth daily  diclofenac sodium (VOLTAREN) 1 % GEL, Apply 4 g topically 4 times daily as needed (pain)  aspirin 81 MG EC tablet, Take by mouth  CURRENT MEDICATIONS     Current Facility-Administered Medications:     sodium chloride flush 0.9 % injection 10 mL, 10 mL, IntraVENous, 2 times per day, Althea Lazo MD, 10 mL at 02/14/23 1000    sodium chloride flush 0.9 % injection 10 mL, 10 mL, IntraVENous, PRN, Althea Lazo MD    0.9 % sodium chloride infusion, , IntraVENous, PRN, Althea Lazo MD    enoxaparin (LOVENOX) injection 40 mg, 40 mg, SubCUTAneous, Daily, Althea Lazo MD, 40 mg at 02/14/23 1000    promethazine (PHENERGAN) tablet 12.5 mg, 12.5 mg, Oral, Q6H PRN **OR** ondansetron (ZOFRAN) injection 4 mg, 4 mg, IntraVENous, Q6H PRN, Althea Lazo MD    polyethylene glycol (GLYCOLAX) packet 17 g, 17 g, Oral, Daily PRN, Althea Lazo MD    acetaminophen (TYLENOL) tablet 650 mg, 650 mg, Oral, Q6H PRN **OR** acetaminophen (TYLENOL) suppository 650 mg, 650 mg, Rectal, Q6H PRN, Althea Lazo MD    0.9 % sodium chloride infusion, , IntraVENous, Continuous, Althea Lazo MD, Last Rate: 100 mL/hr at 02/14/23 0637, Rate Verify at 02/14/23 0637    [START ON 2/15/2023] cefTRIAXone (ROCEPHIN) 1,000 mg in sterile water 10 mL IV syringe, 1,000 mg, IntraVENous, Q24H, Reinaldo Erazo MD    doxycycline (VIBRAMYCIN) 100 mg in sodium chloride 0.9 % 100 mL IVPB ([id number]), 100 mg, IntraVENous, Q12H, Reinaldo Erazo MD, Stopped at 02/14/23 0743    dexamethasone (DECADRON) tablet 6 mg, 6 mg, Oral, Daily, Reinaldo Erazo MD, 6 mg at 02/14/23 1174    aspirin EC tablet 81 mg, 81 mg, Oral, Daily, Reinaldo Erazo MD, 81 mg at 02/14/23 0959    rosuvastatin (CRESTOR) tablet 5 mg, 5 mg, Oral, Daily, Reinaldo Erazo MD    amLODIPine (NORVASC) tablet 5 mg, 5 mg, Oral, Daily, Reinaldo Erazo MD, 5 mg at 02/14/23 7565    levothyroxine (SYNTHROID) tablet 88 mcg, 88 mcg, Oral, Daily, Reinaldo Erazo MD, 88 mcg at 02/14/23 0959    atenolol (TENORMIN) tablet 25 mg, 25 mg, Oral, BID, Reinaldo Erazo MD, 25 mg at 02/14/23 1000    donepezil (ARICEPT) tablet 5 mg, 5 mg, Oral, Nightly, Reinaldo Erazo MD    insulin lispro (HUMALOG) injection vial 0-4 Units, 0-4 Units, SubCUTAneous, TID WC, Chacorta Doss MD    insulin lispro (HUMALOG) injection vial 0-4 Units, 0-4 Units, SubCUTAneous, Nightly, Chacorta Doss MD    glucose chewable tablet 16 g, 4 tablet, Oral, PRN, Reinaldo Erazo MD    dextrose bolus 10% 125 mL, 125 mL, IntraVENous, PRN **OR** dextrose bolus 10% 250 mL, 250 mL, IntraVENous, PRN, Reinaldo Erazo MD    glucagon (rDNA) injection 1 mg, 1 mg, SubCUTAneous, PRN, Reinaldo Erazo MD    dextrose 10 % infusion, , IntraVENous, Continuous PRN, Reinaldo Erazo MD  ALLERGIES     Patient has no known allergies.   Immunization History   Administered Date(s) Administered    COVID-19, MODERNA BLUE border, Primary or Immunocompromised, (age 12y+), IM, 100 mcg/0.5mL 02/23/2021, 03/23/2021      Internal Administration   First Dose COVID-19, MODERNA BLUE border, Primary or Immunocompromised, (age 12y+), IM, 100 mcg/0.5mL  02/23/2021   Second Dose COVID-19, MODERNA BLUE border, Primary or Immunocompromised, (age 12y+), IM, 100 mcg/0.5mL   03/23/2021 Last COVID Lab SARS-CoV-2, NAAT (no units)   Date Value   02/13/2023 DETECTED (A)          PAST MEDICAL HISTORY     Past Medical History:   Diagnosis Date    Acquired hypothyroidism 9/18/2017    Combined fat and carbohydrate induced hyperlipemia 10/22/2007    Diabetes mellitus ([facility] [address]     Essential hypertension 9/18/2017    Gout of multiple sites 9/18/2017     SURGICAL HISTORY       Past Surgical History:   Procedure Laterality Date    BACK SURGERY      HIP SURGERY Right     HYSTERECTOMY, TOTAL ABDOMINAL (CERVIX REMOVED)       FAMILY HISTORY     No family history on file.   SOCIAL HISTORY       Social History     Socioeconomic History    Marital status:    Tobacco Use    Smoking status: Never    Smokeless tobacco: Never   Substance and Sexual Activity    Alcohol use: No    Drug use: No     Social Determinants of Health     Financial Resource Strain: Low Risk     Difficulty of Paying Living Expenses: Not hard at all   Food Insecurity: No Food Insecurity    Worried About Running Out of Food in the Last Year: Never true    Ran Out of Food in the Last Year: Never true   Physical Activity: Inactive    Days of Exercise per Week: 0 days    Minutes of Exercise per Session: 0 min     PHYSICAL EXAM        Vitals:    Vitals:    02/14/23 0630 02/14/23 0700 02/14/23 0710 02/14/23 0801   BP:   (!) 146/76 133/62   Pulse: 72 72 72 71   Resp: 19 22 21 18   Temp:    98.3 °F (36.8 °C)   TempSrc:    Oral   SpO2: 91% 93% 96% 91%   Weight:       Height:            CONSTITUTIONAL:  awake, alert, cooperative, no apparent distress, and appears stated age  ENT:  Normocephalic, without obvious abnormality, atraumatic, sinuses nontender on palpation, external ears without lesions, oral pharynx with moist mucus membranes, tonsils without erythema or exudates,   NECK:  Supple, symmetrical, trachea midline, no adenopathy, thyroid symmetric, not enlarged and no tenderness, skin normal  LUNGS:  No increased work of breathing, good air exchange, clear to auscultation bilaterally, no crackles or wheezing  CARDIOVASCULAR:  Normal apical impulse, regular rate and rhythm, normal S1 and S2, no S3 or S4, and no murmur noted  ABDOMEN:  No scars, normal bowel sounds, soft, non-distended, non-tender, no masses palpated   CHEST/BREASTS:  Breasts symmetrical    MUSCULOSKELETAL:  There is no redness, warmth, or swelling of the joints. Full range of motion noted. NEUROLOGIC:  Awake, alert, oriented to name, place and time. Cranial nerves II-XII are grossly intact. SKIN:  no bruising or bleeding and normal skin color, texture, turgor        Peripheral Intravenous Line:  Peripheral IV 02/14/23 Left Antecubital (Active)   Site Assessment Clean, dry & intact 02/14/23 0256   Line Status Brisk blood return;Capped;Flushed; Intermittent infusions; Normal saline locked;Specimen collected; Tubing changed 02/14/23 0256   Line Care Cap changed;Line pulled back; Connections checked and tightened;Ports disinfected; Tubing changed; Chlorhexidine wipes 02/14/23 0256   Phlebitis Assessment No symptoms 02/14/23 0256   Infiltration Assessment 0 02/14/23 0256   Alcohol Cap Used Yes 02/14/23 0256   Dressing Status New dressing applied;Clean, dry & intact 02/14/23 0256   Dressing Type Transparent 02/14/23 0256   Dressing Intervention New 02/14/23 0256           DIAGNOSTIC RESULTS   RADIOLOGY:   CT ABDOMEN PELVIS WO CONTRAST Additional Contrast? None    Result Date: 2/14/2023  EXAMINATION: CT OF THE ABDOMEN AND PELVIS WITHOUT CONTRAST 2/14/2023 1:25 am TECHNIQUE: CT of the abdomen and pelvis was performed without the administration of intravenous contrast. Multiplanar reformatted images are provided for review. Automated exposure control, iterative reconstruction, and/or weight based adjustment of the mA/kV was utilized to reduce the radiation dose to as low as reasonably achievable. COMPARISON: None.  HISTORY: ORDERING SYSTEM PROVIDED HISTORY: elevated lipase TECHNOLOGIST PROVIDED HISTORY: Additional Contrast?->None Reason for exam:->elevated lipase Decision Support Exception - unselect if not a suspected or confirmed emergency medical condition->Emergency Medical Condition (MA) FINDINGS: Lower Chest: No cardiomegaly. Coronary artery atherosclerosis. Aortic valvular calcifications are identified. No distal esophageal thickening is seen. Multilobar patchy ground-glass infiltrates are seen within the lungs bilaterally. Mitral annular calcifications are noted. Organs: Fatty liver infiltration. No adrenal mass is identified. No pancreatic mass. No peripancreatic inflammatory process. Low-attenuation benign-appearing cysts noted within the right kidney. No right-sided or left-sided renal calculi. No hydroureteronephrosis is seen. GI/Bowel: No acute inflammatory bowel process, ileus or obstruction. Colonic diverticulosis is noted. Pelvis: No pelvic mass. The bladder is unremarkable. No free fluid in the pelvis. No pelvic lymphadenopathy. Hysterectomy. Peritoneum/Retroperitoneum: Aorto iliac atherosclerosis. No retroperitoneal or mesenteric lymphadenopathy. No bowel herniation is seen. Bones/Soft Tissues: Postoperative changes seen related to right hip arthroplasty. Degenerative changes at the pubic symphysis, left hip joint, SI joint bilaterally as well as the spine. Greatest disc disease noted at the lumbosacral junction. Moderate multilevel spinal canal stenosis. Foraminal narrowing seen within the lumbar spine as well, greatest at L4-L5. 1. Fatty liver infiltration. 2. Patchy ground-glass parenchymal infiltrates are seen within the lung bases bilaterally suspicious for multilobar pneumonia, such as viral pneumonia. Could consider correlation with COVID testing given appearance. 3. Diffuse atherosclerosis including coronary artery involvement. 4. Other nonacute findings within the abdomen and pelvis as above.      XR CHEST PORTABLE    Result Date: 2/13/2023  EXAMINATION: ONE XRAY VIEW OF THE CHEST 2/13/2023 9:52 pm COMPARISON: None. HISTORY: ORDERING SYSTEM PROVIDED HISTORY: Cough, COVID TECHNOLOGIST PROVIDED HISTORY: Reason for exam:->Cough, COVID FINDINGS: Cardiac size is enlarged. Patchy ground-glass infiltrates are seen throughout the lungs bilaterally, greatest in the right mid lung and both lung bases. Small left-sided pleural effusion suspected. No pneumothorax. Degenerative changes in the shoulders and spine. No acute osseous fracture is identified. Aortic atherosclerosis. Patchy ground-glass infiltrates seen within the lungs bilaterally suspicious for multilobar pneumonia, with a possible small left effusion. LABS  Recent Labs     02/13/23  2146   WBC 9.1   HGB 14.6   HCT 44.0   MCV 83.8        Recent Labs     02/13/23 2146   *   K 4.6   CL 93*   CO2 23   BUN 49*   CREATININE 1.4*   LABGLOM 37   GLUCOSE 343*   PROT 7.7   LABALBU 3.5   CALCIUM 9.6   BILITOT 0.4   ALKPHOS 59   AST 32*   ALT 27      Lab Results   Component Value Date/Time    COVID19 DETECTED 02/13/2023 09:46 PM        Lab Results   Component Value Date/Time    COVID19 DETECTED 02/13/2023 09:46 PM     COVID-19/RACHEL-COV2 LABS  Recent Labs     02/13/23  2146   AST 32*   ALT 27     Lab Results   Component Value Date/Time    CHOL 187 11/07/2022 09:32 AM    TRIG 276 11/07/2022 09:32 AM    HDL 35 11/07/2022 09:32 AM    LDLCALC 97 11/07/2022 09:32 AM    LABVLDL 55 11/07/2022 09:32 AM          MICROBIOLOGY:          FINAL IMPRESSION    Patient is a 80 y.o. female who presented with   Chief Complaint   Patient presents with    Other     States she is 10 days post COVID and has not been eating or drinking.  Denies pain, N/V/D.    and admitted for Dehydration [E86.0]  Hyperglycemia [R73.9]  Hypoxia [R09.02]  Pneumonia of both lungs due to infectious organism, unspecified part of lung [J18.9]  Pneumonia due to COVID-19 virus [U07.1, J12.82]  COVID-19 [U07.1]    ON RA   CHECK BIOMARKERS CHECK PROCAL   FOLLOW RSP STATUS       [START ON 2/15/2023] cefTRIAXone (ROCEPHIN) 1,000 mg in sterile water 10 mL IV syringe, Q24H  doxycycline (VIBRAMYCIN) 100 mg in sodium chloride 0.9 % 100 mL IVPB ([id number]), Q12H  dexamethasone (DECADRON) tablet 6 mg, Daily         Available labs, imaging studies, microbiologic studies have been reviewed. The patient/FAMILY  was educated about the diagnosis, prognosis, indications, risks and benefits of treatment. An opportunity to ask questions was given to the patient/FAMILY and questions were answered. Thank you for involving me in the care of [facility]. Please do not hesitate to call [phone]  for any questions or concerns.          Electronically signed by Aissatou Abdul MD on 2/14/2023 at 11:28 AM

## 2023-02-14 NOTE — PROGRESS NOTES
[address] [place], [place] [address]ist   Progress Note    Admitting Date and Time: 2/13/2023  9:38 PM  Admit Dx: Dehydration [E86.0]  Hyperglycemia [R73.9]  Hypoxia [R09.02]  Pneumonia of both lungs due to infectious organism, unspecified part of lung [J18.9]  Pneumonia due to COVID-19 virus [U07.1, J12.82]  COVID-19 [U07.1]    Subjective/interval history:    Pt admitted with COVID-19 pneumonia and possible mild acute kidney injury on chronic kidney disease. States she has felt mildly ill, but family was concerned and felt it was best to go to the ED. Labs significant for creatinine of 1.4 with most recent available 1.3 in November 2022 noted during PCP office visit. Chest x-ray in the ED consistent with viral pneumonia, possible small left pleural effusion. CT of the abdomen pelvis was done for elevated lipase which did not show any acute abdominal process, and views of the left lower lung did not show pleural effusion but rather multifocal infiltrates consistent with COVID-pneumonia. Today she feels fatigued, but otherwise denies specific complaints. She does have a mild dry cough during encounter.     ROS: denies fever, chills, cp, sob, n/v, HA unless stated above.     sodium chloride flush  10 mL IntraVENous 2 times per day    enoxaparin  40 mg SubCUTAneous Daily    [START ON 2/15/2023] cefTRIAXone (ROCEPHIN) IV  1,000 mg IntraVENous Q24H    doxycycline (VIBRAMYCIN) IV  100 mg IntraVENous Q12H    dexamethasone  6 mg Oral Daily    aspirin  81 mg Oral Daily    rosuvastatin  5 mg Oral Daily    amLODIPine  5 mg Oral Daily    levothyroxine  88 mcg Oral Daily    atenolol  25 mg Oral BID    donepezil  5 mg Oral Nightly    insulin lispro  0-4 Units SubCUTAneous TID WC    insulin lispro  0-4 Units SubCUTAneous Nightly    insulin glargine  10 Units SubCUTAneous Nightly     sodium chloride flush, 10 mL, PRN  sodium chloride, , PRN  promethazine, 12.5 mg, Q6H PRN   Or  ondansetron, 4 mg, Q6H PRN  polyethylene glycol, 17 g, Daily PRN  acetaminophen, 650 mg, Q6H PRN   Or  acetaminophen, 650 mg, Q6H PRN  glucose, 4 tablet, PRN  dextrose bolus, 125 mL, PRN   Or  dextrose bolus, 250 mL, PRN  glucagon (rDNA), 1 mg, PRN  dextrose, , Continuous PRN       Objective:    /62   Pulse 71   Temp 98.3 °F (36.8 °C) (Oral)   Resp 18   Ht 5' 2\" (1.575 m)   Wt 180 lb (81.6 kg)   SpO2 91%   BMI 32.92 kg/m²   General Appearance: alert and oriented to person, place and time and in no acute distress  Skin: warm and dry  Head: normocephalic and atraumatic  Eyes: pupils equal, round, and reactive to light, extraocular eye movements intact, conjunctivae normal  ENT: Oral mucosa dry. Neck: neck supple and non tender without mass   Pulmonary/Chest: Nonlabored on room air. Coarse breath sounds bilaterally  Cardiovascular: normal rate, normal S1 and S2 and no carotid bruits  Abdomen: soft, non-tender, non-distended, normal bowel sounds, no masses or organomegaly  Extremities: no cyanosis, no clubbing and no edema  Neurologic: no cranial nerve deficit and speech normal      Recent Labs     02/13/23 2146   *   K 4.6   CL 93*   CO2 23   BUN 49*   CREATININE 1.4*   GLUCOSE 343*   CALCIUM 9.6       Recent Labs     02/13/23 2146   ALKPHOS 59   PROT 7.7   LABALBU 3.5   BILITOT 0.4   AST 32*   ALT 27       Recent Labs     02/13/23 2146   WBC 9.1   RBC 5.25   HGB 14.6   HCT 44.0   MCV 83.8   MCH 27.8   MCHC 33.2   RDW 13.8      MPV 11.2         Radiology:   CT ABDOMEN PELVIS WO CONTRAST Additional Contrast? None   Final Result   1. Fatty liver infiltration. 2. Patchy ground-glass parenchymal infiltrates are seen within the lung bases   bilaterally suspicious for multilobar pneumonia, such as viral pneumonia. Could consider correlation with COVID testing given appearance. 3. Diffuse atherosclerosis including coronary artery involvement. 4. Other nonacute findings within the abdomen and pelvis as above.          XR CHEST PORTABLE   Final Result   Patchy ground-glass infiltrates seen within the lungs bilaterally suspicious   for multilobar pneumonia, with a possible small left effusion. Assessment and Plan:  Principal Problem:    Pneumonia due to COVID-19 virus  Active Problems:    Stage 3b chronic kidney disease ([facility] [address]    Type 2 diabetes mellitus with chronic kidney disease    Acute kidney injury ([facility] [address]    Essential hypertension    Acquired hypothyroidism  Resolved Problems:    * No resolved hospital problems. *      COVID-19 pneumonia  -Continue dexamethasone 6 mg p.o. daily  -IV ceftriaxone and doxycycline for possible superimposed bacterial pneumonia  -ID following    2. Acute kidney injury superimposed on stage IIIb chronic kidney disease  -Received 2 L IV fluid boluses in the ED  -Continue gentle IV fluids at 50 mL/h of normal saline through this afternoon then stop, repeat BMP tomorrow    3. Type 2 diabetes mellitus  -Started on corrective scale insulin  -Add Lantus 10 units nightly as blood glucose currently above inpatient goal of 1[birth year]21/1980  -Check hemoglobin A1c    4. Hypothyroidism  -Continue levothyroxine    5. Primary hypertension  -Continue atenolol and amlodipine    6. Dyslipidemia  -Continue rosuvastatin    7. Alzheimer dementia  -Continue donepezil    DVT prophylaxis: Enoxaparin  CODE STATUS: Full code    Disposition: Anticipate 1 to 2 days in the hospital prior to discharge. We will request PT/OT evaluations for discharge planning. NOTE: This report was transcribed using voice recognition software. Every effort was made to ensure accuracy; however, inadvertent computerized transcription errors may be present.      Electronically signed by Margarita Rodriguez DO on 2/14/2023 at 3:53 PM

## 2023-02-14 NOTE — ED PROVIDER NOTES
Chief complaint:  Fatigue    HPI history provided by report  Patient presents here from home states she is really not sure why she is here, she feels weak and fatigued, has COVID. Apparently from reports she has not been eating or drinking well and overall fatigued. She is awake and alert and oriented times person and place and year but misses the month. States that she has no headache and no confusion. Denies stiff neck. Denies chest pain, shortness of breath or abdominal pain and denies nausea or vomiting. Denies any specific weakness anywhere and denies difficulty speaking. Review of Systems   Constitutional:  Positive for activity change, appetite change and fatigue. Negative for chills, diaphoresis and fever. HENT:  Positive for congestion and rhinorrhea. Negative for sinus pressure and sore throat. Respiratory:  Positive for cough. Negative for chest tightness, shortness of breath and wheezing. Cardiovascular:  Negative for chest pain, palpitations and leg swelling. Gastrointestinal:  Negative for abdominal pain, diarrhea, nausea and vomiting. Genitourinary:  Negative for dysuria, flank pain and frequency. Musculoskeletal:  Negative for arthralgias, back pain, gait problem, joint swelling, myalgias, neck pain and neck stiffness. Skin:  Negative for rash and wound. Neurological:  Negative for dizziness, seizures, syncope, weakness, light-headedness, numbness and headaches. All other systems reviewed and are negative. Physical Exam  Vitals and nursing note reviewed. Constitutional:       General: She is awake. She is not in acute distress. Appearance: She is well-developed. She is not ill-appearing, toxic-appearing or diaphoretic. HENT:      Head: Normocephalic and atraumatic. Nose: Mucosal edema and congestion present. No rhinorrhea. Mouth/Throat:      Pharynx: Oropharynx is clear. Uvula midline.  No pharyngeal swelling, oropharyngeal exudate, posterior oropharyngeal erythema or uvula swelling. Eyes:      Pupils: Pupils are equal, round, and reactive to light. Cardiovascular:      Rate and Rhythm: Normal rate and regular rhythm. Heart sounds: Normal heart sounds. No murmur heard. Pulmonary:      Effort: Pulmonary effort is normal. No respiratory distress. Breath sounds: Normal breath sounds. No stridor, decreased air movement or transmitted upper airway sounds. No decreased breath sounds, wheezing, rhonchi or rales. Chest:      Chest wall: No tenderness. Abdominal:      General: Bowel sounds are normal. There is no distension. Palpations: Abdomen is soft. Tenderness: There is no abdominal tenderness. There is no right CVA tenderness, left CVA tenderness, guarding or rebound. Musculoskeletal:         General: No swelling, tenderness, deformity or signs of injury. Cervical back: Full passive range of motion without pain, normal range of motion and neck supple. No signs of trauma or rigidity. No spinous process tenderness or muscular tenderness. Normal range of motion. Right lower leg: No edema. Left lower leg: No edema. Comments: Arms and legs are all neurovascular intact and well perfused with no pretibial edema or calf pain. Skin:     General: Skin is warm and dry. Coloration: Skin is not cyanotic, jaundiced, mottled or pale. Findings: No bruising, erythema or rash. Neurological:      General: No focal deficit present. Mental Status: She is alert and oriented to person, place, and time. GCS: GCS eye subscore is 4. GCS verbal subscore is 5. GCS motor subscore is 6. Cranial Nerves: Cranial nerves 2-12 are intact. No cranial nerve deficit. Sensory: Sensation is intact. Motor: Motor function is intact. No atrophy. Coordination: Coordination is intact. Coordination normal.   Psychiatric:         Behavior: Behavior is cooperative.         Procedures     MDM     History provided by: The patient  Social factors affecting care: None  Chronic conditions affecting care: Hypertension, diabetes  Chart reviewed: None    Differential includes but not limited to: COVID, dehydration, renal failure, electrolyte abnormalities, pneumonia, MI, CHF, pancreatitis, DKA    Work up includes with interpretations: Lactic acid x3 shows 1.8, 2.3 and then 1.6 fluctuating levels. Troponin of 24 and 21, CBC shows white count normal at 9.1 with hemoglobin 14.6. CMP shows normal LFTs however BUN slightly increasing at 49 with a minimal change in creatinine 1.4 reflective of mild dehydration. Blood sugar somewhat elevated at 343 although anion gap 14 and CO2 23 not likely reflecting DKA. proBNP 580 with no gross CHF on CT on clinical exam.  Urinalysis with no UTI. Lipase elevated at 199 but no epigastric pain on exam and CT does not reflect pancreatitis. Beta hydroxybutyrate 0.55 with a venous pH of 7.45 again not suggestive of DKA. COVID test is positive. CT abdomen pelvis read by radiology shows groundglass parenchymal infiltrates consistent with her viral COVID. Fatty infiltrate of the liver with atherosclerosis but no other acute abnormalities of the abdomen and pelvis. Chest x-ray also read by radiology shows patchy groundglass infiltrates suspicious for pneumonia    Advanced directive discussion: None    Treatment in ER: IV fluids, IV Rocephin. Consultations in ER: Internal medicine    Diagnosis and disposition: COVID, pneumonia, hypoxia, hyperglycemia, dehydration, patient will benefit from admission, fluctuating with hypoxia at rest in the ER      ED Course as of 02/14/23 0342 Tue Feb 14, 2023   0340 Case discussed with Dr. Roselia Scott, detailed overview given, they will admit the patient.  [NC]      ED Course User Index  [NC] Neha Nunez DO          EKG Interpretation    Interpreted by emergency department physician    Rhythm: normal sinus   Rate: 80  Axis: normal  Ectopy: premature ventricular contractions (unifocal)  Conduction: normal  ST Segments: no acute change  T Waves: no acute change  Q Waves: none    Clinical Impression: no acute changes    Cr SyedDO silvio    ED Course as of 02/14/23 0342 Tue Feb 14, 2023 0340 Case discussed with Dr. Cleo Johnson, detailed overview given, they will admit the patient. [NC]      ED Course User Index  [NC] Leilani Elizabeth DO Mendez       --------------------------------------------- PAST HISTORY ---------------------------------------------  Past Medical History:  has a past medical history of Acquired hypothyroidism, Combined fat and carbohydrate induced hyperlipemia, Diabetes mellitus ([address] Essential hypertension, and Gout of multiple sites. Past Surgical History:  has a past surgical history that includes Hysterectomy, total abdominal; back surgery; and hip surgery (Right). Social History:  reports that she has never smoked. She has never used smokeless tobacco. She reports that she does not drink alcohol and does not use drugs. Family History: family history is not on file. The patients home medications have been reviewed. Allergies: Patient has no known allergies.     -------------------------------------------------- RESULTS -------------------------------------------------    LABS:  Results for orders placed or performed during the hospital encounter of 02/13/23   COVID-19, Rapid    Specimen: Nasopharyngeal Swab   Result Value Ref Range    SARS-CoV-2, NAAT DETECTED (A) Not Detected   Lactate, Sepsis   Result Value Ref Range    Lactic Acid, Sepsis 1.8 0.5 - 1.9 mmol/L   Lactate, Sepsis   Result Value Ref Range    Lactic Acid, Sepsis 2.3 (H) 0.5 - 1.9 mmol/L   CBC with Auto Differential   Result Value Ref Range    WBC 9.1 4.5 - 11.5 E9/L    RBC 5.25 3.50 - 5.50 E12/L    Hemoglobin 14.6 11.5 - 15.5 g/dL    Hematocrit 44.0 34.0 - 48.0 %    MCV 83.8 80.0 - 99.9 fL    MCH 27.8 26.0 - 35.0 pg    MCHC 33.2 32.0 - 34.5 %    RDW 13.8 11.5 - 15.0 fL    Platelets 255 739 - 977 E9/L    MPV 11.2 7.0 - 12.0 fL    Neutrophils % 76.2 43.0 - 80.0 %    Immature Granulocytes % 1.2 0.0 - 5.0 %    Lymphocytes % 12.9 (L) 20.0 - 42.0 %    Monocytes % 9.1 2.0 - 12.0 %    Eosinophils % 0.4 0.0 - 6.0 %    Basophils % 0.2 0.0 - 2.0 %    Neutrophils Absolute 6.89 1.80 - 7.30 E9/L    Immature Granulocytes # 0.11 E9/L    Lymphocytes Absolute 1.17 (L) 1.50 - 4.00 E9/L    Monocytes Absolute 0.82 0.10 - 0.95 E9/L    Eosinophils Absolute 0.04 (L) 0.05 - 0.50 E9/L    Basophils Absolute 0.02 0.00 - 0.20 E9/L   Comprehensive Metabolic Panel   Result Value Ref Range    Sodium 130 (L) 132 - 146 mmol/L    Potassium 4.6 3.5 - 5.0 mmol/L    Chloride 93 (L) 98 - 107 mmol/L    CO2 23 22 - 29 mmol/L    Anion Gap 14 7 - 16 mmol/L    Glucose 343 (H) 74 - 99 mg/dL    BUN 49 (H) 6 - 23 mg/dL    Creatinine 1.4 (H) 0.5 - 1.0 mg/dL    Est, Glom Filt Rate 37 >=60 mL/min/1.73    Calcium 9.6 8.6 - 10.2 mg/dL    Total Protein 7.7 6.4 - 8.3 g/dL    Albumin 3.5 3.5 - 5.2 g/dL    Total Bilirubin 0.4 0.0 - 1.2 mg/dL    Alkaline Phosphatase 59 35 - 104 U/L    ALT 27 0 - 32 U/L    AST 32 (H) 0 - 31 U/L   Brain Natriuretic Peptide   Result Value Ref Range    Pro- (H) 0 - 450 pg/mL   Troponin   Result Value Ref Range    Troponin, High Sensitivity 24 (H) 0 - 9 ng/L   Urinalysis   Result Value Ref Range    Color, UA Yellow Straw/Yellow    Clarity, UA Clear Clear    Glucose, Ur 100 (A) Negative mg/dL    Bilirubin Urine Negative Negative    Ketones, Urine Negative Negative mg/dL    Specific Gravity, UA 1.025 1.005 - 1.030    Blood, Urine TRACE (A) Negative    pH, UA 5.0 5.0 - 9.0    Protein, UA 30 (A) Negative mg/dL    Urobilinogen, Urine 0.2 <2.0 E.U./dL    Nitrite, Urine Negative Negative    Leukocyte Esterase, Urine Negative Negative   Lipase   Result Value Ref Range    Lipase 199 (H) 13 - 60 U/L   Beta-Hydroxybutyrate   Result Value Ref Range    Beta-Hydroxybutyrate 0.55 (H) 0.02 - 0.27 mmol/L   pH, venous   Result Value Ref Range    pH, Joes 7.45 7.35 - 7.45   T4   Result Value Ref Range    T4, Total 10.1 4.5 - 11.7 mcg/dL   TSH   Result Value Ref Range    TSH 5.940 (H) 0.270 - 4.200 uIU/mL   Troponin   Result Value Ref Range    Troponin, High Sensitivity 21 (H) 0 - 9 ng/L   Microscopic Urinalysis   Result Value Ref Range    WBC, UA 1-3 0 - 5 /HPF    RBC, UA 0-1 0 - 2 /HPF    Epithelial Cells, UA FEW /HPF    Bacteria, UA FEW (A) None Seen /HPF   Lactic Acid   Result Value Ref Range    Lactic Acid 1.6 0.5 - 2.2 mmol/L   EKG 12 Lead   Result Value Ref Range    Ventricular Rate 80 BPM    Atrial Rate 80 BPM    P-R Interval 186 ms    QRS Duration 70 ms    Q-T Interval 398 ms    QTc Calculation (Bazett) 459 ms    P Axis 46 degrees    R Axis 13 degrees    T Axis 65 degrees       RADIOLOGY:  CT ABDOMEN PELVIS WO CONTRAST Additional Contrast? None   Final Result   1. Fatty liver infiltration. 2. Patchy ground-glass parenchymal infiltrates are seen within the lung bases   bilaterally suspicious for multilobar pneumonia, such as viral pneumonia. Could consider correlation with COVID testing given appearance. 3. Diffuse atherosclerosis including coronary artery involvement. 4. Other nonacute findings within the abdomen and pelvis as above. XR CHEST PORTABLE   Final Result   Patchy ground-glass infiltrates seen within the lungs bilaterally suspicious   for multilobar pneumonia, with a possible small left effusion.               ------------------------- NURSING NOTES AND VITALS REVIEWED ---------------------------  Date / Time Roomed:  2/13/2023  9:38 PM  ED Bed Assignment:  15/15    The nursing notes within the ED encounter and vital signs as below have been reviewed.      Patient Vitals for the past 24 hrs:   BP Temp Temp src Pulse Resp SpO2 Height Weight   02/14/23 0330 -- -- -- 74 21 94 % -- --   02/14/23 0327 (!) 167/97 98 °F (36.7 °C) Oral 74 23 91 % -- --   02/14/23 0315 -- -- -- 79 18 (!) 86 % -- --   02/14/23 0030 -- -- -- 76 16 94 % -- --   02/14/23 0015 -- -- -- 74 22 94 % -- --   02/14/23 0000 133/68 -- -- 76 24 92 % -- --   02/13/23 2345 -- -- -- 73 16 91 % -- --   02/13/23 2330 (!) 142/69 -- -- 74 24 (!) 89 % -- --   02/13/23 2315 -- -- -- 78 24 -- -- --   02/13/23 2300 107/65 -- -- 75 24 -- -- --   02/13/23 2245 -- -- -- 75 23 93 % -- --   02/13/23 2230 136/60 -- -- 75 23 93 % -- --   02/13/23 2215 117/66 -- -- 79 23 93 % -- --   02/13/23 2200 102/66 -- -- -- -- -- -- --   02/13/23 2141 128/67 97.9 °F (36.6 °C) Oral 67 17 93 % 5' 2\" (1.575 m) 180 lb (81.6 kg)       Oxygen Saturation Interpretation: Normal and fluctuated throughout her stay ranging hypoxic at 86%    ------------------------------------------ PROGRESS NOTES ------------------------------------------  Re-evaluation(s):  Time: 0300  Patients symptoms show no change  Repeat physical examination is not changed    Counseling:  I have spoken with the patient and discussed todays results, in addition to providing specific details for the plan of care and counseling regarding the diagnosis and prognosis. Their questions are answered at this time and they are agreeable with the plan of admission.    --------------------------------- ADDITIONAL PROVIDER NOTES ---------------------------------  Consultations: This patient's ED course included: a personal history and physicial examination, re-evaluation prior to disposition, multiple bedside re-evaluations, IV medications, cardiac monitoring, and continuous pulse oximetry    This patient has remained hemodynamically stable during their ED course. Diagnosis:  1. COVID-19    2. Pneumonia of both lungs due to infectious organism, unspecified part of lung    3. Hyperglycemia    4. Hypoxia    5. Dehydration        Disposition:  Patient's disposition: Admit to telemetry  Patient's condition is stable.          Sangeeta Richards DO  02/14/23 [id number]

## 2023-02-14 NOTE — ED NOTES
PT Daughter called and was updated on PT status, Daughter was informed of impending admission and all DX and treatment as of 0600 this morning. PT Daughter expressed the desire to speak with her newly appointed nurse and PT when she gets a room upstairs. PT Daughter informed there is not a current bed assignment for PT @ this time.       Sincere Marshall RN  02/14/23 1019

## 2023-02-14 NOTE — H&P
[facility] Hospitalist Group   HISTORY AND PHYSICAL EXAM      AUTHOR: Danielle Ballesteros MD PATIENT NAME: Jovon Rivera   PCP: Chika Birch MD  MRN: [id number], : 1940       CHIEF COMPLAINT / REASON FOR ADMISSION: Cough, SOB, generalized weakness  HPI:   This is a 80 y.o. female  has a past medical history of Acquired hypothyroidism, Combined fat and carbohydrate induced hyperlipemia, Diabetes mellitus ([address] Essential hypertension, and Gout of multiple sites. presented with Cough, SOB, generalized weakness  for last few days prior to arrival to the hospital.  In ED she was found to be COVID19 positive with ambulatory hypoxia. Also feels dehydrated. The patient was seen and examined at bedside, appears alert and awake with no acute distress and is able to answer simple  questions. On direct questioning, patient denied any  resting ongoing chest pain, resting SOB, hemoptysis, productive cough, fever, ongoing palpitation, active abdominal pain, hematemesis, rectal bleeding, alejandro, hematuria, any other  and GI complaints, and any new focal neuro deficits   ROS:  Pertinent positives and negatives are noted in the HPI, all other systems are reviewed and negative    PMH:  Past Medical History:   Diagnosis Date    Acquired hypothyroidism 2017    Combined fat and carbohydrate induced hyperlipemia 10/22/2007    Diabetes mellitus ([facility] [address]     Essential hypertension 2017    Gout of multiple sites 2017       Surgical History:  Past Surgical History:   Procedure Laterality Date    BACK SURGERY      HIP SURGERY Right     HYSTERECTOMY, TOTAL ABDOMINAL (CERVIX REMOVED)         Medications Prior to Admission:    Prior to Admission medications    Medication Sig Start Date End Date Taking?  Authorizing Provider   levothyroxine (SYNTHROID) 88 MCG tablet Take 1 tablet by mouth Daily 11/10/22   Chika Birch MD   atenolol (TENORMIN) 25 MG tablet Take 1 tablet by mouth in the morning and 1 tablet in the evening. 11/10/22   Javi Hatch MD   donepezil (ARICEPT) 5 MG tablet Take 1 tablet by mouth nightly 11/10/22   Javi Hatch MD   metFORMIN (GLUCOPHAGE) 500 MG tablet Take 1 tablet by mouth 2 times daily (with meals) 10/17/22   Javi Hatch MD   SYNTHROID 88 MCG tablet Take 1 tablet by mouth in the morning. 8/8/22   Javi Hatch MD   amLODIPine (NORVASC) 5 MG tablet Take 1 tablet by mouth in the morning. 8/5/22   Javi Hatch MD   Blood Glucose Calibration (QUICKTEK CONTROL SOLUTION) LIQD 1 each by In Vitro route daily Control solution for patient glucose meter to use daily 5/9/22   Javi Hatch MD   BLOOD GLUCOSE MONITOR 1 each Use once daily    Historical Provider, MD   blood glucose monitor strips 1 strip by Other route daily Test 2 times a day & as needed for symptoms of irregular blood glucose. Dispense sufficient amount for indicated testing frequency plus additional to accommodate PRN testing needs. E11.9 4/1/22   Javi Hatch MD   Lancets Thin MISC 1 each by Does not apply route daily E11.9 4/1/22   Javi Hatch MD   blood glucose monitor kit and supplies Dispense sufficient amount for indicated testing frequency plus additional to accommodate PRN testing needs. Dispense all needed supplies to include: monitor, strips, lancing device, lancets, control solutions, alcohol swabs. 4/1/22   Javi Hatch MD   diclofenac sodium (VOLTAREN) 1 % GEL Apply 4 g topically 4 times daily 4/1/22   Javi Hatch MD   rosuvastatin (CRESTOR) 5 MG tablet TAKE ONE TABLET BY MOUTH EVERY DAY 3/4/22   Javi Hatch MD   glipiZIDE (GLUCOTROL) 5 MG tablet Take 1 tablet by mouth daily 3/4/22   Javi Hatch MD   diclofenac sodium (VOLTAREN) 1 % GEL Apply 4 g topically 4 times daily as needed (pain) 9/24/18 9/24/23  Markus Pisano DO   aspirin 81 MG EC tablet Take by mouth 4/30/07   Historical Provider, MD       Allergies:    Patient has no known allergies.     Social History:    reports that she has never smoked. She has never used smokeless tobacco. She reports that she does not drink alcohol and does not use drugs. Family History:   Family history reviewed, no pertinent history     PHYSICAL EXAM:  Vitals:  BP (!) 156/87   Pulse 72   Temp 98 °F (36.7 °C) (Oral)   Resp 21   Ht 5' 2\" (1.575 m)   Wt 180 lb (81.6 kg)   SpO2 95%   BMI 32.92 kg/m²   GENERAL: Appears tired and weak otherwise hemodynamically stable at present. HEENT: PERRLA, no icterus. OP clear and no exudates. NECK: Supple  no carotid/ophthalmic bruits, JVD None. RESPIRATORY:  Bilateral equal vesicular breath sound with no wheezing. Lung bases are clear. HEART: No tachycardia at bedside and regular rhythm. Normal S1 and S2, No S3 or S4 is audible. No pulsation, thrills, murmur or friction rubs. ABDOMEN: Soft, nondistended, nontender. No hepatomegaly or splenomegaly. No CVA tenderness on the both sides. Bowel sound is present. EXTREMITIES: All peripheral pulses are present. No calf tenderness or swelling. No pedal edema is present. hCanel Bradley NEUROLOGY: Alert and awake. No new focal neuro deficit. Bilateral Pupil is equal and reactive to light. CN-ii-xii otherwise grossly intact. Motor and Sensory: Grossly Intact bilaterally with no new focal signs     LABS:  Recent Labs     02/13/23  2146   WBC 9.1   RBC 5.25   HGB 14.6   HCT 44.0   MCV 83.8   MCH 27.8   MCHC 33.2   RDW 13.8      MPV 11.2     Recent Labs     02/13/23  2146   *   K 4.6   CL 93*   CO2 23   BUN 49*   CREATININE 1.4*   GLUCOSE 343*   CALCIUM 9.6     No results for input(s): POCGLU in the last 72 hours.   Results for orders placed or performed during the hospital encounter of 02/13/23   COVID-19, Rapid    Specimen: Nasopharyngeal Swab   Result Value Ref Range    SARS-CoV-2, NAAT DETECTED (A) Not Detected   Lactate, Sepsis   Result Value Ref Range    Lactic Acid, Sepsis 1.8 0.5 - 1.9 mmol/L   Lactate, Sepsis   Result Value Ref Range    Lactic Acid, Sepsis 2.3 (H) 0.5 - 1.9 mmol/L   CBC with Auto Differential   Result Value Ref Range    WBC 9.1 4.5 - 11.5 E9/L    RBC 5.25 3.50 - 5.50 E12/L    Hemoglobin 14.6 11.5 - 15.5 g/dL    Hematocrit 44.0 34.0 - 48.0 %    MCV 83.8 80.0 - 99.9 fL    MCH 27.8 26.0 - 35.0 pg    MCHC 33.2 32.0 - 34.5 %    RDW 13.8 11.5 - 15.0 fL    Platelets 312 542 - 581 E9/L    MPV 11.2 7.0 - 12.0 fL    Neutrophils % 76.2 43.0 - 80.0 %    Immature Granulocytes % 1.2 0.0 - 5.0 %    Lymphocytes % 12.9 (L) 20.0 - 42.0 %    Monocytes % 9.1 2.0 - 12.0 %    Eosinophils % 0.4 0.0 - 6.0 %    Basophils % 0.2 0.0 - 2.0 %    Neutrophils Absolute 6.89 1.80 - 7.30 E9/L    Immature Granulocytes # 0.11 E9/L    Lymphocytes Absolute 1.17 (L) 1.50 - 4.00 E9/L    Monocytes Absolute 0.82 0.10 - 0.95 E9/L    Eosinophils Absolute 0.04 (L) 0.05 - 0.50 E9/L    Basophils Absolute 0.02 0.00 - 0.20 E9/L   Comprehensive Metabolic Panel   Result Value Ref Range    Sodium 130 (L) 132 - 146 mmol/L    Potassium 4.6 3.5 - 5.0 mmol/L    Chloride 93 (L) 98 - 107 mmol/L    CO2 23 22 - 29 mmol/L    Anion Gap 14 7 - 16 mmol/L    Glucose 343 (H) 74 - 99 mg/dL    BUN 49 (H) 6 - 23 mg/dL    Creatinine 1.4 (H) 0.5 - 1.0 mg/dL    Est, Glom Filt Rate 37 >=60 mL/min/1.73    Calcium 9.6 8.6 - 10.2 mg/dL    Total Protein 7.7 6.4 - 8.3 g/dL    Albumin 3.5 3.5 - 5.2 g/dL    Total Bilirubin 0.4 0.0 - 1.2 mg/dL    Alkaline Phosphatase 59 35 - 104 U/L    ALT 27 0 - 32 U/L    AST 32 (H) 0 - 31 U/L   Brain Natriuretic Peptide   Result Value Ref Range    Pro- (H) 0 - 450 pg/mL   Troponin   Result Value Ref Range    Troponin, High Sensitivity 24 (H) 0 - 9 ng/L   Urinalysis   Result Value Ref Range    Color, UA Yellow Straw/Yellow    Clarity, UA Clear Clear    Glucose, Ur 100 (A) Negative mg/dL    Bilirubin Urine Negative Negative    Ketones, Urine Negative Negative mg/dL    Specific Gravity, UA 1.025 1.005 - 1.030    Blood, Urine TRACE (A) Negative    pH, UA 5.0 5.0 - 9.0    Protein, UA 30 (A) Negative mg/dL    Urobilinogen, Urine 0.2 <2.0 E.U./dL    Nitrite, Urine Negative Negative    Leukocyte Esterase, Urine Negative Negative   Lipase   Result Value Ref Range    Lipase 199 (H) 13 - 60 U/L   Beta-Hydroxybutyrate   Result Value Ref Range    Beta-Hydroxybutyrate 0.55 (H) 0.02 - 0.27 mmol/L   pH, venous   Result Value Ref Range    pH, Jose 7.45 7.35 - 7.45   T4   Result Value Ref Range    T4, Total 10.1 4.5 - 11.7 mcg/dL   TSH   Result Value Ref Range    TSH 5.940 (H) 0.270 - 4.200 uIU/mL   Troponin   Result Value Ref Range    Troponin, High Sensitivity 21 (H) 0 - 9 ng/L   Microscopic Urinalysis   Result Value Ref Range    WBC, UA 1-3 0 - 5 /HPF    RBC, UA 0-1 0 - 2 /HPF    Epithelial Cells, UA FEW /HPF    Bacteria, UA FEW (A) None Seen /HPF   Lactic Acid   Result Value Ref Range    Lactic Acid 1.6 0.5 - 2.2 mmol/L   EKG 12 Lead   Result Value Ref Range    Ventricular Rate 80 BPM    Atrial Rate 80 BPM    P-R Interval 186 ms    QRS Duration 70 ms    Q-T Interval 398 ms    QTc Calculation (Bazett) 459 ms    P Axis 46 degrees    R Axis 13 degrees    T Axis 65 degrees     ED Course as of 02/14/23 0558   Tue Feb 14, 2023   0340 Case discussed with Dr. Gwendolyn Avila, detailed overview given, they will admit the patient. [NC]      ED Course User Index  [NC] Nessa Phelps, DO     Radiology: CT ABDOMEN PELVIS WO CONTRAST Additional Contrast? None    Result Date: 2/14/2023  EXAMINATION: CT OF THE ABDOMEN AND PELVIS WITHOUT CONTRAST 2/14/2023 1:25 am TECHNIQUE: CT of the abdomen and pelvis was performed without the administration of intravenous contrast. Multiplanar reformatted images are provided for review. Automated exposure control, iterative reconstruction, and/or weight based adjustment of the mA/kV was utilized to reduce the radiation dose to as low as reasonably achievable. COMPARISON: None.  HISTORY: ORDERING SYSTEM PROVIDED HISTORY: elevated lipase TECHNOLOGIST PROVIDED HISTORY: Additional Contrast?->None Reason for exam:->elevated lipase Decision Support Exception - unselect if not a suspected or confirmed emergency medical condition->Emergency Medical Condition (MA) FINDINGS: Lower Chest: No cardiomegaly. Coronary artery atherosclerosis. Aortic valvular calcifications are identified. No distal esophageal thickening is seen. Multilobar patchy ground-glass infiltrates are seen within the lungs bilaterally. Mitral annular calcifications are noted. Organs: Fatty liver infiltration. No adrenal mass is identified. No pancreatic mass. No peripancreatic inflammatory process. Low-attenuation benign-appearing cysts noted within the right kidney. No right-sided or left-sided renal calculi. No hydroureteronephrosis is seen. GI/Bowel: No acute inflammatory bowel process, ileus or obstruction. Colonic diverticulosis is noted. Pelvis: No pelvic mass. The bladder is unremarkable. No free fluid in the pelvis. No pelvic lymphadenopathy. Hysterectomy. Peritoneum/Retroperitoneum: Aorto iliac atherosclerosis. No retroperitoneal or mesenteric lymphadenopathy. No bowel herniation is seen. Bones/Soft Tissues: Postoperative changes seen related to right hip arthroplasty. Degenerative changes at the pubic symphysis, left hip joint, SI joint bilaterally as well as the spine. Greatest disc disease noted at the lumbosacral junction. Moderate multilevel spinal canal stenosis. Foraminal narrowing seen within the lumbar spine as well, greatest at L4-L5. 1. Fatty liver infiltration. 2. Patchy ground-glass parenchymal infiltrates are seen within the lung bases bilaterally suspicious for multilobar pneumonia, such as viral pneumonia. Could consider correlation with COVID testing given appearance. 3. Diffuse atherosclerosis including coronary artery involvement. 4. Other nonacute findings within the abdomen and pelvis as above.      XR CHEST PORTABLE    Result Date: 2/13/2023  EXAMINATION: ONE XRAY VIEW OF THE CHEST 2/13/2023 9:52 pm COMPARISON: None. HISTORY: ORDERING SYSTEM PROVIDED HISTORY: Cough, COVID TECHNOLOGIST PROVIDED HISTORY: Reason for exam:->Cough, COVID FINDINGS: Cardiac size is enlarged.  Patchy ground-glass infiltrates are seen throughout the lungs bilaterally, greatest in the right mid lung and both lung bases.  Small left-sided pleural effusion suspected.  No pneumothorax. Degenerative changes in the shoulders and spine.  No acute osseous fracture is identified.  Aortic atherosclerosis.     Patchy ground-glass infiltrates seen within the lungs bilaterally suspicious for multilobar pneumonia, with a possible small left effusion.     ASSESSMENT:    Present on Admission:   Pneumonia due to COVID-19 virus    PLAN:  # COVID19 pneumonia   Has ambulatory hypoxia with intermittent resting hypoxia   CXR showed some infiltrated   IV Ceftriaxone + Doxy + Decadron   Monitor vitals   ID consult in AM  # JACKIE - secondary to dehydration           Continue IV fluid and encourge PO fluid              Monitor I/O, BUN/Cr accordingly.  # Hypertension   Currently better controlled  Will resume home medications as needed.  Monitor vitals and adjust BP meds as needed  # DM moderately controlled  On Low carb diet  Nutritional and dietary counseling   Placed on sliding scale inulin   Bedside glucose before meals and bedtime and adjust insulin accordingly  # Hypothyroid   No active complaints   Continue home dose of thyroid meds  # Rest of the chronic medical problems are stable and will be managed with appropriately with home medications, placed nursing communication order to verify home medications before giving them to the patient.  # Diet: On PO Diet  # IVF's: No  # Fall Precaution: Yes  # Disposition: Home/primary residence   # Code Status: Full code  # DVT Prophylaxis : Lovenox SC  The patient at bedside was counseled about clinical status, laboratory/imaging results, diagnoses, medication side effects, risk,  and treatment plan, all questions were answered to patient's satisfaction and verbalized understanding      SIGNATURE: David Tillman MD PATIENT NAME: Alysha Orta   CONTACT #: Hospitalist on call MRN: [id number]     Disclaimer: Portions of this note may have been generated using Dragon voice recognition software. Reasonable efforts were made to correct any dictation errors that resulted due to the programming of this software but some may still be present.

## 2023-02-14 NOTE — ED NOTES
Tosha called in requesting results and is pt. Being admitted. Phone # taken will have bedside RN call with CT results /admission.      Ching March RN  02/14/23 0114

## 2023-02-15 PROBLEM — R78.81 GRAM-POSITIVE BACTEREMIA: Status: ACTIVE | Noted: 2023-02-15

## 2023-02-15 LAB
ACINETOBACTER CALCOAC BAUMANNII COMPLEX BY PCR: NOT DETECTED
ALBUMIN SERPL-MCNC: 3 G/DL (ref 3.5–5.2)
ALP BLD-CCNC: 50 U/L (ref 35–104)
ALT SERPL-CCNC: 18 U/L (ref 0–32)
ANION GAP SERPL CALCULATED.3IONS-SCNC: 13 MMOL/L (ref 7–16)
AST SERPL-CCNC: 17 U/L (ref 0–31)
BACTEROIDES FRAGILIS BY PCR: NOT DETECTED
BASOPHILS ABSOLUTE: 0 E9/L (ref 0–0.2)
BASOPHILS RELATIVE PERCENT: 0.1 % (ref 0–2)
BILIRUB SERPL-MCNC: 0.3 MG/DL (ref 0–1.2)
BOTTLE TYPE: ABNORMAL
BUN BLDV-MCNC: 32 MG/DL (ref 6–23)
BURR CELLS: ABNORMAL
C-REACTIVE PROTEIN: 5.7 MG/DL (ref 0–0.4)
CALCIUM SERPL-MCNC: 8.7 MG/DL (ref 8.6–10.2)
CANDIDA ALBICANS BY PCR: NOT DETECTED
CANDIDA AURIS BY PCR: NOT DETECTED
CANDIDA GLABRATA BY PCR: NOT DETECTED
CANDIDA KRUSEI BY PCR: NOT DETECTED
CANDIDA PARAPSILOSIS BY PCR: NOT DETECTED
CANDIDA TROPICALIS BY PCR: NOT DETECTED
CHLORIDE BLD-SCNC: 104 MMOL/L (ref 98–107)
CO2: 19 MMOL/L (ref 22–29)
CREAT SERPL-MCNC: 1.1 MG/DL (ref 0.5–1)
CREAT SERPL-MCNC: 1.1 MG/DL (ref 0.5–1)
CRYPTOCOCCUS NEOFORMANS/GATTII BY PCR: NOT DETECTED
D DIMER: 475 NG/ML DDU
ENTEROBACTER CLOACAE COMPLEX BY PCR: NOT DETECTED
ENTEROBACTERALES BY PCR: NOT DETECTED
ENTEROCOCCUS FAECALIS BY PCR: NOT DETECTED
ENTEROCOCCUS FAECIUM BY PCR: NOT DETECTED
EOSINOPHILS ABSOLUTE: 0 E9/L (ref 0.05–0.5)
EOSINOPHILS RELATIVE PERCENT: 0 % (ref 0–6)
ESCHERICHIA COLI BY PCR: NOT DETECTED
FERRITIN: 1004 NG/ML
FIBRINOGEN: >700 MG/DL (ref 200–400)
GFR SERPL CREATININE-BSD FRML MDRD: 50 ML/MIN/1.73
GFR SERPL CREATININE-BSD FRML MDRD: 50 ML/MIN/1.73
GLUCOSE BLD-MCNC: 387 MG/DL (ref 74–99)
HAEMOPHILUS INFLUENZAE BY PCR: NOT DETECTED
HBA1C MFR BLD: 12.2 % (ref 4–5.6)
HCT VFR BLD CALC: 38 % (ref 34–48)
HEMOGLOBIN: 12 G/DL (ref 11.5–15.5)
INR BLD: 1.1
KLEBSIELLA AEROGENES BY PCR: NOT DETECTED
KLEBSIELLA OXYTOCA BY PCR: NOT DETECTED
KLEBSIELLA PNEUMONIAE GROUP BY PCR: NOT DETECTED
LACTATE DEHYDROGENASE: 226 U/L (ref 135–214)
LACTIC ACID: 1.8 MMOL/L (ref 0.5–2.2)
LISTERIA MONOCYTOGENES BY PCR: NOT DETECTED
LYMPHOCYTES ABSOLUTE: 0.29 E9/L (ref 1.5–4)
LYMPHOCYTES RELATIVE PERCENT: 4.3 % (ref 20–42)
MAGNESIUM: 2.1 MG/DL (ref 1.6–2.6)
MCH RBC QN AUTO: 26.7 PG (ref 26–35)
MCHC RBC AUTO-ENTMCNC: 31.6 % (ref 32–34.5)
MCV RBC AUTO: 84.4 FL (ref 80–99.9)
METER GLUCOSE: 312 MG/DL (ref 74–99)
METER GLUCOSE: 333 MG/DL (ref 74–99)
METER GLUCOSE: 335 MG/DL (ref 74–99)
METER GLUCOSE: 437 MG/DL (ref 74–99)
METER GLUCOSE: 466 MG/DL (ref 74–99)
MONOCYTES ABSOLUTE: 0.22 E9/L (ref 0.1–0.95)
MONOCYTES RELATIVE PERCENT: 2.6 % (ref 2–12)
MYELOCYTE PERCENT: 0.9 % (ref 0–0)
NEISSERIA MENINGITIDIS BY PCR: NOT DETECTED
NEUTROPHILS ABSOLUTE: 6.7 E9/L (ref 1.8–7.3)
NEUTROPHILS RELATIVE PERCENT: 92.2 % (ref 43–80)
ORDER NUMBER: ABNORMAL
PDW BLD-RTO: 13.6 FL (ref 11.5–15)
PHOSPHORUS: 2.1 MG/DL (ref 2.5–4.5)
PLATELET # BLD: 291 E9/L (ref 130–450)
PMV BLD AUTO: 10.9 FL (ref 7–12)
POIKILOCYTES: ABNORMAL
POTASSIUM SERPL-SCNC: 4.3 MMOL/L (ref 3.5–5)
PROCALCITONIN: 0.26 NG/ML (ref 0–0.08)
PROTEUS SPECIES BY PCR: NOT DETECTED
PROTHROMBIN TIME: 12.6 SEC (ref 9.3–12.4)
PSEUDOMONAS AERUGINOSA BY PCR: NOT DETECTED
RBC # BLD: 4.5 E12/L (ref 3.5–5.5)
SALMONELLA SPECIES BY PCR: NOT DETECTED
SEDIMENTATION RATE, ERYTHROCYTE: 93 MM/HR (ref 0–20)
SERRATIA MARCESCENS BY PCR: NOT DETECTED
SODIUM BLD-SCNC: 136 MMOL/L (ref 132–146)
SOURCE OF BLOOD CULTURE: ABNORMAL
STAPHYLOCOCCUS AUREUS BY PCR: NOT DETECTED
STAPHYLOCOCCUS EPIDERMIDIS BY PCR: NOT DETECTED
STAPHYLOCOCCUS LUGDUNENSIS BY PCR: NOT DETECTED
STAPHYLOCOCCUS SPECIES BY PCR: DETECTED
STENOTROPHOMONAS MALTOPHILIA BY PCR: NOT DETECTED
STREPTOCOCCUS AGALACTIAE BY PCR: NOT DETECTED
STREPTOCOCCUS PNEUMONIAE BY PCR: NOT DETECTED
STREPTOCOCCUS PYOGENES  BY PCR: NOT DETECTED
STREPTOCOCCUS SPECIES BY PCR: NOT DETECTED
TARGET CELLS: ABNORMAL
TEAR DROP CELLS: ABNORMAL
TOTAL CK: 48 U/L (ref 20–180)
TOTAL PROTEIN: 6.6 G/DL (ref 6.4–8.3)
TROPONIN, HIGH SENSITIVITY: 16 NG/L (ref 0–9)
WBC # BLD: 7.2 E9/L (ref 4.5–11.5)

## 2023-02-15 PROCEDURE — 83735 ASSAY OF MAGNESIUM: CPT

## 2023-02-15 PROCEDURE — 82565 ASSAY OF CREATININE: CPT

## 2023-02-15 PROCEDURE — 6370000000 HC RX 637 (ALT 250 FOR IP): Performed by: INTERNAL MEDICINE

## 2023-02-15 PROCEDURE — 6360000002 HC RX W HCPCS: Performed by: INTERNAL MEDICINE

## 2023-02-15 PROCEDURE — 2580000003 HC RX 258: Performed by: INTERNAL MEDICINE

## 2023-02-15 PROCEDURE — 85378 FIBRIN DEGRADE SEMIQUANT: CPT

## 2023-02-15 PROCEDURE — 85610 PROTHROMBIN TIME: CPT

## 2023-02-15 PROCEDURE — 83605 ASSAY OF LACTIC ACID: CPT

## 2023-02-15 PROCEDURE — 85384 FIBRINOGEN ACTIVITY: CPT

## 2023-02-15 PROCEDURE — 86140 C-REACTIVE PROTEIN: CPT

## 2023-02-15 PROCEDURE — 1200000000 HC SEMI PRIVATE

## 2023-02-15 PROCEDURE — 99233 SBSQ HOSP IP/OBS HIGH 50: CPT | Performed by: INTERNAL MEDICINE

## 2023-02-15 PROCEDURE — 97165 OT EVAL LOW COMPLEX 30 MIN: CPT | Performed by: OCCUPATIONAL THERAPIST

## 2023-02-15 PROCEDURE — 85651 RBC SED RATE NONAUTOMATED: CPT

## 2023-02-15 PROCEDURE — 85025 COMPLETE CBC W/AUTO DIFF WBC: CPT

## 2023-02-15 PROCEDURE — 97110 THERAPEUTIC EXERCISES: CPT | Performed by: PHYSICAL THERAPIST

## 2023-02-15 PROCEDURE — 80053 COMPREHEN METABOLIC PANEL: CPT

## 2023-02-15 PROCEDURE — 84100 ASSAY OF PHOSPHORUS: CPT

## 2023-02-15 PROCEDURE — 84145 PROCALCITONIN (PCT): CPT

## 2023-02-15 PROCEDURE — 83615 LACTATE (LD) (LDH) ENZYME: CPT

## 2023-02-15 PROCEDURE — 83036 HEMOGLOBIN GLYCOSYLATED A1C: CPT

## 2023-02-15 PROCEDURE — 82550 ASSAY OF CK (CPK): CPT

## 2023-02-15 PROCEDURE — 2500000003 HC RX 250 WO HCPCS: Performed by: INTERNAL MEDICINE

## 2023-02-15 PROCEDURE — 82728 ASSAY OF FERRITIN: CPT

## 2023-02-15 PROCEDURE — 36415 COLL VENOUS BLD VENIPUNCTURE: CPT

## 2023-02-15 PROCEDURE — 97530 THERAPEUTIC ACTIVITIES: CPT | Performed by: OCCUPATIONAL THERAPIST

## 2023-02-15 PROCEDURE — 84484 ASSAY OF TROPONIN QUANT: CPT

## 2023-02-15 PROCEDURE — 97161 PT EVAL LOW COMPLEX 20 MIN: CPT | Performed by: PHYSICAL THERAPIST

## 2023-02-15 PROCEDURE — 82962 GLUCOSE BLOOD TEST: CPT

## 2023-02-15 RX ORDER — INSULIN LISPRO 100 [IU]/ML
0-4 INJECTION, SOLUTION INTRAVENOUS; SUBCUTANEOUS NIGHTLY
Status: DISCONTINUED | OUTPATIENT
Start: 2023-02-15 | End: 2023-02-17 | Stop reason: HOSPADM

## 2023-02-15 RX ORDER — INSULIN LISPRO 100 [IU]/ML
0-8 INJECTION, SOLUTION INTRAVENOUS; SUBCUTANEOUS
Status: DISCONTINUED | OUTPATIENT
Start: 2023-02-15 | End: 2023-02-17 | Stop reason: HOSPADM

## 2023-02-15 RX ORDER — INSULIN GLARGINE 100 [IU]/ML
18 INJECTION, SOLUTION SUBCUTANEOUS NIGHTLY
Status: DISCONTINUED | OUTPATIENT
Start: 2023-02-15 | End: 2023-02-16

## 2023-02-15 RX ORDER — INSULIN GLARGINE 100 [IU]/ML
15 INJECTION, SOLUTION SUBCUTANEOUS NIGHTLY
Status: DISCONTINUED | OUTPATIENT
Start: 2023-02-15 | End: 2023-02-15

## 2023-02-15 RX ORDER — INSULIN LISPRO 100 [IU]/ML
5 INJECTION, SOLUTION INTRAVENOUS; SUBCUTANEOUS
Status: DISCONTINUED | OUTPATIENT
Start: 2023-02-15 | End: 2023-02-16

## 2023-02-15 RX ADMIN — DOXYCYCLINE 100 MG: 100 INJECTION, POWDER, LYOPHILIZED, FOR SOLUTION INTRAVENOUS at 05:58

## 2023-02-15 RX ADMIN — ROSUVASTATIN CALCIUM 5 MG: 5 TABLET, FILM COATED ORAL at 15:00

## 2023-02-15 RX ADMIN — INSULIN LISPRO 8 UNITS: 100 INJECTION, SOLUTION INTRAVENOUS; SUBCUTANEOUS at 17:38

## 2023-02-15 RX ADMIN — INSULIN LISPRO 4 UNITS: 100 INJECTION, SOLUTION INTRAVENOUS; SUBCUTANEOUS at 20:57

## 2023-02-15 RX ADMIN — DEXAMETHASONE 6 MG: 6 TABLET ORAL at 08:34

## 2023-02-15 RX ADMIN — INSULIN LISPRO 6 UNITS: 100 INJECTION, SOLUTION INTRAVENOUS; SUBCUTANEOUS at 12:20

## 2023-02-15 RX ADMIN — ATENOLOL 25 MG: 50 TABLET ORAL at 17:33

## 2023-02-15 RX ADMIN — DONEPEZIL HYDROCHLORIDE 5 MG: 5 TABLET, FILM COATED ORAL at 20:47

## 2023-02-15 RX ADMIN — ENOXAPARIN SODIUM 40 MG: 100 INJECTION SUBCUTANEOUS at 08:41

## 2023-02-15 RX ADMIN — DOXYCYCLINE 100 MG: 100 INJECTION, POWDER, LYOPHILIZED, FOR SOLUTION INTRAVENOUS at 17:36

## 2023-02-15 RX ADMIN — INSULIN LISPRO 5 UNITS: 100 INJECTION, SOLUTION INTRAVENOUS; SUBCUTANEOUS at 17:37

## 2023-02-15 RX ADMIN — ASPIRIN 81 MG: 81 TABLET, COATED ORAL at 08:34

## 2023-02-15 RX ADMIN — Medication 10 ML: at 20:47

## 2023-02-15 RX ADMIN — ATENOLOL 25 MG: 50 TABLET ORAL at 08:34

## 2023-02-15 RX ADMIN — VANCOMYCIN HYDROCHLORIDE 1000 MG: 1 INJECTION, POWDER, LYOPHILIZED, FOR SOLUTION INTRAVENOUS at 12:19

## 2023-02-15 RX ADMIN — LEVOTHYROXINE SODIUM 88 MCG: 0.09 TABLET ORAL at 05:57

## 2023-02-15 RX ADMIN — INSULIN GLARGINE 18 UNITS: 100 INJECTION, SOLUTION SUBCUTANEOUS at 21:00

## 2023-02-15 RX ADMIN — INSULIN LISPRO 6 UNITS: 100 INJECTION, SOLUTION INTRAVENOUS; SUBCUTANEOUS at 08:41

## 2023-02-15 RX ADMIN — CEFTRIAXONE 1000 MG: 1 INJECTION, POWDER, FOR SOLUTION INTRAMUSCULAR; INTRAVENOUS at 05:57

## 2023-02-15 RX ADMIN — AMLODIPINE BESYLATE 5 MG: 5 TABLET ORAL at 08:34

## 2023-02-15 NOTE — PROGRESS NOTES
Pharmacy Consultation Note  (Antibiotic Dosing and Monitoring)    Initial consult date: 2/15/23  Consulting physician/provider: Chema  Drug: Vancomycin  Indication: Bloodstream infection    Age/  Gender Height Weight IBW  Allergy Information   82 y.o./female 5' 2\" (157.5 cm) 180 lb (81.6 kg)     Ideal body weight: 50.1 kg (110 lb 7.2 oz)  Adjusted ideal body weight: 62.7 kg (138 lb 4.3 oz)   Patient has no known allergies. Renal Function:  Recent Labs     02/13/23 2146   BUN 49*   CREATININE 1.4*       Intake/Output Summary (Last 24 hours) at 2/15/2023 1030  Last data filed at 2/15/2023 0901  Gross per 24 hour   Intake 900 ml   Output --   Net 900 ml       Vancomycin Monitoring:  Trough:  No results for input(s): VANCOTROUGH in the last 72 hours. Random:  No results for input(s): VANCORANDOM in the last 72 hours. Recent Labs     02/13/23 2146   BLOODCULT2 Gram stain performed from blood culture bottle media  Gram positive cocci in clusters  *        Historical Cultures:  No results found for: [facility]  Recent Labs     02/13/23 2146   BC Gram stain performed from blood culture bottle media  Gram positive cocci in clusters  *       Vancomycin Administration Times:  Recent vancomycin administrations        No vancomycin IV orders with administrations found. Orders not given:            vancomycin (VANCOCIN) 1,000 mg in sodium chloride 0.9 % 250 mL IVPB ([id number])                    Assessment:  Patient is a 80 y.o. female who has been initiated on vancomycin  Estimated Creatinine Clearance: 31 mL/min (A) (based on SCr of 1.4 mg/dL (H)). To dose vancomycin, pharmacy will be utilizing Gini[employer] calculation software for goal AUC/ANIKA 400-600 mg/L-hr    Plan:   Will continue vancomycin 1000 mg IV every 24 hours  Will check vancomycin levels when appropriate  Will continue to monitor renal function   Pharmacy to follow      Jose Antonio Mendoza [facility] 2/15/2023 10:30 AM  22

## 2023-02-15 NOTE — CARE COORDINATION
Case Management Assessment  Initial Evaluation    Date/Time of Evaluation: 2/15/2023 4:33 PM  Assessment Completed by: Rachel Deutsch Cancer    If patient is discharged prior to next notation, then this note serves as note for discharge by case management. Patient Name: Bette Davis                   YOB: 1940  Diagnosis: Dehydration [E86.0]  Hyperglycemia [R73.9]  Hypoxia [R09.02]  Pneumonia of both lungs due to infectious organism, unspecified part of lung [J18.9]  Pneumonia due to COVID-19 virus [U07.1, J12.82]  COVID-19 [U07.1]                   Date / Time: 2/13/2023  9:38 PM    Patient Admission Status: Inpatient   Readmission Risk (Low < 19, Mod (19-27), High > 27): Readmission Risk Score: 12.7    Current PCP: Javi Hatch MD  PCP verified by CM? Chart Reviewed: Yes      History Provided by:    Patient Orientation:      Patient Cognition:      Hospitalization in the last 30 days (Readmission):  No    If yes, Readmission Assessment in  Navigator will be completed. Advance Directives:      Code Status: Full Code   Patient's Primary Decision Maker is:      Primary Decision Maker: Rm Crawfordry - Spouse - [phone]    Secondary Decision Maker: shane hilario - Child - [phone]    Supplemental (Other) Decision Maker: Faiza Treviño - Brother/Sister - [phone]    Discharge Planning:    Patient lives with: Spouse/Significant Other, Children Type of Home: House  Primary Care Giver:    Patient Support Systems include: Spouse/Significant Other, Children   Current Financial resources:    Current community resources:    Current services prior to admission: None            Current DME:              Type of Home Care services:  None    ADLS  Prior functional level:    Current functional level:      PT AM-PAC: 17 /24  OT AM-PAC: 16 /24    Family can provide assistance at DC:     Would you like Case Management to discuss the discharge plan with any other family members/significant others, and if so, who? Plans to Return to Present Housing:    Other Identified Issues/Barriers to RETURNING to current housing: yes  Potential Assistance needed at discharge: N/A            Potential DME:    Patient expects to discharge to: [address] [place] [address] for transportation at discharge:      Financial    Payor: Bridgette Romero / Plan: Devin Sarabia PPO / Product Type: Medicare /     Does insurance require precert for SNF: Yes    Potential assistance Purchasing Medications:    Meds-to-Beds request: Yes      [facility] [address] [place], [place] - [address] [place] [address] - P [phone] Roverto Frank [phone]  [address] [id number] [place] [address] [address] [facility]  Phone: [phone] Fax: [address] [place] [address] [phone] - f [phone]  [address] [place] [address]. Laly Rush [address]  Phone: [phone] Fax: [phone]      Notes:    Factors facilitating achievement of predicted outcomes: Family support    Barriers to discharge: Cognitive deficit    Additional Case Management Notes: COVID POSITIVE 2/14. Pt is on room air. He has a peripheral line. He is on IV Vanc, Rocephin and Vibramycin. Pt has Alzheirmers Dementia per record. PT and OT are on. Per Dr Rukhsana Mercedes note pt will need a NINI, blood cultures and abx. SW could not get pt to answer the phone. SW made contact with pt's  today. He assisted with the assessment and ACP questions today. They reside in a 2 floor plan with 8 steps with stair lift. Pt was I pta with fransisco razamatts and  drives. Pt has and uses a straight cane at home. Pt has no hx of [facility] SNF or rehab. Pt's PCP is Dr Nellie Nolasco and they use [facility] [address]. Will await PT/OT recommendations for the final dch plan. SS to continue. Karen Walsh, ANNIE2/15/2023. .4:41 PM.     The Plan for Transition of Care is related to the following treatment goals of Dehydration [E86.0]  Hyperglycemia [R73.9]  Hypoxia [R09.02]  Pneumonia of both lungs due to infectious organism, unspecified part of lung [J18.9]  Pneumonia due to COVID-19 virus [U07.1, J12.82]  COVID-19 [O67.5]    IF APPLICABLE: The Patient and/or patient representative Desiree Officer and her family were provided with a choice of provider and agrees with the discharge plan. Freedom of choice list with basic dialogue that supports the patient's individualized plan of care/goals and shares the quality data associated with the providers was provided to:     Patient Representative Name:       The Patient and/or Patient Representative Agree with the Discharge Plan? [place] [facility]  Case Management Department  Ph: [phone]

## 2023-02-15 NOTE — PROGRESS NOTES
NAME: Segundo Murcia  MR:  [id number]  :   1940  Admit Date:  2023      This is a face to face encounter with Segunod Murcia    Elements of this note, including Diagnosis,  Interval History, Past Medical/Surgical/Family/Social Histories, ROS, physical exam, and Assessment and Plan were copied and pasted from Previous. Updates have been made where noted and reflect current exam and medical decision making from the DOS of this encounter. CHIEF COMPLAINT     ID following for   Chief Complaint   Patient presents with    Other     States she is 10 days post COVID and has not been eating or drinking. Denies pain, N/V/D.     HISTORY OF PRESENT ILLNESS     Segundo Murcia is a 80 y.o. female who presents with   Chief Complaint   Patient presents with    Other     States she is 10 days post COVID and has not been eating or drinking. Denies pain, N/V/D.     2023 and has  has a past medical history of Acquired hypothyroidism, Combined fat and carbohydrate induced hyperlipemia, Diabetes mellitus ([address] Essential hypertension, and Gout of multiple sites. Pt seen and examined 02/15/23  In bed has no c/o on RA     Patient is tolerating medications. No reported adverse drug reactions. Available labs, imaging studies, microbiologic studies have been reviewed with pt and family if present. Assessment & Plan   Pt was admitted with   Dehydration [E86.0]  Hyperglycemia [R73.9]  Hypoxia [R09.02]  Pneumonia of both lungs due to infectious organism, unspecified part of lung [J18.9]  Pneumonia due to COVID-19 virus [U07.1, J12.82]  COVID-19 [U07.1]    ID following for     Covid  RA  Gpc clusters bacteremia  ? contaminant   Leandro      vancomycin (VANCOCIN) 1,000 mg in sodium chloride 0.9 % 250 mL IVPB ([id number]), Q24H  cefTRIAXone (ROCEPHIN) 1,000 mg in sterile water 10 mL IV syringe, Q24H  doxycycline (VIBRAMYCIN) 100 mg in sodium chloride 0.9 % 100 mL IVPB ([id number]), Q12H  dexamethasone (DECADRON) tablet 6 mg, Daily     Stop ceftraixone  Check procal     BP (!) 152/70   Pulse 75   Temp 97.5 °F (36.4 °C) (Oral)   Resp 21   Ht 5' 2\" (1.575 m)   Wt 180 lb (81.6 kg)   SpO2 90%   BMI 32.92 kg/m²   Temp  Av.9 °F (36.6 °C)  Min: 97.5 °F (36.4 °C)  Max: 98.1 °F (36.7 °C)  CONSTITUTIONAL:  no apparent distress, and appears stated age  ENT:  Normocephalic, atraumatic,  external ears without lesions, oral pharynx with moist mucus membranes,    LUNGS:  No increased work of breathing, dec to auscultation bilaterally, no crackles or wheezing  CARDIOVASCULAR:  Normal apical impulse, regular rate and rhythm, normal S1 and S2,  no murmur noted  ABDOMEN:  normal bowel sounds, soft, non-distended, non-tender  MUSCULOSKELETAL:  There is no redness, warmth, or swelling  BLE. Full range of motion     NEUROLOGIC:  Awake, alert, oriented. Cranial nerves II-XII are grossly intact. SKIN:  normal skin color, texture, turgor and no rashes  Lines:         Peripheral Intravenous Line:  Peripheral IV 23 Left;Dorsal Forearm (Active)   Site Assessment Clean, dry & intact 23   Line Status Blood return noted; Flushed; Infusing 23   Line Care Connections checked and tightened 23   Phlebitis Assessment No symptoms 23   Infiltration Assessment 0 23   Alcohol Cap Used No 23   Dressing Status New dressing applied 23   Dressing Type Transparent 23          Microbiology:   Recent Labs     23   COVID19 DETECTED*     Lab Results   Component Value Date/Time    [facility]  2023 09:46 PM     Gram stain performed from blood culture bottle media  Gram positive cocci in clusters      BLOODCULT2  2023 09:46 PM     Gram stain performed from blood culture bottle media  Gram positive cocci in clusters           LABS:  Recent Labs     236 02/15/23  1022   WBC 9.1 7.2   RBC 5.25 4.50   HGB 14.6 12.0   HCT 44.0 38.0   MCV 83.8 84.4   MCH 27.8 26.7   MCHC 33.2 31.6*   RDW 13.8 13.6    291   MPV 11.2 10.9     Recent Labs     02/13/23  2146 02/15/23  1022   * 136   K 4.6 4.3   CL 93* 104   CO2 23 19*   BUN 49* 32*   CREATININE 1.4* 1.1*   GLUCOSE 343* 387*   PROT 7.7 6.6   LABALBU 3.5 3.0*   CALCIUM 9.6 8.7   BILITOT 0.4 0.3   ALKPHOS 59 50   AST 32* 17   ALT 27 18     No results found for: SEDRATE  No results found for: CRP  No results found for: PROCAL  Recent Labs     02/13/23  2146 02/15/23  1022   LDH  --  226*   DDIMER  --  475   FIBRINOGEN  --  >700*   INR  --  1.1   PROTIME  --  12.6*   AST 32* 17   ALT 27 18       REVIEW OF SYSTEMS     As stated above in the chief complaint, otherwise negative.   CURRENT MEDICATIONS     Current Facility-Administered Medications:     insulin glargine (LANTUS) injection vial 15 Units, 15 Units, SubCUTAneous, Nightly, Kale Bear DO    insulin lispro (HUMALOG) injection vial 0-8 Units, 0-8 Units, SubCUTAneous, TID WC, Kale Bear DO, 6 Units at 02/15/23 0841    insulin lispro (HUMALOG) injection vial 0-4 Units, 0-4 Units, SubCUTAneous, Nightly, Kale Bear DO    perflutren lipid microspheres (DEFINITY) injection 1.5 mL, 1.5 mL, IntraVENous, ONCE PRN, Jarret Bear DO    vancomycin (VANCOCIN) 1,000 mg in sodium chloride 0.9 % 250 mL IVPB (Fdan1Mts), 1,000 mg, IntraVENous, Q24H, Kale Bear DO, Last Rate: 250 mL/hr at 02/15/23 1219, 1,000 mg at 02/15/23 1219    sodium chloride flush 0.9 % injection 10 mL, 10 mL, IntraVENous, 2 times per day, Fran Najjar, MD, 10 mL at 02/14/23 2031    sodium chloride flush 0.9 % injection 10 mL, 10 mL, IntraVENous, PRN, Fran Najjar, MD    0.9 % sodium chloride infusion, , IntraVENous, PRN, Fran Najjar, MD    enoxaparin (LOVENOX) injection 40 mg, 40 mg, SubCUTAneous, Daily, Fran Najjar, MD, 40 mg at 02/15/23 0841    promethazine (PHENERGAN) tablet 12.5 mg, 12.5 mg, Oral, Q6H PRN **OR** ondansetron (ZOFRAN) injection 4 mg, 4 mg, IntraVENous, Q6H PRN, Devika Larson MD    polyethylene glycol (GLYCOLAX) packet 17 g, 17 g, Oral, Daily PRN, Devika Larson MD    acetaminophen (TYLENOL) tablet 650 mg, 650 mg, Oral, Q6H PRN **OR** acetaminophen (TYLENOL) suppository 650 mg, 650 mg, Rectal, Q6H PRN, Devika Larson MD    cefTRIAXone (ROCEPHIN) 1,000 mg in sterile water 10 mL IV syringe, 1,000 mg, IntraVENous, Q24H, Devika Larson MD, 1,000 mg at 02/15/23 0557    doxycycline (VIBRAMYCIN) 100 mg in sodium chloride 0.9 % 100 mL IVPB (Pzjt1Nit), 100 mg, IntraVENous, Q12H, Devika Larson MD, Stopped at 02/15/23 0701    dexamethasone (DECADRON) tablet 6 mg, 6 mg, Oral, Daily, Devika Larson MD, 6 mg at 02/15/23 9938    aspirin EC tablet 81 mg, 81 mg, Oral, Daily, Devika Larson MD, 81 mg at 02/15/23 0834    rosuvastatin (CRESTOR) tablet 5 mg, 5 mg, Oral, Daily, Devika Larson MD, 5 mg at 02/14/23 1356    amLODIPine (NORVASC) tablet 5 mg, 5 mg, Oral, Daily, Devika Larson MD, 5 mg at 02/15/23 7831    levothyroxine (SYNTHROID) tablet 88 mcg, 88 mcg, Oral, Daily, Devika Larson MD, 88 mcg at 02/15/23 0557    atenolol (TENORMIN) tablet 25 mg, 25 mg, Oral, BID, Devika Larson MD, 25 mg at 02/15/23 0834    donepezil (ARICEPT) tablet 5 mg, 5 mg, Oral, Nightly, Devika Larson MD, 5 mg at 02/14/23 2031    glucose chewable tablet 16 g, 4 tablet, Oral, PRN, Devika Larson MD    dextrose bolus 10% 125 mL, 125 mL, IntraVENous, PRN **OR** dextrose bolus 10% 250 mL, 250 mL, IntraVENous, PRN, Devika Larson MD    glucagon (rDNA) injection 1 mg, 1 mg, SubCUTAneous, PRN, Devika Larson MD    dextrose 10 % infusion, , IntraVENous, Continuous PRN, Devika Larson MD  DIAGNOSTIC RESULTS   Radiology:  CT ABDOMEN PELVIS WO CONTRAST Additional Contrast? None    Result Date: 2/14/2023  EXAMINATION: CT OF THE ABDOMEN AND PELVIS WITHOUT CONTRAST 2/14/2023 1:25 am TECHNIQUE: CT of the abdomen and pelvis was performed without the administration of intravenous contrast. Multiplanar reformatted images are provided for review. Automated exposure control, iterative reconstruction, and/or weight based adjustment of the mA/kV was utilized to reduce the radiation dose to as low as reasonably achievable. COMPARISON: None. HISTORY: ORDERING SYSTEM PROVIDED HISTORY: elevated lipase TECHNOLOGIST PROVIDED HISTORY: Additional Contrast?->None Reason for exam:->elevated lipase Decision Support Exception - unselect if not a suspected or confirmed emergency medical condition->Emergency Medical Condition (MA) FINDINGS: Lower Chest: No cardiomegaly. Coronary artery atherosclerosis. Aortic valvular calcifications are identified. No distal esophageal thickening is seen. Multilobar patchy ground-glass infiltrates are seen within the lungs bilaterally. Mitral annular calcifications are noted. Organs: Fatty liver infiltration. No adrenal mass is identified. No pancreatic mass. No peripancreatic inflammatory process. Low-attenuation benign-appearing cysts noted within the right kidney. No right-sided or left-sided renal calculi. No hydroureteronephrosis is seen. GI/Bowel: No acute inflammatory bowel process, ileus or obstruction. Colonic diverticulosis is noted. Pelvis: No pelvic mass. The bladder is unremarkable. No free fluid in the pelvis. No pelvic lymphadenopathy. Hysterectomy. Peritoneum/Retroperitoneum: Aorto iliac atherosclerosis. No retroperitoneal or mesenteric lymphadenopathy. No bowel herniation is seen. Bones/Soft Tissues: Postoperative changes seen related to right hip arthroplasty. Degenerative changes at the pubic symphysis, left hip joint, SI joint bilaterally as well as the spine. Greatest disc disease noted at the lumbosacral junction. Moderate multilevel spinal canal stenosis. Foraminal narrowing seen within the lumbar spine as well, greatest at L4-L5. 1. Fatty liver infiltration.  2. Patchy ground-glass parenchymal infiltrates are seen within the lung bases bilaterally suspicious for multilobar pneumonia, such as viral pneumonia. Could consider correlation with COVID testing given appearance. 3. Diffuse atherosclerosis including coronary artery involvement. 4. Other nonacute findings within the abdomen and pelvis as above. XR CHEST PORTABLE    Result Date: 2/13/2023  EXAMINATION: ONE XRAY VIEW OF THE CHEST 2/13/2023 9:52 pm COMPARISON: None. HISTORY: ORDERING SYSTEM PROVIDED HISTORY: Cough, COVID TECHNOLOGIST PROVIDED HISTORY: Reason for exam:->Cough, COVID FINDINGS: Cardiac size is enlarged. Patchy ground-glass infiltrates are seen throughout the lungs bilaterally, greatest in the right mid lung and both lung bases. Small left-sided pleural effusion suspected. No pneumothorax. Degenerative changes in the shoulders and spine. No acute osseous fracture is identified. Aortic atherosclerosis. Patchy ground-glass infiltrates seen within the lungs bilaterally suspicious for multilobar pneumonia, with a possible small left effusion. Imaging and labs were reviewed per medical records. Thank you for involving me in the care of Gena Ta I will continue to follow. Please do not hesitate to call [phone] for any questions or concerns.     Electronically signed by Marvin Lopez MD on 2/15/2023 at 12:47 PM

## 2023-02-15 NOTE — PROGRESS NOTES
20 G IV catheter not intact upon removal. Site negative to palpation upon request by Dr. Amita Nielson. No new orders.

## 2023-02-15 NOTE — ACP (ADVANCE CARE PLANNING)
Advance Care Planning     Advance Care Planning Activator (Inpatient)  Conversation Note      Date of ACP Conversation: 2/15/2023     Conversation Conducted with: Sammi Mckeon (spouse)    ACP Activator: Markus Nguyen. Freedom Dimas, [address] [place] [address] Decision Maker:     Current Designated Health Care Decision Maker:     Primary Decision Maker: Boubacar Crawford - Spouse - [phone]    Secondary Decision Maker: shane hilario - Child - [phone]    Supplemental (Other) Decision Maker: Ramsey Cavazosief - Brother/Sister - [phone]  Click here to complete Healthcare Decision Makers including section of the Healthcare Decision Maker Relationship (ie \"Primary\")      Care Preferences    Ventilation: \"If you were in your present state of health and suddenly became very ill and were unable to breathe on your own, what would your preference be about the use of a ventilator (breathing machine) if it were available to you? \"      Would the patient desire the use of ventilator (breathing machine)?: no    \"If your health worsens and it becomes clear that your chance of recovery is unlikely, what would your preference be about the use of a ventilator (breathing machine) if it were available to you? \"     Would the patient desire the use of ventilator (breathing machine)?: No      Resuscitation  \"CPR works best to restart the heart when there is a sudden event, like a heart attack, in someone who is otherwise healthy. Unfortunately, CPR does not typically restart the heart for people who have serious health conditions or who are very sick. \"    \"In the event your heart stopped as a result of an underlying serious health condition, would you want attempts to be made to restart your heart (answer \"yes\" for attempt to resuscitate) or would you prefer a natural death (answer \"no\" for do not attempt to resuscitate)? \" no       [x] Yes   [] No   Educated Patient / Cheryle Bane regarding differences between Advance Directives and portable DNR orders.     Length of ACP Conversation in minutes:      Conversation Outcomes:  [x] ACP discussion completed  [] Existing advance directive reviewed with patient; no changes to patient's previously recorded wishes  [] New Advance Directive completed  [] Portable Do Not Rescitate prepared for Provider review and signature  [] POLST/POST/MOLST/MOST prepared for Provider review and signature      Follow-up plan:    [] Schedule follow-up conversation to continue planning  [] Referred individual to Provider for additional questions/concerns   [] Advised patient/agent/surrogate to review completed ACP document and update if needed with changes in condition, patient preferences or care setting    [] This note routed to one or more involved healthcare providers

## 2023-02-15 NOTE — PROGRESS NOTES
Occupational Therapy  OCCUPATIONAL THERAPY INITIAL EVALUATION     Polly Trejo [facility]  [facility]         Date:2/15/2023                                                   Patient Name: Nicki Garrett     MRN: [id number]     : 1940     Room: [address] [place] [address]       Evaluating OT: Savage Rodrigues, OTR/L; [id number]       Referring Provider and Orders/Date:    OT eval and treat  Start:  23 1600,   End:  23 1600,   ONE TIME,   Standing Count:  1 Occurrences,   R         Kale Bear,         Diagnosis:   1. COVID-19    2. Pneumonia of both lungs due to infectious organism, unspecified part of lung    3. Hyperglycemia    4. Hypoxia    5.  Dehydration         Surgery: none      Pertinent Medical History: Dementia        Past Medical History:   Diagnosis Date    Acquired hypothyroidism 2017    Combined fat and carbohydrate induced hyperlipemia 10/22/2007    Diabetes mellitus ([facility] [address]     Essential hypertension 2017    Gout of multiple sites 2017          Past Surgical History:   Procedure Laterality Date    BACK SURGERY      HIP SURGERY Right     HYSTERECTOMY, TOTAL ABDOMINAL (CERVIX REMOVED)         Precautions:  Fall Risk, covid+ with droplet iso    Recommended placement: home with [facility]    Assessment of current deficits     [x] Functional mobility  [x]ADLs  [x] Strength               []Cognition     [x] Functional transfers   [x] IADLs         [x] Safety Awareness   [x]Endurance     [] Fine Coordination              [x] Balance      [] Vision/perception   []Sensation      [x]Gross Motor Coordination  [] ROM  [] Delirium                   [] Motor Control     OT PLAN OF CARE   OT POC based on physician orders, patient diagnosis and results of clinical assessment    Frequency/Duration 1-3 days/wk for 2 weeks PRN   Specific OT Treatment Interventions to include:   * Instruction/training on adapted ADL techniques and AE recommendations to increase functional independence within precautions       * Training on energy conservation strategies, correct breathing pattern and techniques to improve independence/tolerance for self-care routine  * Functional transfer/mobility training/DME recommendations for increased independence, safety, and fall prevention  * Patient/Family education to increase follow through with safety techniques and functional independence  * Recommendation of environmental modifications for increased safety with functional transfers/mobility and ADLs  * Therapeutic exercise to improve motor endurance, ROM, and functional strength for ADLs/functional transfers  * Therapeutic activities to facilitate/challenge dynamic balance, stand tolerance for increased safety and independence with ADLs  * Therapeutic activities to facilitate gross/fine motor skills for increased independence with ADLs  * Neuro-muscular re-education: facilitation of righting/equilibrium reactions, midline orientation, scapular stability/mobility, normalization of muscle tone, and facilitation of volitional active controled movement  * Positioning to improve skin integrity, interaction with environment and functional independence     Recommended Adaptive Equipment/DME: TBD      Home Living: Pt lives at home with spouse who can provide 24/7 assist. Split level, but only stays on first floor with daughter in staying in the basement. 2 steps to enter from garage and then up 6 steps with rails.      Bathroom setup: Walk in shower with shower chair    DME owned: walker, cane     Prior Level of Function: indep with ADLs , indep with IADLs; ambulated mod indep with walker   Driving: yes   Occupation: retired   Enjoys: watching TV, time with spouse, crossword puzzles    Pain Level: none  Cognition: A&O: 4/4; Follows 2 step directions   Memory:  fair with pt ordering toast and coffee during session and within a few minutes, asking if she could order it again due to forgetting   Sequencing:  fair+   Problem solving:  fair   Judgement/safety:  fair- with decreased awareness of deficits    AM-[facility] Daily Activity - Inpatient   How much help is needed for putting on and taking off regular lower body clothing?: A Lot  How much help is needed for bathing (which includes washing, rinsing, drying)?: A Lot  How much help is needed for toileting (which includes using toilet, bedpan, or urinal)?: A Little  How much help is needed for putting on and taking off regular upper body clothing?: A Lot  How much help is needed for taking care of personal grooming?: A Little  How much help for eating meals?: None  AM-[facility] Inpatient Daily Activity Raw Score: 16  AM-[facility] Inpatient ADL T-Scale Score : 35.96  ADL Inpatient CMS 0-100% Score: 53.32  ADL Inpatient CMS G-Code Modifier : CK    Functional Assessment:     Initial Eval Status  Date: 2/15/2023   Treatment Status  Date: STGs = LTGs  Time frame: 10-14 days   Feeding Independent  NA-PLOF   Grooming Stand by Assist standing at the sink for oral care, face/hand wash and hair comb with pt leaning on sink and with decreased walker safety  Independent    UB Dressing Moderate Assist with gown management from standing  Independent    LB Dressing Moderate Assist with socks and undergarments from sitting/standing  Independent    Bathing Moderate Assist for safety and due to overall fatigue reported  Modified Swain    Toileting Stand by Assist for walker and line management  Modified Swain    Bed Mobility  Supine to sit: Minimal Assist   Sit to supine: min Assist  Supine to sit: Independent   Sit to supine: Independent    Functional Transfers Minimal Assist with walker from bed and toilet  Modified Swain    Functional Mobility Minimal Assist with walker but decreased safety with walker and running into items for short distance functional mobility throughout the room to simulate house hold distances.     Modified Swain Balance Sitting:     Static:  fair+    Dynamic:fair  Standing: fair-  Sitting:     Static:  good    Dynamic:fair+  Standing: fair   Activity Tolerance Vitals with activity: room air 92% HR 69; 91% HR 71; sitting EOB for 11min and standing 3min    Increase standing tolerance for >4min with stable vital signs for carry over into toileting, functional tranfers and indep in ADLs   Visual/  Perceptual Glasses: none; WFL    Reports change in vision since admission: No-has implants     NA     Hand Dominance  [x] Right  [] Left    AROM (PROM) Strength Additional Info:  Goal:   RUE  WFL 4/5 good  and  FMC/dexterity noted during ADL tasks  Opposition [x] Intact [] Impaired  Finger to nose [x] Intact [] Impaired 5/5MMT generally for carry over into self care, functional transfers and functional mobility with AD. LUE WFL 4/5 good  and  FMC/dexterity noted during ADL tasks  Opposition [x] Intact [] Impaired  Finger to nose [x] Intact [] Impaired 5/5MMT generally for carry over into self care, functional transfers and functional mobility with AD. Hearing: WFL   Sensation:  No c/o numbness or tingling   Tone: WFL   Edema: none    Comments: Upon arrival patient supine. Pt required mod A for most UB ADLs and max A LB ADLs tasks. Limited with min A for standing during LB ADLs and functional transfers. The biggest barriers reflect that of functional transfers, functional mobility, UB/LB ADLs, cognition, activity tolerance, balance, safety and strengthening. At end of session, patient sitting EOB with call light and phone within reach, all lines and tubes intact. Overall patient demonstrated decreased independence and safety during completion of ADL/functional transfer/mobility tasks compared to PLOF.  Nursing updated on pt position and status following OT eval. Pt would benefit from continued skilled OT to increase safety and independence with completion of ADL/IADL tasks for functional independence and quality of life.    Treatment: OT treatment provided this date includes:  Instruction, education and training on safe facilitation and adapted techniques for completion of ADLs. These include neuromuscular reeducation to facilitate balance/righting reactions,safe functional transfer techniques, proper positioning/alignment to improve interaction with environment and overall function and on adapted techniques/work simplification for completion of ADLs. Education provided on hand/feet placement with bed rails, walker, toilet and body mechanics for fall prevention. Cues for energy conservation and safety for in the home at DC, including modifications and DME. Extended time to complete all tasks, including skilled monitoring of patient's response during treatment session and vital signs. Prior to and at the end of session, environmental modifications / line management completed for patients safety and efficiency of treatment session. See above for further details. Rehab Potential: Good for established goals     Patient / Family Goal: [place] home    Patient and/or family were instructed on functional diagnosis, prognosis/goals and OT plan of care. Demonstrated good understanding. Eval Complexity: Low  History: Brief review of medical records and additional review of physical, cognitive, or psychosocial history related to current functional performance  Exam: 3+ performance deficits  Assistance/Modification: Mod assistance or modifications required to perform tasks. May have comorbidities that affect occupational performance.     Time In: 0915  Time Out: 2828  Total Treatment Time: 12    Min Units   OT Eval Low 97165  x  1   OT Eval Medium 30985      OT Eval High 12257      OT Re-Eval [id number]       Therapeutic Ex 09900       Therapeutic Activities 20094  12 1    ADL/Self Care 46919       Orthotic Management 31470       Manual 99701     Neuro Re-Ed 60695       Non-Billable Time          Evaluation Time additionally includes thorough review of current medical information, gathering information on past medical history/social history and prior level of function, interpretation of standardized testing/informal observation of tasks, assessment of data and development of plan of care and goals.             Taisha Berry OTR/L; [id number]

## 2023-02-15 NOTE — PROGRESS NOTES
Physical Therapy    Physical Therapy Initial Evaluation/Plan of Care    Room #:  0635/0635-01  Patient Name: Zandra Navarro  YOB: 1940  MRN: [id number]    Date of Service: 2/15/2023     Tentative placement recommendation: Subacute Rehab  Equipment recommendation:  To be determined      Evaluating Physical Therapist: Yesica Grandchild, PT  #[id number]      Specific Provider Orders/Date/Referring Provider :  02/14/23 1600    PT eval and treat  Start:  02/14/23 1600,   End:  02/14/23 1600,   ONE TIME,   Standing Count:  1 Occurrences,   R         Kale Bear DO     Admitting Diagnosis:   Dehydration [E86.0]  Hyperglycemia [R73.9]  Hypoxia [R09.02]  Pneumonia of both lungs due to infectious organism, unspecified part of lung [J18.9]  Pneumonia due to COVID-19 virus [U07.1, J12.82]  COVID-19 [U07.1]    Admitted with    cough and fatigue, shortness of breath   Surgery: none  Visit Diagnoses         Codes    COVID-19    -  Primary U07.1    Pneumonia of both lungs due to infectious organism, unspecified part of lung     J18.9    Hyperglycemia     R73.9    Hypoxia     R09.02    Dehydration     E86.0            Patient Active Problem List   Diagnosis    Combined fat and carbohydrate induced hyperlipemia    Essential hypertension    Gout of multiple sites    Acquired hypothyroidism    Trochanteric bursitis, right hip    Lumbar spondylosis    Spinal stenosis of lumbar region    Type 2 diabetes mellitus    Alzheimer's disease, unspecified    Stage 3b chronic kidney disease ([facility] [address]    Type 2 diabetes mellitus with chronic kidney disease    Pneumonia due to COVID-19 virus    Acute kidney injury ([facility] [address]    Gram-positive bacteremia        ASSESSMENT of Current Deficits Patient exhibits decreased strength, balance, endurance, and pain bilateral upper ext d/t walker usage  impairing functional mobility, transfers, gait , gait distance, and tolerance to activity are barriers to d/c and require skilled intervention to address concerns listed above to increase safety and independence at discharge. Decreased strength, balance and endurance  increases patient's risk for fall. Patient is mildly unsteady with gait during session with no loss of balance , dizziness, or shortness of breath  Patient with difficulty oxygenating impairing endurance and functional independence         PHYSICAL THERAPY  PLAN OF CARE       Physical therapy plan of care is established based on physician order,  patient diagnosis and clinical assessment    Current Treatment Recommendations:    -Bed Mobility: Lower extremity exercises , Upper extremity exercises , and Trunk control activities   -Standing Balance: Perform strengthening exercises in standing to promote motor control with or without upper extremity support   -Transfers: Provide instruction on proper hand and foot position for adequate transfer of weight onto lower extremities and use of gait device if needed and Cues for hand placement, technique and safety. Provide stabilization to prevent fall   -Gait: Gait training and Standing activities to improve: base of support, weight shift, weight bearing    -Endurance: Utilize Supervised activities to increase level of endurance to allow for safe functional mobility including transfers and gait     PT long term treatment goals are located in below grid    Patient and or family understand(s) diagnosis, prognosis, and plan of care. Frequency of treatments: Patient will be seen  daily.          Prior Level of Function: Patient ambulated independently, with wheeled walker    Rehab Potential: good   for baseline    Past medical history:   Past Medical History:   Diagnosis Date    Acquired hypothyroidism 9/18/2017    Combined fat and carbohydrate induced hyperlipemia 10/22/2007    Diabetes mellitus ([facility] [place] [address]     Essential hypertension 9/18/2017    Gout of multiple sites 9/18/2017     Past Surgical History:   Procedure Laterality Date    BACK SURGERY      HIP SURGERY Right     HYSTERECTOMY, TOTAL ABDOMINAL (CERVIX REMOVED)         SUBJECTIVE:    Precautions: Bedrest with bathroom Privileges  and Check Pulse Oximetry while ambulating , falls, alarm, and Droplet plus/COVID-19 ,      Social history: Patient lives with spouse in a two story home resides first  with  2+6 steps  to enter home. With rail  Walk in shower        Equipment owned: Carlin Rubio, and Shower chair,       AM-PAC Basic Mobility        AM-PAC Basic Mobility - Inpatient   How much help is needed turning from your back to your side while in a flat bed without using bedrails?: A Little  How much help is needed moving from lying on your back to sitting on the side of a flat bed without using bedrails?: A Little  How much help is needed moving to and from a bed to a chair?: A Little  How much help is needed standing up from a chair using your arms?: A Little  How much help is needed walking in hospital room?: A Little  How much help is needed climbing 3-5 steps with a railing?: A Lot  AM-PAC Inpatient Mobility Raw Score : 17  AM-PAC Inpatient T-Scale Score : 42.13  Mobility Inpatient CMS 0-100% Score: 50.57  Mobility Inpatient CMS G-Code Modifier : CK    Nursing cleared patient for PT evaluation. The admitting diagnosis and active problem list as listed above have been reviewed prior to the initiation of this evaluation. OBJECTIVE;   Initial Evaluation  Date: 2/15/2023 Treatment Date:     Short Term/ Long Term   Goals   Was pt agreeable to Eval/treatment? Yes  To be met in 3 days   Pain level   5/10  bilateral upper extremities      Bed Mobility    Rolling: Minimal assist of 1    Supine to sit: Minimal assist of 1    Sit to supine: Minimal assist of 1    Scooting: Minimal assist of 1    Rolling: Independent    Supine to sit:  Independent    Sit to supine: Independent    Scooting: Independent     Transfers Sit to stand: Minimal assist of 1 x 3 reps with cuing for hand placement  Sit to stand: Independent     Ambulation     2x50 feet using  wheeled walker with Minimal assist of 1   for walker approximation, upright, multiplane instability, and safety and cues for walker approximation, safety, proper hand placement, and pacing    75 feet using  wheeled walker with Independent    Stair negotiation: ascended and descended   Not assessed, 5 steps, 1 rail     5 steps, 1 rail, Supervision       ROM Within functional limits    Increase range of motion 10% of affected joints    Strength BUE:  refer to OT eval  RLE:  3+/5  LLE:  3+/5  Increase strength in affected mm groups by 1/3 grade   Balance Sitting EOB:  fair    Dynamic Standing:  fair  with wheeled walker   Sitting EOB:  good    Dynamic Standing: good       Patient is Alert & Oriented x person, place, time, and situation and follows one step directions  Short term memory deficit   Sensation:  Patient  denies numbness/tingling   Edema:  yes bilateral lower extremities   Endurance: poor      Vitals: room air   Blood Pressure at rest  Blood Pressure during session    Heart Rate at rest 63 Heart Rate during session 79   SPO2 at rest 93%  SPO2 during session 90%     Patient education  Patient educated on role of Physical Therapy, risks of immobility, safety and plan of care, importance of positional changes for oxygen exchange,  importance of mobility while in hospital , and proper use/technique of incentive spirometer     Patient response to education:   Pt verbalized understanding Pt demonstrated skill Pt requires further education in this area   Yes Partial Yes      Treatment:  Patient practiced and was instructed/facilitated in the following treatment: Patient   Sat edge of bed 5 minutes with Supervision  to increase dynamic sitting balance and activity tolerance. seated and standing challenges      Therapeutic Exercises:  ankle pumps, long arc quad, and shoulder elevation  standing x 10 reps.        At end of session, patient in bed with alarm call light and phone within reach,  all lines and tubes intact, nursing notified. Patient would benefit from continued skilled Physical Therapy to improve functional independence and quality of life. Patient's/ family goals   home    Time in   26  Time out  327    Total Treatment Time  16 minutes    Evaluation time includes thorough review of current medical information, gathering information on past medical history/social history and prior level of function, completion of standardized testing/informal observation of tasks, assessment of data, and development of Plan of care and goals.      CPT codes:  Low Complexity PT evaluation (48136)  Therapeutic exercises (22964)   16 minutes  1 unit(s)    Nuria Granger, PT

## 2023-02-16 LAB
ALBUMIN SERPL-MCNC: 3.3 G/DL (ref 3.5–5.2)
ALP BLD-CCNC: 58 U/L (ref 35–104)
ALT SERPL-CCNC: 21 U/L (ref 0–32)
ANION GAP SERPL CALCULATED.3IONS-SCNC: 10 MMOL/L (ref 7–16)
AST SERPL-CCNC: 23 U/L (ref 0–31)
BASOPHILS ABSOLUTE: 0.01 E9/L (ref 0–0.2)
BASOPHILS RELATIVE PERCENT: 0.1 % (ref 0–2)
BILIRUB SERPL-MCNC: 0.3 MG/DL (ref 0–1.2)
BUN BLDV-MCNC: 33 MG/DL (ref 6–23)
CALCIUM SERPL-MCNC: 9.1 MG/DL (ref 8.6–10.2)
CHLORIDE BLD-SCNC: 105 MMOL/L (ref 98–107)
CO2: 21 MMOL/L (ref 22–29)
CREAT SERPL-MCNC: 1.1 MG/DL (ref 0.5–1)
EOSINOPHILS ABSOLUTE: 0 E9/L (ref 0.05–0.5)
EOSINOPHILS RELATIVE PERCENT: 0 % (ref 0–6)
GFR SERPL CREATININE-BSD FRML MDRD: 50 ML/MIN/1.73
GLUCOSE BLD-MCNC: 300 MG/DL (ref 74–99)
HCT VFR BLD CALC: 37.5 % (ref 34–48)
HEMOGLOBIN: 12.4 G/DL (ref 11.5–15.5)
IMMATURE GRANULOCYTES #: 0.15 E9/L
IMMATURE GRANULOCYTES %: 1.4 % (ref 0–5)
LV EF: 58 %
LVEF MODALITY: NORMAL
LYMPHOCYTES ABSOLUTE: 0.62 E9/L (ref 1.5–4)
LYMPHOCYTES RELATIVE PERCENT: 5.7 % (ref 20–42)
MCH RBC QN AUTO: 27.4 PG (ref 26–35)
MCHC RBC AUTO-ENTMCNC: 33.1 % (ref 32–34.5)
MCV RBC AUTO: 82.8 FL (ref 80–99.9)
METER GLUCOSE: 272 MG/DL (ref 74–99)
METER GLUCOSE: 273 MG/DL (ref 74–99)
METER GLUCOSE: 291 MG/DL (ref 74–99)
METER GLUCOSE: 304 MG/DL (ref 74–99)
MONOCYTES ABSOLUTE: 0.58 E9/L (ref 0.1–0.95)
MONOCYTES RELATIVE PERCENT: 5.4 % (ref 2–12)
NEUTROPHILS ABSOLUTE: 9.48 E9/L (ref 1.8–7.3)
NEUTROPHILS RELATIVE PERCENT: 87.4 % (ref 43–80)
PDW BLD-RTO: 13.5 FL (ref 11.5–15)
PLATELET # BLD: 325 E9/L (ref 130–450)
PMV BLD AUTO: 10.9 FL (ref 7–12)
POTASSIUM SERPL-SCNC: 4.6 MMOL/L (ref 3.5–5)
RBC # BLD: 4.53 E12/L (ref 3.5–5.5)
SODIUM BLD-SCNC: 136 MMOL/L (ref 132–146)
TOTAL PROTEIN: 6.9 G/DL (ref 6.4–8.3)
WBC # BLD: 10.8 E9/L (ref 4.5–11.5)

## 2023-02-16 PROCEDURE — 6360000002 HC RX W HCPCS: Performed by: INTERNAL MEDICINE

## 2023-02-16 PROCEDURE — 6370000000 HC RX 637 (ALT 250 FOR IP): Performed by: INTERNAL MEDICINE

## 2023-02-16 PROCEDURE — 80053 COMPREHEN METABOLIC PANEL: CPT

## 2023-02-16 PROCEDURE — 97110 THERAPEUTIC EXERCISES: CPT

## 2023-02-16 PROCEDURE — 2500000003 HC RX 250 WO HCPCS: Performed by: INTERNAL MEDICINE

## 2023-02-16 PROCEDURE — 1200000000 HC SEMI PRIVATE

## 2023-02-16 PROCEDURE — 85025 COMPLETE CBC W/AUTO DIFF WBC: CPT

## 2023-02-16 PROCEDURE — 82962 GLUCOSE BLOOD TEST: CPT

## 2023-02-16 PROCEDURE — 97116 GAIT TRAINING THERAPY: CPT

## 2023-02-16 PROCEDURE — 36415 COLL VENOUS BLD VENIPUNCTURE: CPT

## 2023-02-16 PROCEDURE — 97530 THERAPEUTIC ACTIVITIES: CPT

## 2023-02-16 PROCEDURE — 93306 TTE W/DOPPLER COMPLETE: CPT

## 2023-02-16 PROCEDURE — 2580000003 HC RX 258: Performed by: INTERNAL MEDICINE

## 2023-02-16 PROCEDURE — 87040 BLOOD CULTURE FOR BACTERIA: CPT

## 2023-02-16 PROCEDURE — 99232 SBSQ HOSP IP/OBS MODERATE 35: CPT | Performed by: INTERNAL MEDICINE

## 2023-02-16 RX ORDER — INSULIN LISPRO 100 [IU]/ML
7 INJECTION, SOLUTION INTRAVENOUS; SUBCUTANEOUS
Status: DISCONTINUED | OUTPATIENT
Start: 2023-02-16 | End: 2023-02-17 | Stop reason: HOSPADM

## 2023-02-16 RX ORDER — INSULIN GLARGINE 100 [IU]/ML
20 INJECTION, SOLUTION SUBCUTANEOUS NIGHTLY
Status: DISCONTINUED | OUTPATIENT
Start: 2023-02-16 | End: 2023-02-17 | Stop reason: HOSPADM

## 2023-02-16 RX ADMIN — INSULIN LISPRO 7 UNITS: 100 INJECTION, SOLUTION INTRAVENOUS; SUBCUTANEOUS at 18:43

## 2023-02-16 RX ADMIN — ENOXAPARIN SODIUM 40 MG: 100 INJECTION SUBCUTANEOUS at 08:38

## 2023-02-16 RX ADMIN — DEXAMETHASONE 6 MG: 6 TABLET ORAL at 08:38

## 2023-02-16 RX ADMIN — INSULIN LISPRO 6 UNITS: 100 INJECTION, SOLUTION INTRAVENOUS; SUBCUTANEOUS at 08:47

## 2023-02-16 RX ADMIN — INSULIN LISPRO 4 UNITS: 100 INJECTION, SOLUTION INTRAVENOUS; SUBCUTANEOUS at 12:05

## 2023-02-16 RX ADMIN — ROSUVASTATIN CALCIUM 5 MG: 5 TABLET, FILM COATED ORAL at 09:38

## 2023-02-16 RX ADMIN — INSULIN LISPRO 5 UNITS: 100 INJECTION, SOLUTION INTRAVENOUS; SUBCUTANEOUS at 13:06

## 2023-02-16 RX ADMIN — INSULIN LISPRO 5 UNITS: 100 INJECTION, SOLUTION INTRAVENOUS; SUBCUTANEOUS at 09:55

## 2023-02-16 RX ADMIN — Medication 10 ML: at 09:55

## 2023-02-16 RX ADMIN — ATENOLOL 25 MG: 50 TABLET ORAL at 17:44

## 2023-02-16 RX ADMIN — LEVOTHYROXINE SODIUM 88 MCG: 0.09 TABLET ORAL at 06:04

## 2023-02-16 RX ADMIN — AMLODIPINE BESYLATE 5 MG: 5 TABLET ORAL at 08:38

## 2023-02-16 RX ADMIN — DONEPEZIL HYDROCHLORIDE 5 MG: 5 TABLET, FILM COATED ORAL at 20:10

## 2023-02-16 RX ADMIN — CEFTRIAXONE 1000 MG: 1 INJECTION, POWDER, FOR SOLUTION INTRAMUSCULAR; INTRAVENOUS at 06:05

## 2023-02-16 RX ADMIN — ASPIRIN 81 MG: 81 TABLET, COATED ORAL at 08:38

## 2023-02-16 RX ADMIN — Medication 10 ML: at 20:44

## 2023-02-16 RX ADMIN — DOXYCYCLINE 100 MG: 100 INJECTION, POWDER, LYOPHILIZED, FOR SOLUTION INTRAVENOUS at 06:19

## 2023-02-16 RX ADMIN — INSULIN GLARGINE 20 UNITS: 100 INJECTION, SOLUTION SUBCUTANEOUS at 20:44

## 2023-02-16 RX ADMIN — INSULIN LISPRO 6 UNITS: 100 INJECTION, SOLUTION INTRAVENOUS; SUBCUTANEOUS at 17:45

## 2023-02-16 RX ADMIN — ATENOLOL 25 MG: 50 TABLET ORAL at 08:38

## 2023-02-16 NOTE — PROGRESS NOTES
[address] [place] [address] Hospitalist   Progress Note    Admitting Date and Time: 2/13/2023  9:38 PM  Admit Dx: Dehydration [E86.0]  Hyperglycemia [R73.9]  Hypoxia [R09.02]  Pneumonia of both lungs due to infectious organism, unspecified part of lung [J18.9]  Pneumonia due to COVID-19 virus [U07.1, J12.82]  COVID-19 [U07.1]    Subjective/interval history:    2/14: Pt admitted with COVID-19 pneumonia and possible mild acute kidney injury on chronic kidney disease. States she has felt mildly ill, but family was concerned and felt it was best to go to the ED. Labs significant for creatinine of 1.4 with most recent available 1.3 in November 2022 noted during PCP office visit. Chest x-ray in the ED consistent with viral pneumonia, possible small left pleural effusion. CT of the abdomen pelvis was done for elevated lipase which did not show any acute abdominal process, and views of the left lower lung did not show pleural effusion but rather multifocal infiltrates consistent with COVID-pneumonia. Today she feels fatigued, but otherwise denies specific complaints. She does have a mild dry cough during encounter. 2/15: Sitting up in bed, states she feels better overall. Creatinine has improved to 1.1. Blood glucose persistently elevated, which is likely steroid-induced hyperglycemia. Blood cultures are showing 2 sets positive for gram-positive cocci in clusters. 2/16: Patient sleeping but awakens easily to voice. Feels fatigued but otherwise no new complaints. Breathing comfortably on room air. Blood culture preliminary results showing coagulase-negative staph and micrococcus, probable contaminant. Results do not specify the draw site for each culture bottle. ROS: denies fever, chills, cp, sob, n/v, HA unless stated above.      insulin lispro  0-8 Units SubCUTAneous TID WC    insulin lispro  0-4 Units SubCUTAneous Nightly    vancomycin  1,000 mg IntraVENous Q24H    insulin glargine  18 Units SubCUTAneous Nightly    insulin lispro  5 Units SubCUTAneous TID     sodium chloride flush  10 mL IntraVENous 2 times per day    enoxaparin  40 mg SubCUTAneous Daily    cefTRIAXone (ROCEPHIN) IV  1,000 mg IntraVENous Q24H    doxycycline (VIBRAMYCIN) IV  100 mg IntraVENous Q12H    dexamethasone  6 mg Oral Daily    aspirin  81 mg Oral Daily    rosuvastatin  5 mg Oral Daily    amLODIPine  5 mg Oral Daily    levothyroxine  88 mcg Oral Daily    atenolol  25 mg Oral BID    donepezil  5 mg Oral Nightly     perflutren lipid microspheres, 1.5 mL, ONCE PRN  sodium chloride flush, 10 mL, PRN  sodium chloride, , PRN  promethazine, 12.5 mg, Q6H PRN   Or  ondansetron, 4 mg, Q6H PRN  polyethylene glycol, 17 g, Daily PRN  acetaminophen, 650 mg, Q6H PRN   Or  acetaminophen, 650 mg, Q6H PRN  glucose, 4 tablet, PRN  dextrose bolus, 125 mL, PRN   Or  dextrose bolus, 250 mL, PRN  glucagon (rDNA), 1 mg, PRN  dextrose, , Continuous PRN       Objective:    /79   Pulse 60   Temp 97.6 °F (36.4 °C)   Resp 15   Ht 5' 2\" (1.575 m)   Wt 180 lb (81.6 kg)   SpO2 96%   BMI 32.92 kg/m²   General Appearance: alert and oriented to person, place and time and in no acute distress  Skin: warm and dry  Head: normocephalic and atraumatic  Eyes: pupils equal, round, and reactive to light, extraocular eye movements intact, conjunctivae normal  ENT: Oral mucosa moist  Neck: neck supple and non tender without mass   Pulmonary/Chest: Nonlabored on room air.   Coarse breath sounds bilaterally  Cardiovascular: normal rate, normal S1 and S2 and no carotid bruits  Abdomen: soft, non-tender, non-distended, normal bowel sounds, no masses or organomegaly  Extremities: no cyanosis, no clubbing and no edema  Neurologic: no cranial nerve deficit and speech normal      Recent Labs     02/13/23  2146 02/15/23  1022 02/15/23  1347 02/16/23  0624   * 136  --  136   K 4.6 4.3  --  4.6   CL 93* 104  --  105   CO2 23 19*  --  21*   BUN 49* 32*  --  33* CREATININE 1.4* 1.1* 1.1* 1.1*   GLUCOSE 343* 387*  --  300*   CALCIUM 9.6 8.7  --  9.1         Recent Labs     02/13/23  2146 02/15/23  1022 02/16/23  0624   ALKPHOS 59 50 58   PROT 7.7 6.6 6.9   LABALBU 3.5 3.0* 3.3*   BILITOT 0.4 0.3 0.3   AST 32* 17 23   ALT 27 18 21         Recent Labs     02/13/23  2146 02/15/23  1022 02/16/23  0624   WBC 9.1 7.2 10.8   RBC 5.25 4.50 4.53   HGB 14.6 12.0 12.4   HCT 44.0 38.0 37.5   MCV 83.8 84.4 82.8   MCH 27.8 26.7 27.4   MCHC 33.2 31.6* 33.1   RDW 13.8 13.6 13.5    291 325   MPV 11.2 10.9 10.9           Radiology:   CT ABDOMEN PELVIS WO CONTRAST Additional Contrast? None   Final Result   1. Fatty liver infiltration. 2. Patchy ground-glass parenchymal infiltrates are seen within the lung bases   bilaterally suspicious for multilobar pneumonia, such as viral pneumonia. Could consider correlation with COVID testing given appearance. 3. Diffuse atherosclerosis including coronary artery involvement. 4. Other nonacute findings within the abdomen and pelvis as above. XR CHEST PORTABLE   Final Result   Patchy ground-glass infiltrates seen within the lungs bilaterally suspicious   for multilobar pneumonia, with a possible small left effusion. Assessment and Plan:  Principal Problem:    Pneumonia due to COVID-19 virus  Active Problems:    Stage 3b chronic kidney disease ([address]    Type 2 diabetes mellitus with chronic kidney disease    Acute kidney injury ([address]    Gram-positive bacteremia    Essential hypertension    Acquired hypothyroidism  Resolved Problems:    * No resolved hospital problems. *      COVID-19 pneumonia  -Continue dexamethasone 6 mg p.o. daily day #3  -Antibiotics discontinued per ID recommendation    2. Gram positive bacteremia vs contaminant  -Vancomycin discontinued per ID  -Repeat blood cultures drawn today  -Echocardiogram completed with result pending  -ID following     2.   Acute kidney injury superimposed on stage IIIb chronic kidney disease  -Creatinine now stable at 1.1 since IV fluids discontinued    3. Uncontrolled type 2 diabetes mellitus now with steroid-induced hyperglycemia  -Hemoglobin A1c 12.2 indicating poor outpatient control. Goal for her age and comorbidities would be around 8  -Increase Lantus to 20 units nightly  -Increase Humalog to 7 units 3 times daily with meals plus corrective scale    4. Hypothyroidism  -Continue levothyroxine  -TSH mildly elevated, but will hold off on adjusting levothyroxine given she is acutely ill. Recommend repeat TSH as an outpatient after acute illness has resolved    5. Primary hypertension  -Continue atenolol and amlodipine    6. Dyslipidemia  -Continue rosuvastatin    7. Alzheimer dementia  -Continue donepezil    DVT prophylaxis: Enoxaparin  CODE STATUS: Full code    Disposition: Possible discharge home tomorrow if final blood culture results are consistent with contaminant and echocardiogram without vegetation    NOTE: This report was transcribed using voice recognition software. Every effort was made to ensure accuracy; however, inadvertent computerized transcription errors may be present.      Electronically signed by Dain Delarosa DO on 2/16/2023 at 8:07 AM

## 2023-02-16 NOTE — PROGRESS NOTES
Pharmacy Consultation Note  (Antibiotic Dosing and Monitoring)    Vancomycin has been discontinued; pharmacy will sign-off. Please reconsult if needed.     Thank you,  Carlie Centeno, [facility], [facility].,2/56/6411 12:26 PM

## 2023-02-16 NOTE — PROGRESS NOTES
Physical Therapy    Physical Therapy Treatment Note/Plan of Care    Room #:  0635/0635-01  Patient Name: Mona Jones  YOB: 1940  MRN: [id number]    Date of Service: 2/16/2023     Tentative placement recommendation: Subacute unless patient meets goals then Home Health Physical Therapy  Equipment recommendation:  To be determined      Evaluating Physical Therapist: Quintin Linderr, PT  #[id number]      Specific Provider Orders/Date/Referring Provider :  02/14/23 1600    PT eval and treat  Start:  02/14/23 1600,   End:  02/14/23 1600,   ONE TIME,   Standing Count:  1 Occurrences,   R         Kale Bear,      Admitting Diagnosis:   Dehydration [E86.0]  Hyperglycemia [R73.9]  Hypoxia [R09.02]  Pneumonia of both lungs due to infectious organism, unspecified part of lung [J18.9]  Pneumonia due to COVID-19 virus [U07.1, J12.82]  COVID-19 [U07.1]    Admitted with    cough and fatigue, shortness of breath   Surgery: none  Visit Diagnoses         Codes    COVID-19    -  Primary U07.1    Pneumonia of both lungs due to infectious organism, unspecified part of lung     J18.9    Hyperglycemia     R73.9    Hypoxia     R09.02    Dehydration     E86.0            Patient Active Problem List   Diagnosis    Combined fat and carbohydrate induced hyperlipemia    Essential hypertension    Gout of multiple sites    Acquired hypothyroidism    Trochanteric bursitis, right hip    Lumbar spondylosis    Spinal stenosis of lumbar region    Type 2 diabetes mellitus    Alzheimer's disease, unspecified    Stage 3b chronic kidney disease ([facility] [address]    Type 2 diabetes mellitus with chronic kidney disease    Pneumonia due to COVID-19 virus    Acute kidney injury ([facility] [address]    Gram-positive bacteremia        ASSESSMENT of Current Deficits Patient exhibits decreased strength, balance, endurance, and pain bilateral upper ext d/t walker usage  impairing functional mobility, transfers, gait , gait distance, and tolerance to activity are barriers to d/c and require skilled intervention to address concerns listed above to increase safety and independence at discharge. Decreased strength, balance and endurance  increases patient's risk for fall. Patient tolerates ambulation in room and stair training. Patient performed seated exercises and incentive spirometer. Education on the role of home health therapy and physical therapy in general as well as proper positioning to prevent pneumonia. Patient on room air during session and was short of breath with activity. Rest breaks as needed throughout. Patient would benefit from further therapy to address above deficits and  risk of falls. PHYSICAL THERAPY  PLAN OF CARE       Physical therapy plan of care is established based on physician order,  patient diagnosis and clinical assessment    Current Treatment Recommendations:    -Bed Mobility: Lower extremity exercises , Upper extremity exercises , and Trunk control activities   -Standing Balance: Perform strengthening exercises in standing to promote motor control with or without upper extremity support   -Transfers: Provide instruction on proper hand and foot position for adequate transfer of weight onto lower extremities and use of gait device if needed and Cues for hand placement, technique and safety. Provide stabilization to prevent fall   -Gait: Gait training and Standing activities to improve: base of support, weight shift, weight bearing    -Endurance: Utilize Supervised activities to increase level of endurance to allow for safe functional mobility including transfers and gait     PT long term treatment goals are located in below grid    Patient and or family understand(s) diagnosis, prognosis, and plan of care. Frequency of treatments: Patient will be seen  daily.          Prior Level of Function: Patient ambulated independently, with wheeled walker    Rehab Potential: good   for baseline    Past medical history:   Past Medical History:   Diagnosis Date    Acquired hypothyroidism 9/18/2017    Combined fat and carbohydrate induced hyperlipemia 10/22/2007    Diabetes mellitus ([facility]     Essential hypertension 9/18/2017    Gout of multiple sites 9/18/2017     Past Surgical History:   Procedure Laterality Date    BACK SURGERY      HIP SURGERY Right     HYSTERECTOMY, TOTAL ABDOMINAL (CERVIX REMOVED)         SUBJECTIVE:    Precautions: Bedrest with bathroom Privileges  and Check Pulse Oximetry while ambulating , falls, alarm, and Droplet plus/COVID-19 ,      Social history: Patient lives with spouse in a two story home resides first  with  2+6 steps  to enter home. With rail  Walk in shower        Equipment owned: Carla Beltrán, and Shower chair,       AM-PAC Basic Mobility        AM-PAC Basic Mobility - Inpatient   How much help is needed turning from your back to your side while in a flat bed without using bedrails?: A Little  How much help is needed moving from lying on your back to sitting on the side of a flat bed without using bedrails?: A Little  How much help is needed moving to and from a bed to a chair?: A Little  How much help is needed standing up from a chair using your arms?: A Little  How much help is needed walking in hospital room?: A Little  How much help is needed climbing 3-5 steps with a railing?: A Little  AM-[facility] Inpatient Mobility Raw Score : 18  AM-PAC Inpatient T-Scale Score : 43.63  Mobility Inpatient CMS 0-100% Score: 46.58  Mobility Inpatient CMS G-Code Modifier : CK    Nursing cleared patient for PT treatment. OBJECTIVE;   Initial Evaluation  Date: 2/15/2023 Treatment Date:   2/16/2023    Short Term/ Long Term   Goals   Was pt agreeable to Eval/treatment?  Yes yes To be met in 3 days   Pain level   5/10  bilateral upper extremities  No number assigned to pain    Bed Mobility    Rolling: Minimal assist of 1    Supine to sit: Minimal assist of 1    Sit to supine: Minimal assist of 1    Scooting: Minimal assist of 1   Rolling: Supervision    Supine to sit: Supervision    Sit to supine: Not assessed patient in chair   Scooting: Supervision     Rolling: Independent    Supine to sit:  Independent    Sit to supine: Independent    Scooting: Independent     Transfers Sit to stand: Minimal assist of 1 x 3 reps with cuing for hand placement Sit to stand: Supervision  cues for hand placement  x multiple reps Sit to stand: Independent     Ambulation     2x50 feet using  wheeled walker with Minimal assist of 1   for walker approximation, upright, multiplane instability, and safety and cues for walker approximation, safety, proper hand placement, and pacing 35 feet, 40 feet using  wheeled walker with Supervision    cues for upright posture, safety, and pacing     75 feet using  wheeled walker with Independent    Stair negotiation: ascended and descended   Not assessed, 5 steps, 1 rail  5 steps with bilateral support and supervision, cues for sequencing and safety     5 steps, 1 rail, Supervision       ROM Within functional limits    Increase range of motion 10% of affected joints    Strength BUE:  refer to OT eval  RLE:  3+/5  LLE:  3+/5  Increase strength in affected mm groups by 1/3 grade   Balance Sitting EOB:  fair    Dynamic Standing:  fair  with wheeled walker  Sitting EOB: fair +  Dynamic Standing: fair with wheeled walker   Sitting EOB:  good    Dynamic Standing: good       Patient is Alert & Oriented x person, place, time, and situation and follows directions  Short term memory deficit   Sensation:  Patient  denies numbness/tingling   Edema:  yes bilateral lower extremities   Endurance: fair       Vitals: room air   Blood Pressure at rest  Blood Pressure during session    Heart Rate at rest  Heart Rate during session    SPO2 at rest %  SPO2 during session %     Patient education  Patient educated on role of Physical Therapy, risks of immobility, safety and plan of care, energy conservation, importance of positional changes for oxygen exchange,  importance of mobility while in hospital , safety , stair training , proper use/technique of incentive spirometer, and seated exercises      Patient response to education:   Pt verbalized understanding Pt demonstrated skill Pt requires further education in this area   Yes Partial Yes      Treatment:  Patient practiced and was instructed/facilitated in the following treatment: Patient transferred to edge of bed and stood to amb in room. Seated rest break with education on stair training. Patient performed stairs and took rest. Patient amb in room and back to bedside chair. Patient performed seated exercises and incentive spirometer. Therapeutic Exercises:  ankle pumps, long arc quad, and seated marching x 10-15 reps. At end of session, patient in chair with spouse present call light and phone within reach,  all lines and tubes intact, nursing notified. Patient would benefit from continued skilled Physical Therapy to improve functional independence and quality of life.          Patient's/ family goals   home    Time in   1035  Time out  1120    Total Treatment Time  45 minutes    CPT codes:  Therapeutic activities (60553)   20 minutes  1 unit(s)  Therapeutic exercises (10049)   15 minutes  1 unit(s)  Gait Training (16387) 10 minutes 1 unit(s)    Janet Benson, PTA    #[id number]

## 2023-02-16 NOTE — PROGRESS NOTES
[address] [place] [address] [facility]  Progress Note    CC: COVID, Bacteremia   Face to face encounter   SUBJECTIVE:  Patient is tolerating medications. No reported adverse drug reactions. ROS: No nausea, vomiting, diarrhea. On room air. Has been afebrile. Sitting up in chair   Feeling  better     Medications:  Scheduled Meds:   insulin lispro  0-8 Units SubCUTAneous TID WC    insulin lispro  0-4 Units SubCUTAneous Nightly    vancomycin  1,000 mg IntraVENous Q24H    insulin glargine  18 Units SubCUTAneous Nightly    insulin lispro  5 Units SubCUTAneous TID WC    sodium chloride flush  10 mL IntraVENous 2 times per day    enoxaparin  40 mg SubCUTAneous Daily    cefTRIAXone (ROCEPHIN) IV  1,000 mg IntraVENous Q24H    doxycycline (VIBRAMYCIN) IV  100 mg IntraVENous Q12H    dexamethasone  6 mg Oral Daily    aspirin  81 mg Oral Daily    rosuvastatin  5 mg Oral Daily    amLODIPine  5 mg Oral Daily    levothyroxine  88 mcg Oral Daily    atenolol  25 mg Oral BID    donepezil  5 mg Oral Nightly     Continuous Infusions:   sodium chloride      dextrose       PRN Meds:perflutren lipid microspheres, sodium chloride flush, sodium chloride, promethazine **OR** ondansetron, polyethylene glycol, acetaminophen **OR** acetaminophen, glucose, dextrose bolus **OR** dextrose bolus, glucagon (rDNA), dextrose  OBJECTIVE:  Patient Vitals for the past 24 hrs:   BP Temp Temp src Pulse Resp SpO2   02/16/23 0600 129/79 97.6 °F (36.4 °C) -- 60 15 96 %   02/15/23 2030 114/60 97.8 °F (36.6 °C) -- 73 16 94 %   02/15/23 1640 128/76 97.8 °F (36.6 °C) Oral 70 18 92 %     Constitutional: The patient is awake, alert, and oriented. Sitting up in chair. Skin: Warm and dry. No rashes were noted. Head: Eyes show round, and reactive pupils. No jaundice. Mouth: Moist mucous membranes, no ulcerations, no thrush. Neck: Supple to movements. No lymphadenopathy. Chest: No use of accessory muscles to breathe. Symmetrical expansion. Auscultation reveals no wheezing, crackles, or rhonchi. Diminished- on room air. Cardiovascular: S1 and S2 are rhythmic and regular. Abdomen: Positive bowel sounds to auscultation. Benign to palpation. Extremities: No clubbing, no cyanosis, no edema. PIV    Laboratory and Tests Review:  Lab Results   Component Value Date    WBC 10.8 02/16/2023    WBC 7.2 02/15/2023    WBC 9.1 02/13/2023    HGB 12.4 02/16/2023    HCT 37.5 02/16/2023    MCV 82.8 02/16/2023     02/16/2023     Lab Results   Component Value Date    NEUTROABS 9.48 (H) 02/16/2023    NEUTROABS 6.70 02/15/2023    NEUTROABS 6.89 02/13/2023     Lab Results   Component Value Date    CRP 5.7 (H) 02/15/2023     Lab Results   Component Value Date    SEDRATE 93 (H) 02/15/2023     Lab Results   Component Value Date    ALT 21 02/16/2023    AST 23 02/16/2023    ALKPHOS 58 02/16/2023    BILITOT 0.3 02/16/2023     Lab Results   Component Value Date/Time     02/16/2023 06:24 AM    K 4.6 02/16/2023 06:24 AM     02/16/2023 06:24 AM    CO2 21 02/16/2023 06:24 AM    BUN 33 02/16/2023 06:24 AM    CREATININE 1.1 02/16/2023 06:24 AM    GFRAA 44 08/01/2022 09:03 AM    LABGLOM 50 02/16/2023 06:24 AM    GLUCOSE 300 02/16/2023 06:24 AM    PROT 6.9 02/16/2023 06:24 AM    LABALBU 3.3 02/16/2023 06:24 AM    CALCIUM 9.1 02/16/2023 06:24 AM    BILITOT 0.3 02/16/2023 06:24 AM    ALKPHOS 58 02/16/2023 06:24 AM    AST 23 02/16/2023 06:24 AM    ALT 21 02/16/2023 06:24 AM     Radiology:  CT A/P   Impression:       1. Fatty liver infiltration. 2. Patchy ground-glass parenchymal infiltrates are seen within the lung bases   bilaterally suspicious for multilobar pneumonia, such as viral pneumonia. Could consider correlation with COVID testing given appearance. 3. Diffuse atherosclerosis including coronary artery involvement.    4. Other nonacute findings within the abdomen and pelvis as above     Microbiology:   2/13/2023-blood cx- staph species, micrococcus    ASSESSMENT:  COVID - on room air. Bacteremia- staph species, micrococcus species - contaminant     PLAN:  Currently on Vancomycin IV-will stop  Repeat blood cultures pending   ECHO pending   Stop doxycycline   Stop rocephin   Check cultures  Monitor labs  Patient can d/c from ID POV    RAFFY Musa - CNS  11:46 AM  2/16/2023   Pt seen and examined. Above discussed agree with advanced practice nurse. Labs, cultures, and radiographs reviewed. Face to Face encounter occurred. Changes made as necessary.      Kalyn Moore MD

## 2023-02-16 NOTE — CARE COORDINATION
SOCIAL WORK / DISCHARGE PLANNING:   COVID positive. Sw attempted to call room, pt answered but wanted to sleep, no family present at bedside. Pt with dementia. Sw called pt's spouse, reviewed PT eval and recommendation for [facility]. He states he does not think pt would do well in [facility] and would not need one. Dtr resides in duplex nearby and will be available to assist pt. PTA pt used [facility], has access to walker and rollater. Sw discussed HHC as an option and spouse was agreeable. He states pt used when she had hip surgery but could not recall provider and said he would use [facility]. Referral called to [address] [place] [address] , current start of care next Thurs. WILL NEED HHC ORDERS PRIOR TO DC.              Electronically signed by ANNIE Bird on 2/16/2023 at 2:58 PM

## 2023-02-16 NOTE — HOME CARE
[address] [facility] referral received, awaiting final orders. Spoke with patient's spouse Camilo Escalante and verified demographics. Will follow. Lan Arthur, ULISSES [address] [facility].

## 2023-02-17 ENCOUNTER — TELEPHONE (OUTPATIENT)
Dept: FAMILY MEDICINE CLINIC | Age: 83
End: 2023-02-17

## 2023-02-17 ENCOUNTER — TELEPHONE (OUTPATIENT)
Dept: ADMINISTRATIVE | Age: 83
End: 2023-02-17

## 2023-02-17 VITALS
SYSTOLIC BLOOD PRESSURE: 130 MMHG | DIASTOLIC BLOOD PRESSURE: 64 MMHG | TEMPERATURE: 97.6 F | RESPIRATION RATE: 18 BRPM | OXYGEN SATURATION: 96 % | HEIGHT: 62 IN | HEART RATE: 50 BPM | BODY MASS INDEX: 33.13 KG/M2 | WEIGHT: 180 LBS

## 2023-02-17 LAB
BLOOD CULTURE, ROUTINE: NORMAL
CULTURE, BLOOD 2: NORMAL
METER GLUCOSE: 223 MG/DL (ref 74–99)
METER GLUCOSE: 256 MG/DL (ref 74–99)

## 2023-02-17 PROCEDURE — 6360000002 HC RX W HCPCS: Performed by: INTERNAL MEDICINE

## 2023-02-17 PROCEDURE — 6370000000 HC RX 637 (ALT 250 FOR IP): Performed by: INTERNAL MEDICINE

## 2023-02-17 PROCEDURE — 82962 GLUCOSE BLOOD TEST: CPT

## 2023-02-17 PROCEDURE — 2580000003 HC RX 258: Performed by: INTERNAL MEDICINE

## 2023-02-17 PROCEDURE — 99239 HOSP IP/OBS DSCHRG MGMT >30: CPT | Performed by: INTERNAL MEDICINE

## 2023-02-17 RX ADMIN — INSULIN LISPRO 7 UNITS: 100 INJECTION, SOLUTION INTRAVENOUS; SUBCUTANEOUS at 08:45

## 2023-02-17 RX ADMIN — ATENOLOL 25 MG: 50 TABLET ORAL at 08:11

## 2023-02-17 RX ADMIN — ENOXAPARIN SODIUM 40 MG: 100 INJECTION SUBCUTANEOUS at 08:12

## 2023-02-17 RX ADMIN — AMLODIPINE BESYLATE 5 MG: 5 TABLET ORAL at 08:12

## 2023-02-17 RX ADMIN — DEXAMETHASONE 6 MG: 6 TABLET ORAL at 08:12

## 2023-02-17 RX ADMIN — ROSUVASTATIN CALCIUM 5 MG: 5 TABLET, FILM COATED ORAL at 09:07

## 2023-02-17 RX ADMIN — LEVOTHYROXINE SODIUM 88 MCG: 0.09 TABLET ORAL at 05:34

## 2023-02-17 RX ADMIN — Medication 10 ML: at 09:07

## 2023-02-17 RX ADMIN — ASPIRIN 81 MG: 81 TABLET, COATED ORAL at 08:11

## 2023-02-17 RX ADMIN — INSULIN LISPRO 2 UNITS: 100 INJECTION, SOLUTION INTRAVENOUS; SUBCUTANEOUS at 08:13

## 2023-02-17 NOTE — CARE COORDINATION
SOCIAL WORK / DISCHARGE PLANNING:   Pt discharged this am prior to Sw being able to connect with pt/family. Dtr provided home going transport. [facility] PT/OT order in Epic. Sw called [facility] rep left message to notify of dc. The Plan for Transition of Care is related to the following treatment goals: [facility]     The Patient and/or patient representative spouse  was provided with a choice of provider and agrees   with the discharge plan. [x] Yes [] No  Sw spoke with spouse via phone and reviewed choices. [address] [place] [address] of choice list was provided with basic dialogue that supports the patient's individualized plan of care/goals, treatment preferences and shares the quality data associated with the providers.  [x] Yes [] No        Electronically signed by ANNIE Tolbert on 2/17/2023 at 9:41 AM

## 2023-02-17 NOTE — TELEPHONE ENCOUNTER
[facility] called. She is asking that Dr. Sal Whyte look at the pt ECHO that was done on 2.13.2023. and if Dr. Fallon Watts pt seen they can see her at the [facility] clinic in [place]. Please advise.   Last seen 12/9/2022  Next appt 2/23/2023  Radha [phone]

## 2023-02-17 NOTE — DISCHARGE INSTRUCTIONS
Learning About Coronavirus [phone])  What is coronavirus (COVID-19)? COVID-19 is a disease caused by a type of coronavirus. This illness was first found in December 2019. It has since spread worldwide. Coronaviruses are a large group of viruses. They cause the common cold. They also cause more serious illnesses like [place] respiratory syndrome (MERS) and severe acute respiratory syndrome (SARS). COVID-19 is caused by a novel coronavirus. That means it's a new type that has not been seen in people before. What are the symptoms? COVID-19 symptoms may include:  Fever. Cough. Trouble breathing. Chills or repeated shaking with chills. Muscle and body aches. Headache. Sore throat. New loss of taste or smell. Vomiting. Diarrhea. In severe cases, COVID-19 can cause pneumonia and make it hard to breathe without help from a machine. It can cause death. How is it diagnosed? COVID-19 is diagnosed with a viral test. This may also be called a PCR test or antigen test. It looks for evidence of the virus in your breathing passages or lungs (respiratory system). The test is most often done on a sample from the nose, throat, or lungs. It's sometimes done on a sample of saliva. One way a sample is collected is by putting a long swab into the back of your nose. If you have questions about COVID-19 testing, ask your doctor or go to cdc.gov to use the COVID-19 Viral Testing Tool. How is it treated? Mild cases of COVID-19 can be treated at home. Serious cases need treatment in the hospital. Treatment may include medicines, plus breathing support such as oxygen therapy or a ventilator. Some people may be placed on their belly to help their oxygen levels. Treatments that may help people who have COVID-19 include:  Antiviral medicines. These medicines treat viral infections. Immune-based therapy. These medicines help the immune system fight COVID-19. Examples include monoclonal antibodies.   Blood thinners. These medicines help prevent blood clots. People with severe illness are at risk for blood clots. How can you protect yourself and others? How can you protect yourself and others from COVID-19? Stay up to date on your COVID-19 vaccines. Avoid sick people, and stay away from others if you are sick. Keep some physical distance between yourself and other people. Avoid crowds, especially indoors. Wear a mask with the best fit, protection, and comfort for you. A mask can help protect you even when others aren't wearing one. Get tested for COVID-19 before you have an indoor visit with people who don't live with you. Improve airflow. If you have to spend time indoors with others, open windows and doors. Or you can use a fan to blow air away from people and out a window. Choose outdoor visits and activities when possible. Cover your mouth with a tissue when you cough or sneeze. Wash your hands often. Avoid touching your mouth, nose, and eyes. Here are some other steps you may need to take. If you were exposed to someone with COVID-19 AND you don't have symptoms:  For at least 10 full days, wear a high-quality mask when you are around other people, even those you live with. Get tested. Do it right away if you develop symptoms. Wait at least 5 days after you were exposed if you don't have symptoms. If your test is positive, call your doctor right away. The doctor may have you take a medicine to keep you from getting seriously ill. Treatment works best when started early. And isolate right away. Take extra care if you have to be around other people who are at high risk of getting seriously ill from COVID-19. Keep some extra space between yourself and others, for example. And don't travel. Watch for symptoms. If you are sick or test positive for COVID-19:   Talk to your doctor as soon as you can. Your doctor may have you take medicine to help prevent serious illness.   Get a COVID-19 test unless you have already been tested. You may need to be tested more than once. Stay home and separate yourself from others, including those you live with. Limit contact with people in your home. If possible, stay in a separate bedroom and use a separate bathroom. For at least 10 full days, anytime you're around other people, you and they should wear a high-quality mask. Children younger than 3years old don't need to wear a mask. Self-isolate until it's safe to be around others again. (Important: Day 0 is the day your symptoms started or the day you tested positive. Day 1 is the day after your symptoms first started or your test was positive.)  If you tested positive but had no symptoms, it's safe to end isolation at the end of Day 5. But if you start to have symptoms, follow the recommendations below and count your first day of symptoms as Day 0. If you have symptoms, when you can end isolation depends on how sick you were and your overall health. No matter what, you need to wait until your symptoms are getting better and you haven't had a fever for 24 hours while not taking medicines to lower the fever. Here's how long to isolate, based on your symptoms:  If you were only a little sick: (This means you might have felt really bad but had no shortness of breath and never needed to be in the hospital.) You can end isolation at the end of Day 5. If you were more sick: (You had some shortness of breath or some trouble breathing but never needed to be in the hospital.) You can end isolation at the end of Day 10. If you were very sick and needed to be in the hospital, or if you have a weakened immune system: You can end isolation at the end of Day 10 or later. Talk to your doctor to find out when it's safe to end isolation. You may need a viral test.  After you end isolation, if your symptoms come back or get worse: Restart your isolation at Day 0. Do this even if it happens after you took medicine for COVID.   Avoid travel and stay away from people at high risk for serious disease for at least 10 days. Check the CDC website at cdc.gov for the most current information on how to protect yourself. How can you self-isolate when you have COVID-19? If you have COVID-19, there are things you can do to help avoid spreading the virus to others. Stay home, and avoid contact with other people. Limit contact with people in your home. If possible, stay in a separate bedroom and use a separate bathroom. Wear a high-quality mask when you are around other people. Improve airflow. If you have to spend time indoors with others, open windows and doors. Or you can use a fan to blow air away from people and out a window. Avoid contact with pets and other animals. Cover your mouth and nose with a tissue when you cough or sneeze. Then throw it in the trash right away. Wash your hands often, especially after you cough or sneeze. Use soap and water, and scrub for at least 20 seconds. If soap and water aren't available, use an alcohol-based hand . Don't share personal household items. These include bedding, towels, cups and glasses, and eating utensils. [address] in the warmest water allowed for the fabric type, and dry it completely. It's okay to wash other people's laundry with yours. Clean and disinfect your home. Use household  and disinfectant wipes or sprays. Go to the [facility] website at cdc.gov if you have questions. When should you call for help? Call 911 anytime you think you may need emergency care. For example, call if you have life-threatening symptoms, such as: You have severe trouble breathing. (You can't talk at all.)     You have constant chest pain or pressure. You are severely dizzy or lightheaded. You are confused or can't think clearly. You have pale, gray, or blue-colored skin or lips. You pass out (lose consciousness) or are very hard to wake up.      You have loss of balance or trouble walking. You have trouble seeing out of one or both eyes. You have weakness or drooping on one side of the face. You have weakness or numbness in an arm or a leg. You have trouble speaking. You have a severe headache. You have a seizure. Call your doctor now or seek immediate medical care if:    You have moderate trouble breathing. (You can't speak a full sentence.)     You are coughing up blood. You have signs of low blood pressure. These include feeling lightheaded; being too weak to stand; and having cold, pale, clammy skin. Watch closely for changes in your health, and be sure to contact your doctor if:    Your symptoms get worse. You are not getting better as expected. You have new or worse symptoms of anxiety, depression, nightmares, or flashbacks. Call before you go to the doctor's office. Follow their instructions. And wear a mask. Where can you learn more? Go to http://www.woods.com/ and enter C008 to learn more about \"Learning About Coronavirus (COVID-19). \"  Current as of: October 28, 2022               Content Version: 13.5  © 4750-6133 [employer]. Care instructions adapted under license by [facility] ([facility] [place]). If you have questions about a medical condition or this instruction, always ask your healthcare professional. [place] [address] any warranty or liability for your use of this information.

## 2023-02-17 NOTE — DISCHARGE SUMMARY
[facility] Physician Discharge Summary       Asim [address] [facility]  [address]  [place] [phone]    Follow up        Activity level: As tolerated    Diet: ADULT DIET; Regular; 3 carb choices (45 gm/meal); Low Fat/Low Chol/High Fiber/2 gm Na; 1800 ml    Labs: Per primary care physician    Condition at discharge: Stable    Dispo: Home      Patient ID:  Luna Solano  [id number]  80 y.o.  1940    Admit date: 2/13/2023    Discharge date and time:  2/17/2023  8:09 AM    Admission Diagnoses: Principal Problem:    Pneumonia due to COVID-19 virus  Active Problems:    Stage 3b chronic kidney disease ([facility] [address]    Type 2 diabetes mellitus with chronic kidney disease    Acute kidney injury ([facility] [address]    Positive blood culture    Essential hypertension    Acquired hypothyroidism  Resolved Problems:    * No resolved hospital problems. *      Discharge Diagnoses: Principal Problem:    Pneumonia due to COVID-19 virus  Active Problems:    Stage 3b chronic kidney disease ([address]    Type 2 diabetes mellitus with chronic kidney disease    Acute kidney injury ([facility] [address]    Positive blood culture    Essential hypertension    Acquired hypothyroidism  Resolved Problems:    * No resolved hospital problems. *      Consults:  IP CONSULT TO INFECTIOUS DISEASES    Procedures: None    Hospital Course: This is a an 49-year-old female with a history significant for stage IIIb chronic kidney disease, diabetes mellitus, hypothyroidism, primary hypertension, dyslipidemia, and mild Alzheimer dementia. She was admitted to the hospital with COVID-19 and acute kidney injury due to dehydration. She was treated with IV fluids and creatinine returned to baseline. She was started on dexamethasone for COVID, however she was not hypoxic and she experienced significant steroid-induced hyperglycemia on top of diabetes mellitus of dexamethasone discontinued on discharge.   Patient was found to have 2 blood cultures that were positive for coagulase-negative staph and micrococcus species. Echocardiogram did not show any evidence of vegetation. Repeat cultures with no growth to date. She was started on IV vancomycin when blood culture result was initially available, however discontinued per ID recommendation. Likely contaminant. Stable for discharge home with close outpatient follow-up in 2/17/2023. Discharge Exam:  Vitals:    02/16/23 0600 02/16/23 1530 02/16/23 2000 02/17/23 0530   BP: 129/79 133/76 139/61 130/64   Pulse: 60 57 87 50   Resp: 15 16 22 18   Temp: 97.6 °F (36.4 °C) 97.3 °F (36.3 °C) 97.5 °F (36.4 °C) 97.6 °F (36.4 °C)   TempSrc:       SpO2: 96% 94% 93% 96%   Weight:       Height:           General Appearance: alert and oriented to person, place and time and in no acute distress  Skin: warm and dry  Head: normocephalic and atraumatic  Eyes: pupils equal, round, and reactive to light, extraocular eye movements intact, conjunctivae normal  ENT: Oral mucosa moist  Neck: neck supple and non tender without mass   Pulmonary/Chest: Nonlabored on room air. Coarse breath sounds bilaterally  Cardiovascular: normal rate, normal S1 and S2 and no carotid bruits  Abdomen: soft, non-tender, non-distended, normal bowel sounds, no masses or organomegaly  Extremities: no cyanosis, no clubbing and no edema  Neurologic: no cranial nerve deficit and speech normal  I/O last 3 completed shifts: In: 360 [P.O.:360]  Out: -   No intake/output data recorded.       LABS:  Recent Labs     02/15/23  1022 02/15/23  1347 02/16/23  0624     --  136   K 4.3  --  4.6     --  105   CO2 19*  --  21*   BUN 32*  --  33*   CREATININE 1.1* 1.1* 1.1*   GLUCOSE 387*  --  300*   CALCIUM 8.7  --  9.1       Recent Labs     02/15/23  1022 02/16/23  0624   WBC 7.2 10.8   RBC 4.50 4.53   HGB 12.0 12.4   HCT 38.0 37.5   MCV 84.4 82.8   MCH 26.7 27.4   MCHC 31.6* 33.1   RDW 13.6 13.5    325   MPV 10.9 10.9 Imaging:  CT ABDOMEN PELVIS WO CONTRAST Additional Contrast? None    Result Date: 2/14/2023  EXAMINATION: CT OF THE ABDOMEN AND PELVIS WITHOUT CONTRAST 2/14/2023 1:25 am TECHNIQUE: CT of the abdomen and pelvis was performed without the administration of intravenous contrast. Multiplanar reformatted images are provided for review. Automated exposure control, iterative reconstruction, and/or weight based adjustment of the mA/kV was utilized to reduce the radiation dose to as low as reasonably achievable. COMPARISON: None. HISTORY: ORDERING SYSTEM PROVIDED HISTORY: elevated lipase TECHNOLOGIST PROVIDED HISTORY: Additional Contrast?->None Reason for exam:->elevated lipase Decision Support Exception - unselect if not a suspected or confirmed emergency medical condition->Emergency Medical Condition (MA) FINDINGS: Lower Chest: No cardiomegaly. Coronary artery atherosclerosis. Aortic valvular calcifications are identified. No distal esophageal thickening is seen. Multilobar patchy ground-glass infiltrates are seen within the lungs bilaterally. Mitral annular calcifications are noted. Organs: Fatty liver infiltration. No adrenal mass is identified. No pancreatic mass. No peripancreatic inflammatory process. Low-attenuation benign-appearing cysts noted within the right kidney. No right-sided or left-sided renal calculi. No hydroureteronephrosis is seen. GI/Bowel: No acute inflammatory bowel process, ileus or obstruction. Colonic diverticulosis is noted. Pelvis: No pelvic mass. The bladder is unremarkable. No free fluid in the pelvis. No pelvic lymphadenopathy. Hysterectomy. Peritoneum/Retroperitoneum: Aorto iliac atherosclerosis. No retroperitoneal or mesenteric lymphadenopathy. No bowel herniation is seen. Bones/Soft Tissues: Postoperative changes seen related to right hip arthroplasty. Degenerative changes at the pubic symphysis, left hip joint, SI joint bilaterally as well as the spine.   Greatest disc disease noted at the lumbosacral junction. Moderate multilevel spinal canal stenosis. Foraminal narrowing seen within the lumbar spine as well, greatest at L4-L5. 1. Fatty liver infiltration. 2. Patchy ground-glass parenchymal infiltrates are seen within the lung bases bilaterally suspicious for multilobar pneumonia, such as viral pneumonia. Could consider correlation with COVID testing given appearance. 3. Diffuse atherosclerosis including coronary artery involvement. 4. Other nonacute findings within the abdomen and pelvis as above. XR CHEST PORTABLE    Result Date: 2/13/2023  EXAMINATION: ONE XRAY VIEW OF THE CHEST 2/13/2023 9:52 pm COMPARISON: None. HISTORY: ORDERING SYSTEM PROVIDED HISTORY: Cough, COVID TECHNOLOGIST PROVIDED HISTORY: Reason for exam:->Cough, COVID FINDINGS: Cardiac size is enlarged. Patchy ground-glass infiltrates are seen throughout the lungs bilaterally, greatest in the right mid lung and both lung bases. Small left-sided pleural effusion suspected. No pneumothorax. Degenerative changes in the shoulders and spine. No acute osseous fracture is identified. Aortic atherosclerosis. Patchy ground-glass infiltrates seen within the lungs bilaterally suspicious for multilobar pneumonia, with a possible small left effusion. Patient Instructions:   Current Discharge Medication List        CONTINUE these medications which have NOT CHANGED    Details   !! levothyroxine (SYNTHROID) 88 MCG tablet Take 1 tablet by mouth Daily  Qty: 90 tablet, Refills: 1    Associated Diagnoses: Acquired hypothyroidism      atenolol (TENORMIN) 25 MG tablet Take 1 tablet by mouth in the morning and 1 tablet in the evening.   Qty: 180 tablet, Refills: 1    Associated Diagnoses: Essential hypertension      donepezil (ARICEPT) 5 MG tablet Take 1 tablet by mouth nightly  Qty: 90 tablet, Refills: 1    Associated Diagnoses: Alzheimer's disease, unspecified (CODE) (HCC)      metFORMIN (GLUCOPHAGE) 500 MG tablet Take 1 tablet by mouth 2 times daily (with meals)  Qty: 180 tablet, Refills: 1    Associated Diagnoses: Hyperglycemia      !! SYNTHROID 88 MCG tablet Take 1 tablet by mouth in the morning. Qty: 90 tablet, Refills: 1      amLODIPine (NORVASC) 5 MG tablet Take 1 tablet by mouth in the morning. Qty: 90 tablet, Refills: 1    Associated Diagnoses: Essential hypertension      Blood Glucose Calibration (QUICKTEK CONTROL SOLUTION) LIQD 1 each by In Vitro route daily Control solution for patient glucose meter to use daily  Qty: 3 each, Refills: 5    Associated Diagnoses: Type 2 diabetes mellitus without complication, without long-term current use of insulin ([facility])      BLOOD GLUCOSE MONITOR 1 each Use once daily      blood glucose monitor strips 1 strip by Other route daily Test 2 times a day & as needed for symptoms of irregular blood glucose. Dispense sufficient amount for indicated testing frequency plus additional to accommodate PRN testing needs. E11.9  Qty: 100 strip, Refills: 3    Associated Diagnoses: Hyperglycemia; Type 2 diabetes mellitus without complication, without long-term current use of insulin ([facility])      Lancets Thin MISC 1 each by Does not apply route daily E11.9  Qty: 100 each, Refills: 3    Associated Diagnoses: Type 2 diabetes mellitus without complication, without long-term current use of insulin ([facility])      blood glucose monitor kit and supplies Dispense sufficient amount for indicated testing frequency plus additional to accommodate PRN testing needs. Dispense all needed supplies to include: monitor, strips, lancing device, lancets, control solutions, alcohol swabs. Qty: 1 kit, Refills: 0    Comments: Brand per patient preference. May round up to next available package size.   Associated Diagnoses: Type 2 diabetes mellitus without complication, without long-term current use of insulin ([facility])      diclofenac sodium (VOLTAREN) 1 % GEL Apply 4 g topically 4 times daily  Qty: 350 g, Refills: 1 rosuvastatin (CRESTOR) 5 MG tablet TAKE ONE TABLET BY MOUTH EVERY DAY  Qty: 90 tablet, Refills: 1    Associated Diagnoses: Other hyperlipidemia      glipiZIDE (GLUCOTROL) 5 MG tablet Take 1 tablet by mouth daily  Qty: 180 tablet, Refills: 1    Associated Diagnoses: Hyperglycemia      diclofenac sodium (VOLTAREN) 1 % GEL Apply 4 g topically 4 times daily as needed (pain)  Qty: 480 g, Refills: 2       !! - Potential duplicate medications found. Please discuss with provider. STOP taking these medications       aspirin 81 MG EC tablet Comments:   Reason for Stopping:                 Note  that  31  minutes were spent in preparing discharge papers, discussing discharge with patient, medication review, etc.    NOTE: This report was transcribed using voice recognition software. Every effort was made to ensure accuracy; however, inadvertent computerized transcription errors may be present.      Signed:  Electronically signed by Francine Luo DO on 2/17/2023 at 8:09 AM

## 2023-02-17 NOTE — TELEPHONE ENCOUNTER
I called Dr. Garcia Resides office to notify him that Swatila Dulce showed up on the [facility] \"Echo Alert\" for Mod-sev AS & to see if he would like to her come to the [facility] for a consultation.   Office personnel said she will let him know & call me back

## 2023-02-17 NOTE — PROGRESS NOTES
[address] [place] [address] [facility]  Progress Note    CC: COVID, Bacteremia   Face to face encounter   SUBJECTIVE:  Patient is tolerating medications. No reported adverse drug reactions. ROS: No nausea, vomiting, diarrhea. On room air. Has been afebrile. Sitting up at bedside  Feeling  better - ready to go home. Medications:  Scheduled Meds:   insulin glargine  20 Units SubCUTAneous Nightly    insulin lispro  7 Units SubCUTAneous TID WC    insulin lispro  0-8 Units SubCUTAneous TID WC    insulin lispro  0-4 Units SubCUTAneous Nightly    sodium chloride flush  10 mL IntraVENous 2 times per day    enoxaparin  40 mg SubCUTAneous Daily    dexamethasone  6 mg Oral Daily    aspirin  81 mg Oral Daily    rosuvastatin  5 mg Oral Daily    amLODIPine  5 mg Oral Daily    levothyroxine  88 mcg Oral Daily    atenolol  25 mg Oral BID    donepezil  5 mg Oral Nightly     Continuous Infusions:   sodium chloride      dextrose       PRN Meds:perflutren lipid microspheres, sodium chloride flush, sodium chloride, promethazine **OR** ondansetron, polyethylene glycol, acetaminophen **OR** acetaminophen, glucose, dextrose bolus **OR** dextrose bolus, glucagon (rDNA), dextrose  OBJECTIVE:  Patient Vitals for the past 24 hrs:   BP Temp Pulse Resp SpO2   02/17/23 0530 130/64 97.6 °F (36.4 °C) 50 18 96 %   02/16/23 2000 139/61 97.5 °F (36.4 °C) 87 22 93 %   02/16/23 1530 133/76 97.3 °F (36.3 °C) 57 16 94 %       Constitutional: The patient is awake, alert, and oriented. Sitting up at bedside. Skin: Warm and dry. No rashes were noted. Head: Eyes show round, and reactive pupils. No jaundice. Mouth: Moist mucous membranes, no ulcerations, no thrush. Neck: Supple to movements. No lymphadenopathy. Chest: No use of accessory muscles to breathe. Symmetrical expansion. Auscultation reveals no wheezing, crackles, or rhonchi. Diminished- on room air. Cardiovascular: S1 and S2 are rhythmic and regular.    Abdomen: Positive bowel sounds to auscultation. Benign to palpation. Extremities: No clubbing, no cyanosis, no edema. PIV    Laboratory and Tests Review:  Lab Results   Component Value Date    WBC 10.8 02/16/2023    WBC 7.2 02/15/2023    WBC 9.1 02/13/2023    HGB 12.4 02/16/2023    HCT 37.5 02/16/2023    MCV 82.8 02/16/2023     02/16/2023     Lab Results   Component Value Date    NEUTROABS 9.48 (H) 02/16/2023    NEUTROABS 6.70 02/15/2023    NEUTROABS 6.89 02/13/2023     Lab Results   Component Value Date    CRP 5.7 (H) 02/15/2023     Lab Results   Component Value Date    SEDRATE 93 (H) 02/15/2023     Lab Results   Component Value Date    ALT 21 02/16/2023    AST 23 02/16/2023    ALKPHOS 58 02/16/2023    BILITOT 0.3 02/16/2023     Lab Results   Component Value Date/Time     02/16/2023 06:24 AM    K 4.6 02/16/2023 06:24 AM     02/16/2023 06:24 AM    CO2 21 02/16/2023 06:24 AM    BUN 33 02/16/2023 06:24 AM    CREATININE 1.1 02/16/2023 06:24 AM    GFRAA 44 08/01/2022 09:03 AM    LABGLOM 50 02/16/2023 06:24 AM    GLUCOSE 300 02/16/2023 06:24 AM    PROT 6.9 02/16/2023 06:24 AM    LABALBU 3.3 02/16/2023 06:24 AM    CALCIUM 9.1 02/16/2023 06:24 AM    BILITOT 0.3 02/16/2023 06:24 AM    ALKPHOS 58 02/16/2023 06:24 AM    AST 23 02/16/2023 06:24 AM    ALT 21 02/16/2023 06:24 AM     Radiology:  CT A/P   Impression:       1. Fatty liver infiltration. 2. Patchy ground-glass parenchymal infiltrates are seen within the lung bases   bilaterally suspicious for multilobar pneumonia, such as viral pneumonia. Could consider correlation with COVID testing given appearance. 3. Diffuse atherosclerosis including coronary artery involvement. 4. Other nonacute findings within the abdomen and pelvis as above     ECHO   Summary   Normal left ventricular chamber size. Normal left ventricular systolic function. Visually estimated LVEF is 55-60 %. No wall motion abnormalities. Indeterminate diastolic function.    Normal right ventricle structure and function. Normal left atrial size. Normal right atrial size   Calcified aortic leaflets. Moderate aortic stenosis. Mean gradient is 31.5 mm Hg. Trace aortic regurgitation. Mild mitral regurgitation is present. Mild tricuspid regurgitation. Unable to estimate PA pressure. No gross evidence of infective endocarditis. However, images are   technically difficult. Consider NINI if clinically indicated. Microbiology:   2/13/2023-blood cx- staph species, micrococcus  2/16/2023- blood cx- pending     ASSESSMENT:  COVID - on room air. Bacteremia- staph species, micrococcus species - contaminant     PLAN:  Off antibiotics   Repeat blood cultures pending   ECHO reviewed    Check cultures  Monitor labs  Patient can d/c from ID POV  For discharge today   Follow-up PRN    Primus Goltz, APRN - CNS  8:47 AM  2/17/2023   Pt seen and examined. Above discussed agree with advanced practice nurse. Labs, cultures, and radiographs reviewed. Face to Face encounter occurred. Changes made as necessary.      Mis Lunsford MD

## 2023-02-18 LAB
BLOOD CULTURE, ROUTINE: ABNORMAL
ORGANISM: ABNORMAL

## 2023-02-20 ENCOUNTER — CARE COORDINATION (OUTPATIENT)
Dept: CASE MANAGEMENT | Age: 83
End: 2023-02-20

## 2023-02-20 NOTE — CARE COORDINATION
[facility] Care Transitions Initial Follow Up Call    Call within 2 business days of discharge: Yes    Patient Current Location:  Home: [address] [place] [address] [address] [place] [address]    Care Transition Nurse contacted the family by telephone to perform post hospital discharge assessment. Verified name and  with family as identifiers. Provided introduction to self, and explanation of the Care Transition Nurse role. Patient: Pati Crawford Patient : 1940   MRN: [id number]  Reason for Admission: pneumonia d/t covid-19 virus  Discharge Date: 23 RARS: Readmission Risk Score: 13.2      Last Discharge [address]       Date Complaint Diagnosis Description Type Department Provider    23 Other COVID-19 . .. ED to Hosp-Admission (Discharged) (ADMITTED) [address] Bonita Perry DO; Jelly Kuldeep... Was this an external facility discharge? No Discharge Facility: [facility]    Challenges to be reviewed by the provider   Additional needs identified to be addressed with provider: No  none               Method of communication with provider: none.      -Spoke with patient's  Jane Coats for initial covid monitoring care transition call post hospital discharge. Patient's  noted to be person of contact for inpatient case management.  -Patient admitted to [address] [place] [address] [facility] on 23  with symptoms of cough, SOB, and generalized weakness. Found to be positive covid-19.  -Patient's  states his wife no longer has any SOB, remains with a loose sounding cough but it is non productive currently. States his wife admits to a lack of energy.  -States has been eating/drinking/sleeping well. -First visit from [facility] occurred on 23 per Jane Coats.  -Patient's  denies any needs, questions, or concerns at this time and is agreeable to continued follow up from CTN.         Care Transition Nurse reviewed discharge instructions, medical action plan, and red flags with family who verbalized understanding. The family was given an opportunity to ask questions and does not have any further questions or concerns at this time. Were discharge instructions available ? Yes. Reviewed appropriate site of care based on symptoms and resources available to patient including: PCP. The family agrees to contact the PCP office for questions related to their healthcare. Advance Care Planning:   Does patient have an Advance Directive: decision maker reviewed and current. Medication reconciliation was declined by family and did not wish a call from [facility] pharmacy to review medications. CTN able to confirm stopped medication of baby aspirin. 1111F entered: no    Was patient discharged with a pulse oximeter? no    Non-face-to-face services provided:  Scheduled appointment with PCP-2/23/23  Obtained and reviewed discharge summary and/or continuity of care documents    Offered patient enrollment in the Remote Patient Monitoring (RPM) program for in-home monitoring:  not discussed on this call . Care Transitions 24 Hour Call    Do you have a copy of your discharge instructions?: Yes  Do you have all of your prescriptions and are they filled?: Yes  Have you been contacted by a [address]?: No  Have you scheduled your follow up appointment?: Yes  How are you going to get to your appointment?: Car - family or friend to transport (Comment: patient's daughter)  Do you feel like you have everything you need to keep you well at home?: Yes  Care Transitions Interventions    Pharmacist: Declined             Discussed follow-up appointments. If no appointment was previously scheduled, appointment scheduling offered: n/a. Is follow up appointment scheduled within 7 days of discharge? Yes, discharged on 2/17/23 and will see PCP on 2/23/23.     Follow Up  Future Appointments   Date Time Provider Gwen Dinh   2/23/2023 10:15 AM Andrew Jones MD [facility]   3/16/2023 11:00 AM Andrew Jones MD [facility]       Care Transition Nurse provided contact information. Plan for follow-up call in 7-10 days based on severity of symptoms and risk factors.   Plan for next call: symptom management-check cough, lack of energy  follow-up appointment-review PCP visit  Check RPM    Belinda Zendejas RN

## 2023-02-21 ENCOUNTER — TELEPHONE (OUTPATIENT)
Dept: FAMILY MEDICINE CLINIC | Age: 83
End: 2023-02-21

## 2023-02-21 LAB
BLOOD CULTURE, ROUTINE: ABNORMAL
BLOOD CULTURE, ROUTINE: NORMAL
CULTURE, BLOOD 2: NORMAL
ORGANISM: ABNORMAL
ORGANISM: ABNORMAL

## 2023-02-21 NOTE — TELEPHONE ENCOUNTER
Just an [facility] called to inform that the patient was approved and starting her Occupational Therapy in home, this week.     Just FYI

## 2023-02-23 ENCOUNTER — OFFICE VISIT (OUTPATIENT)
Dept: FAMILY MEDICINE CLINIC | Age: 83
End: 2023-02-23

## 2023-02-23 VITALS
BODY MASS INDEX: 31.64 KG/M2 | OXYGEN SATURATION: 95 % | SYSTOLIC BLOOD PRESSURE: 110 MMHG | DIASTOLIC BLOOD PRESSURE: 70 MMHG | WEIGHT: 173 LBS | HEART RATE: 61 BPM | TEMPERATURE: 97 F

## 2023-02-23 DIAGNOSIS — G30.9 ALZHEIMER'S DISEASE, UNSPECIFIED (CODE) (HCC): ICD-10-CM

## 2023-02-23 DIAGNOSIS — E03.9 ACQUIRED HYPOTHYROIDISM: ICD-10-CM

## 2023-02-23 DIAGNOSIS — E11.9 TYPE 2 DIABETES MELLITUS WITHOUT COMPLICATION, WITHOUT LONG-TERM CURRENT USE OF INSULIN (HCC): Primary | ICD-10-CM

## 2023-02-23 DIAGNOSIS — I10 ESSENTIAL HYPERTENSION: ICD-10-CM

## 2023-02-23 DIAGNOSIS — E78.5 HYPERLIPIDEMIA, UNSPECIFIED HYPERLIPIDEMIA TYPE: ICD-10-CM

## 2023-02-23 LAB
CHP ED QC CHECK: NORMAL
GLUCOSE BLD-MCNC: 263 MG/DL

## 2023-02-23 SDOH — ECONOMIC STABILITY: INCOME INSECURITY: HOW HARD IS IT FOR YOU TO PAY FOR THE VERY BASICS LIKE FOOD, HOUSING, MEDICAL CARE, AND HEATING?: NOT HARD AT ALL

## 2023-02-23 SDOH — ECONOMIC STABILITY: FOOD INSECURITY: WITHIN THE PAST 12 MONTHS, THE FOOD YOU BOUGHT JUST DIDN'T LAST AND YOU DIDN'T HAVE MONEY TO GET MORE.: NEVER TRUE

## 2023-02-23 SDOH — ECONOMIC STABILITY: HOUSING INSECURITY
IN THE LAST 12 MONTHS, WAS THERE A TIME WHEN YOU DID NOT HAVE A STEADY PLACE TO SLEEP OR SLEPT IN A SHELTER (INCLUDING NOW)?: NO

## 2023-02-23 SDOH — ECONOMIC STABILITY: FOOD INSECURITY: WITHIN THE PAST 12 MONTHS, YOU WORRIED THAT YOUR FOOD WOULD RUN OUT BEFORE YOU GOT MONEY TO BUY MORE.: NEVER TRUE

## 2023-02-23 ASSESSMENT — PATIENT HEALTH QUESTIONNAIRE - PHQ9
SUM OF ALL RESPONSES TO PHQ QUESTIONS 1-9: 1
SUM OF ALL RESPONSES TO PHQ QUESTIONS 1-9: 1
1. LITTLE INTEREST OR PLEASURE IN DOING THINGS: 1
2. FEELING DOWN, DEPRESSED OR HOPELESS: 0
SUM OF ALL RESPONSES TO PHQ9 QUESTIONS 1 & 2: 1
SUM OF ALL RESPONSES TO PHQ QUESTIONS 1-9: 1
SUM OF ALL RESPONSES TO PHQ QUESTIONS 1-9: 1

## 2023-02-23 ASSESSMENT — ENCOUNTER SYMPTOMS
EYES NEGATIVE: 1
RESPIRATORY NEGATIVE: 1
ALLERGIC/IMMUNOLOGIC NEGATIVE: 1
GASTROINTESTINAL NEGATIVE: 1

## 2023-02-23 NOTE — PROGRESS NOTES
OFFICE PROGRESS NOTE      SUBJECTIVE:        Patient ID:   Karl Smith is a 80 y.o. female who presents for   Chief Complaint   Patient presents with    Follow-Up from Hospital     Here for recheck following recent hospital stay. Daughter reports patient is weak and depressed. HPI:     Patient here with the family for the follow-up from the hospital  According to daughter patient getting depressed and feeling weak  Patient has a [address] records reviewed  Patient been taking medication as patient  Has been eating all right  Blood sugar 263  Possible steroid effect    Prior to Visit Medications    Medication Sig Taking? Authorizing Provider   levothyroxine (SYNTHROID) 88 MCG tablet Take 1 tablet by mouth Daily Yes Andrew Jones MD   atenolol (TENORMIN) 25 MG tablet Take 1 tablet by mouth in the morning and 1 tablet in the evening. Yes Andrew Jones MD   donepezil (ARICEPT) 5 MG tablet Take 1 tablet by mouth nightly Yes Andrew Jones MD   metFORMIN (GLUCOPHAGE) 500 MG tablet Take 1 tablet by mouth 2 times daily (with meals) Yes Andrew Jones MD   SYNTHROID 88 MCG tablet Take 1 tablet by mouth in the morning. Yes Andrew Jones MD   amLODIPine (NORVASC) 5 MG tablet Take 1 tablet by mouth in the morning. Yes Andrew Jones MD   Blood Glucose Calibration (QUICKTEK CONTROL SOLUTION) LIQD 1 each by In Vitro route daily Control solution for patient glucose meter to use daily Yes Andrew Jones MD   blood glucose monitor strips 1 strip by Other route daily Test 2 times a day & as needed for symptoms of irregular blood glucose. Dispense sufficient amount for indicated testing frequency plus additional to accommodate PRN testing needs. E11.9 Yes Andrew Jones MD   Lancets Thin MISC 1 each by Does not apply route daily E11.9 Yes Andrew Jones MD   blood glucose monitor kit and supplies Dispense sufficient amount for indicated testing frequency plus additional to accommodate PRN testing needs. Dispense all needed supplies to include: monitor, strips, lancing device, lancets, control solutions, alcohol swabs. Yes Ortiz [place] MD   rosuvastatin (CRESTOR) 5 MG tablet TAKE ONE TABLET BY MOUTH EVERY DAY Yes Ortiz [place] MD   glipiZIDE (GLUCOTROL) 5 MG tablet Take 1 tablet by mouth daily Yes Ortiz [place] MD   diclofenac sodium (VOLTAREN) 1 % GEL Apply 4 g topically 4 times daily  Patient not taking: Reported on 2/23/2023  Ortiz [place] MD      Social History     Socioeconomic History    Marital status:      Spouse name: None    Number of children: None    Years of education: None    Highest education level: None   Tobacco Use    Smoking status: Never    Smokeless tobacco: Never   Substance and Sexual Activity    Alcohol use: No    Drug use: No     Social Determinants of Health     Financial Resource Strain: Low Risk     Difficulty of Paying Living Expenses: Not hard at all   Food Insecurity: No Food Insecurity    Worried About Running Out of Food in the Last Year: Never true    Ran Out of Food in the Last Year: Never true   Transportation Needs: Unknown    Lack of Transportation (Non-Medical): No   Physical Activity: Inactive    Days of Exercise per Week: 0 days    Minutes of Exercise per Session: 0 min   Housing Stability: Unknown    Unstable Housing in the Last Year: No       I have reviewed Bernadette's allergies, medications, problem list, medical, social and family history and have updated as needed in the electronic medical record  Review Of Systems:    Review of Systems   Constitutional:  Positive for fatigue. HENT: Negative. Eyes: Negative. Respiratory: Negative. Cardiovascular: Negative. Gastrointestinal: Negative. Endocrine: Negative. Musculoskeletal: Negative. Skin: Negative. Allergic/Immunologic: Negative. Neurological: Negative. Hematological: Negative.     Psychiatric/Behavioral: The patient is nervous/anxious. OBJECTIVE:     VS:  Wt Readings from Last 3 Encounters:   02/23/23 173 lb (78.5 kg)   02/13/23 180 lb (81.6 kg)   12/09/22 180 lb (81.6 kg)     Temp Readings from Last 3 Encounters:   02/23/23 97 °F (36.1 °C) (Infrared)   02/17/23 97.6 °F (36.4 °C)   12/09/22 97 °F (36.1 °C) (Infrared)     BP Readings from Last 3 Encounters:   02/23/23 110/70   02/17/23 130/64   12/09/22 136/74        Physical Exam  Constitutional:       Appearance: She is well-developed. HENT:      Head: Normocephalic and atraumatic. Eyes:      Conjunctiva/sclera: Conjunctivae normal.      Pupils: Pupils are equal, round, and reactive to light. Cardiovascular:      Rate and Rhythm: Normal rate and regular rhythm. Heart sounds: Normal heart sounds. Pulmonary:      Effort: Pulmonary effort is normal.      Breath sounds: Normal breath sounds. Abdominal:      General: Bowel sounds are normal.      Palpations: Abdomen is soft. Musculoskeletal:         General: Normal range of motion. Cervical back: Normal range of motion and neck supple. Skin:     General: Skin is warm and dry. Neurological:      Mental Status: She is alert and oriented to person, place, and time. Psychiatric:         Thought Content:  Thought content normal.          Labs :    Lab Results   Component Value Date    WBC 10.8 02/16/2023    HGB 12.4 02/16/2023    HCT 37.5 02/16/2023     02/16/2023    CHOL 187 11/07/2022    TRIG 276 (H) 11/07/2022    HDL 35 11/07/2022    ALT 21 02/16/2023    AST 23 02/16/2023     02/16/2023    K 4.6 02/16/2023     02/16/2023    CREATININE 1.1 (H) 02/16/2023    BUN 33 (H) 02/16/2023    CO2 21 (L) 02/16/2023    TSH 5.940 (H) 02/13/2023    INR 1.1 02/15/2023    LABA1C 12.2 (H) 02/15/2023     Lab Results   Component Value Date/Time    COLORU Yellow 02/13/2023 09:46 PM    NITRU Negative 02/13/2023 09:46 PM    GLUCOSEU 100 02/13/2023 09:46 PM    KETUA Negative 02/13/2023 09:46 PM    UROBILINOGEN 0.2 02/13/2023 09:46 PM    BILIRUBINUR Negative 02/13/2023 09:46 PM     No results found for: PSA, CEA, , [id number],       Controlled Substances Monitoring:                                    ASSESSMENT     Patient Active Problem List    Diagnosis Date Noted    Positive blood culture 02/15/2023    Pneumonia due to COVID-19 virus 02/14/2023    Acute kidney injury ([facility] 02/14/2023    Type 2 diabetes mellitus with chronic kidney disease 08/05/2022    Stage 3b chronic kidney disease ([facility] 05/05/2022    Alzheimer's disease, unspecified 03/04/2022    Type 2 diabetes mellitus 02/11/2022    Lumbar spondylosis 07/03/2018    Spinal stenosis of lumbar region 07/03/2018    Essential hypertension 09/18/2017    Gout of multiple sites 09/18/2017    Acquired hypothyroidism 09/18/2017    Trochanteric bursitis, right hip 09/18/2017    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 Type 2 diabetes mellitus without complication, without long-term current use of insulin (HCC)  -     POCT Glucose  -     CBC with Auto Differential; Future  -     Comprehensive Metabolic Panel; Future  -     LIPID PANEL; Future  2. Alzheimer's disease, unspecified (CODE) ([facility]  3. Acquired hypothyroidism  -     CBC with Auto Differential; Future  4. Essential hypertension  -     CBC with Auto Differential; Future  -     Comprehensive Metabolic Panel; Future  5. Hyperlipidemia, unspecified hyperlipidemia type  -     CBC with Auto Differential; Future  -     Comprehensive Metabolic Panel; Future  -     LIPID PANEL;  Future       PLAN:   Continue present  Treatment  Low-sodium low-fat low-carb diet  Force fluids  Lab work before the next visit  Return to clinic earlier if any  Problems  All instruction given to the patient and the daughter        Patient Instructions   Continue present treatment  Low-sodium low-fat low-carb  Regular exercise   Lab work before the next visit  Force fluid  Return to clinic earlier if any problems    Return Regular appointment. Sonia Aguilar reviewed my findings and recommendations with Erich Moralez.     Electronicallysigned by Myriam Wheeler MD on 2/23/23 at 10:09 AM EST

## 2023-02-23 NOTE — PATIENT INSTRUCTIONS
Continue present treatment  Low-sodium low-fat low-carb  Regular exercise   Lab work before the next visit  Force fluid  Return to clinic earlier if any problems

## 2023-02-28 ENCOUNTER — CARE COORDINATION (OUTPATIENT)
Dept: CASE MANAGEMENT | Age: 83
End: 2023-02-28

## 2023-02-28 NOTE — CARE COORDINATION
Remote Patient Monitoring Enrollment Note      Date/Time:  2023 2:13 PM    Offered patient enrollment in the [facility] [address] Remote Patient Monitoring (RPM) program for in home monitoring for COVID, Pneumonia, Kidney Disease , Diabetes, and HTN. Patient declined RPM services. [facility] Care Transitions Follow Up Call    Patient Current Location:  Home: [address] [place] [address]    Care Transition Nurse contacted the patient by telephone to follow up after admission on 23. Patient: Elena Crawford  Patient : 1940   MRN: [id number]  Reason for Admission: pneumonia d/t covid-19 virus  Discharge Date: 23 RARS: Readmission Risk Score: 13.2      Needs to be reviewed by the provider   Additional needs identified to be addressed with provider: No  none             Method of communication with provider: none.      -Spoke with patient for follow up covid monitoring (positive) care transition call.   -Patient reports she is doing well, no longer has a cough and energy level is improved. -Naomi Guillorycynthia Ze Leo Said Stephani Gomez visited earlier today.  -Patient denies any needs, questions, or concerns at this time and is agreeable to continued follow up from CTN. Addressed changes since last contact:   completed PCP appointment     Discussed follow-up appointments. If no appointment was previously scheduled, appointment scheduling offered: n/a. Is follow up appointment scheduled within 7 days of discharge? Yes, discharged on 23 and saw PCP on 23.     Follow Up  Future Appointments   Date Time Provider Gwen Dinh   3/16/2023 11:00 AM Rashaad Drake MD [facility]     Non-[facility] follow up appointment(s): none      Patients top risk factors for readmission: medical condition-pneumonia, DM, CKD, HTN  and polypharmacy  Interventions to address risk factors: Scheduled appointment with PCP-continue consistent follow up, keep 3/16/23/visit  and maintain with [facility] [place] [address]    Offered patient enrollment in the Remote Patient Monitoring (RPM) program for in-home monitoring: Patient declined. Care Transitions Subsequent and Final Call    Subsequent and Final Calls  Do you have any ongoing symptoms?: No  Have your medications changed?: No  Do you have any questions related to your medications?: No  Do you currently have any active services?: Yes  Are you currently active with any services?: Home Health  Do you have any needs or concerns that I can assist you with?: No  Identified Barriers: None  Care Transitions Interventions  No Identified Needs    Pharmacist: Declined    Other Interventions:              Plan for follow-up call in 10-14 days based on severity of symptoms and risk factors.   Plan for next call:  routine follow up    Dennis Olson RN

## 2023-03-14 ENCOUNTER — CARE COORDINATION (OUTPATIENT)
Dept: CASE MANAGEMENT | Age: 83
End: 2023-03-14

## 2023-03-14 DIAGNOSIS — E78.5 HYPERLIPIDEMIA, UNSPECIFIED HYPERLIPIDEMIA TYPE: ICD-10-CM

## 2023-03-14 LAB
ALBUMIN SERPL-MCNC: 3.9 G/DL (ref 3.5–5.2)
ALP BLD-CCNC: 49 U/L (ref 35–104)
ALT SERPL-CCNC: 28 U/L (ref 0–32)
ANION GAP SERPL CALCULATED.3IONS-SCNC: 12 MMOL/L (ref 7–16)
AST SERPL-CCNC: 35 U/L (ref 0–31)
BILIRUB SERPL-MCNC: 0.4 MG/DL (ref 0–1.2)
BUN BLDV-MCNC: 17 MG/DL (ref 6–23)
CALCIUM SERPL-MCNC: 9.6 MG/DL (ref 8.6–10.2)
CHLORIDE BLD-SCNC: 105 MMOL/L (ref 98–107)
CHOLESTEROL, TOTAL: 191 MG/DL (ref 0–199)
CO2: 23 MMOL/L (ref 22–29)
CREAT SERPL-MCNC: 1 MG/DL (ref 0.5–1)
GFR SERPL CREATININE-BSD FRML MDRD: 56 ML/MIN/1.73
GLUCOSE BLD-MCNC: 241 MG/DL (ref 74–99)
HDLC SERPL-MCNC: 38 MG/DL
LDL CHOLESTEROL CALCULATED: 107 MG/DL (ref 0–99)
POTASSIUM SERPL-SCNC: 4.3 MMOL/L (ref 3.5–5)
SODIUM BLD-SCNC: 140 MMOL/L (ref 132–146)
TOTAL PROTEIN: 7.3 G/DL (ref 6.4–8.3)
TRIGL SERPL-MCNC: 230 MG/DL (ref 0–149)
VLDLC SERPL CALC-MCNC: 46 MG/DL

## 2023-03-14 NOTE — CARE COORDINATION
[facility] Care Transitions Follow Up Call    Patient Current Location:  Home: [address] [place] [address]    Care Transition Nurse contacted the patient by telephone to follow up after admission on 23. Patient: Elwood Mcburney Ashbaugh  Patient : 1940   MRN: [id number]  Reason for Admission: pneumonia d/t covid-19 virus  Discharge Date: 23 RARS: Readmission Risk Score: 13.2      Needs to be reviewed by the provider   Additional needs identified to be addressed with provider: No  none             Method of communication with provider: none.        -Spoke with patient for follow up covid monitoring (positive) care transition call.   -Patient reports she is doing well, having no symptoms and continues to have improvement in energy level.  -Aware of below PCP appointment.  -Patient denies any needs, questions, or concerns at this time. -CTN to sign off as patient is low risk, having no issues, and completed PCP HFU on 23. Addressed changes since last contact:  home health care-completed with [facility] per patient       Follow Up  Future Appointments   Date Time Provider Gwen Dinh   3/16/2023 11:00 AM Yudelka Alejandro MD [facility]     Non-[facility] follow up appointment(s): none           Care Transitions Subsequent and Final Call    Subsequent and Final Calls  Do you have any ongoing symptoms?: No  Have your medications changed?: No  Do you have any questions related to your medications?: No  Do you have any needs or concerns that I can assist you with?: No  Identified Barriers: None  Care Transitions Interventions  No Identified Needs    Pharmacist: Declined                 No further follow-up call indicated based on severity of symptoms and risk factors.     Khanh Sams RN

## 2023-03-16 ENCOUNTER — OFFICE VISIT (OUTPATIENT)
Dept: FAMILY MEDICINE CLINIC | Age: 83
End: 2023-03-16

## 2023-03-16 VITALS
OXYGEN SATURATION: 94 % | TEMPERATURE: 97 F | HEART RATE: 69 BPM | DIASTOLIC BLOOD PRESSURE: 66 MMHG | SYSTOLIC BLOOD PRESSURE: 128 MMHG | WEIGHT: 182 LBS | HEIGHT: 62 IN | BODY MASS INDEX: 33.49 KG/M2

## 2023-03-16 DIAGNOSIS — Z00.00 MEDICARE ANNUAL WELLNESS VISIT, SUBSEQUENT: Primary | ICD-10-CM

## 2023-03-16 ASSESSMENT — PATIENT HEALTH QUESTIONNAIRE - PHQ9
2. FEELING DOWN, DEPRESSED OR HOPELESS: 0
SUM OF ALL RESPONSES TO PHQ QUESTIONS 1-9: 0
1. LITTLE INTEREST OR PLEASURE IN DOING THINGS: 0
SUM OF ALL RESPONSES TO PHQ QUESTIONS 1-9: 0
SUM OF ALL RESPONSES TO PHQ QUESTIONS 1-9: 0
SUM OF ALL RESPONSES TO PHQ9 QUESTIONS 1 & 2: 0
SUM OF ALL RESPONSES TO PHQ QUESTIONS 1-9: 0

## 2023-03-16 ASSESSMENT — LIFESTYLE VARIABLES: HOW OFTEN DO YOU HAVE A DRINK CONTAINING ALCOHOL: NEVER

## 2023-03-16 NOTE — PATIENT INSTRUCTIONS
Preventing Falls: Care Instructions  Overview     Getting around your home safely can be a challenge if you have injuries or health problems that make it easy for you to fall. Loose rugs and furniture in walkways are among the dangers for many older people who have problems walking or who have poor eyesight. People who have conditions such as arthritis, osteoporosis, or dementia also have to be careful not to fall. You can make your home safer with a few simple measures. Follow-up care is a key part of your treatment and safety. Be sure to make and go to all appointments, and call your doctor if you are having problems. It's also a good idea to know your test results and keep a list of the medicines you take. How can you care for yourself at home? Taking care of yourself  Exercise regularly to improve your strength, muscle tone, and balance. Walk if you can. Swimming may be a good choice if you cannot walk easily. Have your vision and hearing checked each year or any time you notice a change. If you have trouble seeing and hearing, you might not be able to avoid objects and could lose your balance. Know the side effects of the medicines you take. Ask your doctor or pharmacist whether the medicines you take can affect your balance. Sleeping pills or sedatives can affect your balance. Limit the amount of alcohol you drink. Alcohol can impair your balance and other senses. Ask your doctor whether calluses or corns on your feet need to be removed. If you wear loose-fitting shoes because of calluses or corns, you can lose your balance and fall. Talk to your doctor if you have numbness in your feet. You may get dizzy if you do not drink enough water. To prevent dehydration, drink plenty of fluids. Choose water and other clear liquids. If you have kidney, heart, or liver disease and have to limit fluids, talk with your doctor before you increase the amount of fluids you drink.   Preventing falls at home  Remove raised doorway thresholds, throw rugs, and clutter. Repair loose carpet or raised areas in the floor. Move furniture and electrical cords to keep them out of walking paths. Use nonskid floor wax, and wipe up spills right away, especially on ceramic tile floors. If you use a walker or cane, put rubber tips on it. If you use crutches, clean the bottoms of them regularly with an abrasive pad, such as steel wool. Keep your house well lit, especially stairways, porches, and outside walkways. Use night-lights in areas such as hallways and bathrooms. Add extra light switches or use remote switches (such as switches that go on or off when you clap your hands) to make it easier to turn lights on if you have to get up during the night. Install sturdy handrails on stairways. Move items in your cabinets so that the things you use a lot are on the lower shelves (about waist level). Keep a cordless phone and a flashlight with new batteries by your bed. If possible, put a phone in each of the main rooms of your house, or carry a cell phone in case you fall and cannot reach a phone. Or, you can wear a device around your neck or wrist. You push a button that sends a signal for help. Wear low-heeled shoes that fit well and give your feet good support. Use footwear with nonskid soles. Check the heels and soles of your shoes for wear. Repair or replace worn heels or soles. Do not wear socks without shoes on smooth floors, such as wood. Walk on the grass when the sidewalks are slippery. If you live in an area that gets snow and ice in the winter, sprinkle salt on slippery steps and sidewalks. Or ask a family member or friend to do this for you. Preventing falls in the bath  Install grab bars and nonskid mats inside and outside your shower or tub and near the toilet and sinks. Use shower chairs and bath benches. Use a hand-held shower head that will allow you to sit while showering.   Get into a tub or shower by putting the weaker leg in first. Get out of a tub or shower with your strong side first.  Repair loose toilet seats and consider installing a raised toilet seat to make getting on and off the toilet easier. Keep your bathroom door unlocked while you are in the shower. Where can you learn more? Go to http://www.parson.com/ and enter G117 to learn more about \"Preventing Falls: Care Instructions. \"  Current as of: May 4, 2022               Content Version: 13.5  © 8580-7903 Healthwise, [employer]. Care instructions adapted under license by [facility] ([facility]). If you have questions about a medical condition or this instruction, always ask your healthcare professional. [address] any warranty or liability for your use of this information. Learning About Being Active as an Older Adult  Why is being active important as you get older? Being active is one of the best things you can do for your health. And it's never too late to start. Being active--or getting active, if you aren't already--has definite benefits. It can:  Give you more energy,  Keep your mind sharp. Improve balance to reduce your risk of falls. Help you manage chronic illness with fewer medicines. No matter how old you are, how fit you are, or what health problems you have, there is a form of activity that will work for you. And the more physical activity you can do, the better your overall health will be. What kinds of activity can help you stay healthy? Being more active will make your daily activities easier. Physical activity includes planned exercise and things you do in daily life. There are four types of activity:  Aerobic. Doing aerobic activity makes your heart and lungs strong. Includes walking, dancing, and gardening. Aim for at least 2½ hours spread throughout the week. It improves your energy and can help you sleep better. Muscle-strengthening.   This type of activity can help maintain muscle and strengthen bones. Includes climbing stairs, using resistance bands, and lifting or carrying heavy loads. Aim for at least twice a week. It can help protect the knees and other joints. Stretching. Stretching gives you better range of motion in joints and muscles. Includes upper arm stretches, calf stretches, and gentle yoga. Aim for at least twice a week, preferably after your muscles are warmed up from other activities. It can help you function better in daily life. Balancing. This helps you stay coordinated and have good posture. Includes heel-to-toe walking, min chi, and certain types of yoga. Aim for at least 3 days a week. It can reduce your risk of falling. Even if you have a hard time meeting the recommendations, it's better to be more active than less active. All activity done in each category counts toward your weekly total. You'd be surprised how daily things like carrying groceries, keeping up with grandchildren, and taking the stairs can add up. What keeps you from being active? If you've had a hard time being more active, you're not alone. Maybe you remember being able to do more. Or maybe you've never thought of yourself as being active. It's frustrating when you can't do the things you want. Being more active can help. What's holding you back? Getting started. Have a goal, but break it into easy tasks. Small steps build into big accomplishments. Staying motivated. If you feel like skipping your activity, remember your goal. Maybe you want to move better and stay independent. Every activity gets you one step closer. Not feeling your best.  Start with 5 minutes of an activity you enjoy. Prove to yourself you can do it. As you get comfortable, increase your time. You may not be where you want to be. But you're in the process of getting there. Everyone starts somewhere. How can you find safe ways to stay active?   Talk with your doctor about any physical challenges you're facing. Make a plan with your doctor if you have a health problem or aren't sure how to get started with activity. If you're already active, ask your doctor if there is anything you should change to stay safe as your body and health change. If you tend to feel dizzy after you take medicine, avoid activity at that time. Try being active before you take your medicine. This will reduce your risk of falls. If you plan to be active at home, make sure to clear your space before you get started. Remove things like TV cords, coffee tables, and throw rugs. It's safest to have plenty of space to move freely. The key to getting more active is to take it slow and steady. Try to improve only a little bit at a time. Pick just one area to improve on at first. And if an activity hurts, stop and talk to your doctor. Where can you learn more? Go to http://[facility].parson.com/ and enter P600 to learn more about \"Learning About Being Active as an Older Adult. \"  Current as of: October 10, 2022               Content Version: 13.5  © 5220-9874 Healthwise, [employer]. Care instructions adapted under license by [facility] ([facility]). If you have questions about a medical condition or this instruction, always ask your healthcare professional. [address] any warranty or liability for your use of this information. Hearing Loss: Care Instructions  Overview     Hearing loss is a sudden or slow decrease in how well you hear. It can range from mild to severe. Permanent hearing loss can occur with aging. It also can happen when you are exposed long-term to loud noise. Examples include listening to loud music, riding motorcycles, or being around other loud machines. Hearing loss can affect your work and home life. It can make you feel lonely or depressed. You may feel that you have lost your independence. But hearing aids and other devices can help you hear better and feel connected to others.   Follow-up care is a key part of your treatment and safety. Be sure to make and go to all appointments, and call your doctor if you are having problems. It's also a good idea to know your test results and keep a list of the medicines you take. How can you care for yourself at home? Avoid loud noises whenever possible. This helps keep your hearing from getting worse. Always wear hearing protection around loud noises. Wear a hearing aid as directed. See a professional who can help you pick a hearing aid that fits you. Have hearing tests as your doctor suggests. They can show whether your hearing has changed. Your hearing aid may need to be adjusted. Use other devices as needed. These may include:  Telephone amplifiers and hearing aids that can connect to a television, stereo, radio, or microphone. Devices that use lights or vibrations. These alert you to the doorbell, a ringing telephone, or a baby monitor. Television closed-captioning. This shows the words at the bottom of the screen. Most new TVs can do this. TTY (text telephone). This lets you type messages back and forth on the telephone instead of talking or listening. These devices are also called TDD. When messages are typed on the keyboard, they are sent over the phone line to a receiving TTY. The message is shown on a monitor. Use text messaging, social media, and email if it is hard for you to communicate by telephone. Try to learn a listening technique called speechreading. It is not lipreading. You pay attention to people's gestures, expressions, posture, and tone of voice. These clues can help you understand what a person is saying. Face the person you are talking to, and have them face you. Make sure the lighting is good. You need to see the other person's face clearly. Think about counseling if you need help to adjust to your hearing loss. When should you call for help?   Watch closely for changes in your health, and be sure to contact your doctor if:    You think your hearing is getting worse.     You have new symptoms, such as dizziness or nausea. Where can you learn more? Go to http://www.parson.com/ and enter R798 to learn more about \"Hearing Loss: Care Instructions. \"  Current as of: May 4, 2022               Content Version: 13.5  © 2006-2022 [employer]. Care instructions adapted under license by [facility] ([facility]). If you have questions about a medical condition or this instruction, always ask your healthcare professional. [address] any warranty or liability for your use of this information. Learning About Activities of Daily Living  What are activities of daily living? Activities of daily living (ADLs) are the basic self-care tasks you do every day. As you age, and if you have health problems, you may find that it's harder to do these things for yourself. That's when you may need some help. Your doctor uses ADLs to measure how much help you need. Knowing what you can and can't do for yourself is an important first step to getting help. And when you have the help you need, you can stay as independent as possible. Your doctor will want to know if you are able to do tasks such as: Take a bath or shower without help. Go to the bathroom by yourself. Dress and undress without help. Shave, comb your hair, and brush teeth on your own. Get in and out of bed or a chair without help. Feed yourself without help. If you are having trouble doing basic self-care tasks, talk with your doctor. You may want to bring a caregiver or family member who can help the doctor understand your needs and abilities. How will a doctor assess your ADLs? Asking about ADLs is part of a routine health checkup your doctor will likely do as you age.  Your health check might be done in a doctor's office, in your home, or at a hospital. The goal is to find out if you are having any problems that could make your health problems worse or that make it unsafe for you to be on your own.  To measure your ADLs, your doctor will ask how hard it is for you to do routine tasks. He or she may also want to know if you have changed the way you do a task because of a health problem. He or she may watch how you:  Walk back and forth.  Keep your balance while you stand or walk.  Move from sitting to standing or from a bed to a chair.  Button or unbutton a shirt or sweater.  Remove and put on your shoes.  It's normal to feel a little worried or anxious if you find you can't do all the things you used to be able to do. Talking with your doctor about ADLs isn't a test that you either pass or fail. It's just a way to get more information about your health and safety.  Follow-up care is a key part of your treatment and safety. Be sure to make and go to all appointments, and call your doctor if you are having problems. It's also a good idea to know your test results and keep a list of the medicines you take.  Current as of: October 6, 2021               Content Version: 13.5  © 2006-2022 [employer].   Care instructions adapted under license by [employer]. If you have questions about a medical condition or this instruction, always ask your healthcare professional. [employer] disclaims any warranty or liability for your use of this information.           Advance Directives: Care Instructions  Overview  An advance directive is a legal way to state your wishes at the end of your life. It tells your family and your doctor what to do if you can't say what you want.  There are two main types of advance directives. You can change them any time your wishes change.  Living will.  This form tells your family and your doctor your wishes about life support and other treatment. The form is also called a declaration.  Medical power of .  This form lets you name a person to make treatment decisions for you when you can't  speak for yourself. This person is called a health care agent (health care proxy, health care surrogate). The form is also called a durable power of  for health care. If you do not have an advance directive, decisions about your medical care may be made by a family member, or by a doctor or a  who doesn't know you. It may help to think of an advance directive as a gift to the people who care for you. If you have one, they won't have to make tough decisions by themselves. For more information, including forms for your state, see the [address] website (www.caringinfo.org/planning/advance-directives/). Follow-up care is a key part of your treatment and safety. Be sure to make and go to all appointments, and call your doctor if you are having problems. It's also a good idea to know your test results and keep a list of the medicines you take. What should you include in an advance directive? Many states have a unique advance directive form. (It may ask you to address specific issues.) Or you might use a universal form that's approved by many states. If your form doesn't tell you what to address, it may be hard to know what to include in your advance directive. Use the questions below to help you get started. Who do you want to make decisions about your medical care if you are not able to? What life-support measures do you want if you have a serious illness that gets worse over time or can't be cured? What are you most afraid of that might happen? (Maybe you're afraid of having pain, losing your independence, or being kept alive by machines.)  Where would you prefer to die? (Your home? A hospital? A nursing home?)  Do you want to donate your organs when you die? Do you want certain Congregational practices performed before you die? When should you call for help? Be sure to contact your doctor if you have any questions. Where can you learn more?   Go to http://www.parson.com/ and enter R264 to learn more about \"Advance Directives: Care Instructions. \"  Current as of: June 16, 2022               Content Version: 13.5  © 2006-2022 [employer]. Care instructions adapted under license by [facility] ([facility]). If you have questions about a medical condition or this instruction, always ask your healthcare professional. [address] any warranty or liability for your use of this information. Starting a Weight Loss Plan: Care Instructions  Overview     If you're thinking about losing weight, it can be hard to know where to start. Your doctor can help you set up a weight loss plan that best meets your needs. You may want to take a class on nutrition or exercise, or you could join a weight loss support group. If you have questions about how to make changes to your eating or exercise habits, ask your doctor about seeing a registered dietitian or an exercise specialist.  It can be a big challenge to lose weight. But you don't have to make huge changes at once. Make small changes, and stick with them. When those changes become habit, add a few more changes. If you don't think you're ready to make changes right now, try to pick a date in the future. Make an appointment to see your doctor to discuss whether the time is right for you to start a plan. Follow-up care is a key part of your treatment and safety. Be sure to make and go to all appointments, and call your doctor if you are having problems. It's also a good idea to know your test results and keep a list of the medicines you take. How can you care for yourself at home? Set realistic goals. Many people expect to lose much more weight than is likely. A weight loss of 5% to 10% of your body weight may be enough to improve your health. Get family and friends involved to provide support. Talk to them about why you are trying to lose weight, and ask them to help.  They can help by participating in exercise and having meals with you, even if they may be eating something different. Find what works best for you. If you do not have time or do not like to cook, a program that offers meal replacement bars or shakes may be better for you. Or if you like to prepare meals, finding a plan that includes daily menus and recipes may be best.  Ask your doctor about other health professionals who can help you achieve your weight loss goals. A dietitian can help you make healthy changes in your diet. An exercise specialist or  can help you develop a safe and effective exercise program.  A counselor or psychiatrist can help you cope with issues such as depression, anxiety, or family problems that can make it hard to focus on weight loss. Consider joining a support group for people who are trying to lose weight. Your doctor can suggest groups in your area. Where can you learn more? Go to http://www.woods.com/ and enter U357 to learn more about \"Starting a Weight Loss Plan: Care Instructions. \"  Current as of: August 25, 2022               Content Version: 13.5  © 2006-2022 [employer]. Care instructions adapted under license by [facility] ([facility]). If you have questions about a medical condition or this instruction, always ask your healthcare professional. [place] [address] any warranty or liability for your use of this information. A Healthy Heart: Care Instructions  Your Care Instructions     Coronary artery disease, also called heart disease, occurs when a substance called plaque builds up in the vessels that supply oxygen-rich blood to your heart muscle. This can narrow the blood vessels and reduce blood flow. A heart attack happens when blood flow is completely blocked. A high-fat diet, smoking, and other factors increase the risk of heart disease. Your doctor has found that you have a chance of having heart disease. You can do lots of things to keep your heart healthy. It may not be easy, but you can change your diet, exercise more, and quit smoking. These steps really work to lower your chance of heart disease. Follow-up care is a key part of your treatment and safety. Be sure to make and go to all appointments, and call your doctor if you are having problems. It's also a good idea to know your test results and keep a list of the medicines you take. How can you care for yourself at home? Diet    Use less salt when you cook and eat. This helps lower your blood pressure. Taste food before salting. Add only a little salt when you think you need it. With time, your taste buds will adjust to less salt.     Eat fewer snack items, fast foods, canned soups, and other high-salt, high-fat, processed foods.     Read food labels and try to avoid saturated and trans fats. They increase your risk of heart disease by raising cholesterol levels.     Limit the amount of solid fat-butter, margarine, and shortening-you eat. Use olive, peanut, or canola oil when you cook. Bake, broil, and steam foods instead of frying them.     Eat a variety of fruit and vegetables every day. Dark green, deep orange, red, or yellow fruits and vegetables are especially good for you. Examples include spinach, carrots, peaches, and berries.     Foods high in fiber can reduce your cholesterol and provide important vitamins and minerals. High-fiber foods include whole-grain cereals and breads, oatmeal, beans, brown rice, citrus fruits, and apples.     Eat lean proteins. Heart-healthy proteins include seafood, lean meats and poultry, eggs, beans, peas, nuts, seeds, and soy products.     Limit drinks and foods with added sugar. These include candy, desserts, and soda pop. Lifestyle changes    If your doctor recommends it, get more exercise. Walking is a good choice. Bit by bit, increase the amount you walk every day. Try for at least 30 minutes on most days of the week.  You also may want to swim, bike, or do other activities.     Do not smoke. If you need help quitting, talk to your doctor about stop-smoking programs and medicines. These can increase your chances of quitting for good. Quitting smoking may be the most important step you can take to protect your heart. It is never too late to quit.     Limit alcohol to 2 drinks a day for men and 1 drink a day for women. Too much alcohol can cause health problems.     Manage other health problems such as diabetes, high blood pressure, and high cholesterol. If you think you may have a problem with alcohol or drug use, talk to your doctor.   Medicines    Take your medicines exactly as prescribed. Call your doctor if you think you are having a problem with your medicine.     If your doctor recommends aspirin, take the amount directed each day. Make sure you take aspirin and not another kind of pain reliever, such as acetaminophen (Tylenol).   When should you call for help?   Call 911 if you have symptoms of a heart attack. These may include:    Chest pain or pressure, or a strange feeling in the chest.     Sweating.     Shortness of breath.     Pain, pressure, or a strange feeling in the back, neck, jaw, or upper belly or in one or both shoulders or arms.     Lightheadedness or sudden weakness.     A fast or irregular heartbeat.   After you call 911, the  may tell you to chew 1 adult-strength or 2 to 4 low-dose aspirin. Wait for an ambulance. Do not try to drive yourself.  Watch closely for changes in your health, and be sure to contact your doctor if you have any problems.  Where can you learn more?  Go to https://www.Ring[employer].net/patientEd and enter F075 to learn more about \"A Healthy Heart: Care Instructions.\"  Current as of: September 7, 2022               Content Version: 13.5  © 4579-5441 Healthwise, Incorporated.   Care instructions adapted under license by [employer]. If you have questions about a medical condition or this instruction, always ask your healthcare  clara. [address] any warranty or liability for your use of this information. Personalized Preventive Plan for Ren Hart - 3/16/2023  Medicare offers a range of preventive health benefits. Some of the tests and screenings are paid in full while other may be subject to a deductible, co-insurance, and/or copay. Some of these benefits include a comprehensive review of your medical history including lifestyle, illnesses that may run in your family, and various assessments and screenings as appropriate. After reviewing your medical record and screening and assessments performed today your provider may have ordered immunizations, labs, imaging, and/or referrals for you. A list of these orders (if applicable) as well as your Preventive Care list are included within your After Visit Summary for your review. Other Preventive Recommendations:    A preventive eye exam performed by an eye specialist is recommended every 1-2 years to screen for glaucoma; cataracts, macular degeneration, and other eye disorders. A preventive dental visit is recommended every 6 months. Try to get at least 150 minutes of exercise per week or 10,000 steps per day on a pedometer . Order or download the FREE \"Exercise & Physical Activity: Your Everyday Guide\" from [employer]. Call 8-609.240.3272 or search [employer] online. You need 7904-6056 mg of calcium and 6291-5002 IU of vitamin D per day. It is possible to meet your calcium requirement with diet alone, but a vitamin D supplement is usually necessary to meet this goal.  When exposed to the sun, use a sunscreen that protects against both UVA and UVB radiation with an SPF of 30 or greater. Reapply every 2 to 3 hours or after sweating, drying off with a towel, or swimming. Always wear a seat belt when traveling in a car. Always wear a helmet when riding a bicycle or motorcycle.

## 2023-03-16 NOTE — PROGRESS NOTES
Medicare Annual Wellness Visit    Xi Cervantes is here for Medicare AWV (Here for AWV)    Assessment & Plan   Medicare annual wellness visit, subsequent      Recommendations for Preventive Services Due: see orders and patient instructions/AVS.  Recommended screening schedule for the next 5-10 years is provided to the patient in written form: see Patient Instructions/AVS.     Return in 2 weeks (on 3/30/2023) for Medicare Annual Wellness Visit in 1 year, Medication Check, test results, diabetes check. Subjective   The following acute and/or chronic problems were also addressed today:      Patient's complete Health Risk Assessment and screening values have been reviewed and are found in Flowsheets. The following problems were reviewed today and where indicated follow up appointments were made and/or referrals ordered. Positive Risk Factor Screenings with Interventions:    Fall Risk:  Do you feel unsteady or are you worried about falling? : no  2 or more falls in past year?: (!) yes  Fall with injury in past year?: no     Interventions:    Safety tips provided    Cognitive: Words recalled: 0 Words Recalled   Clock Drawing Test (CDT): Normal   Total Score: (!) 2   Total Score Interpretation: Abnormal Mini-Cog      Interventions:  Patient referred to the neurology             Weight and Activity:  Physical Activity: Inactive    Days of Exercise per Week: 0 days    Minutes of Exercise per Session: 0 min     On average, how many days per week do you engage in moderate to strenuous exercise (like a brisk walk)?: 0 days  Have you lost any weight without trying in the past 3 months?: No  Body mass index: (!) 33.28      Inactivity Interventions:   Instructed to exercise  Obesity Interventions:  Regular losing diet          Dentist Screen:  Have you seen the dentist within the past year?: (!) No    Intervention:  Advised to schedule with their dentist       ADL's:   Patient reports needing help with:  Select all that apply: [employer], Housekeeping, Shopping  Interventions:  Taken care of by the family members                    Objective   Vitals:    03/16/23 1125   BP: 128/66   Pulse: 69   Temp: 97 °F (36.1 °C)   TempSrc: Infrared   SpO2: 94%   Weight: 182 lb (82.6 kg)   Height: 5' 2\" (1.575 m)      Body mass index is 33.29 kg/m². No Known Allergies  Prior to Visit Medications    Medication Sig Taking? Authorizing Provider   levothyroxine (SYNTHROID) 88 MCG tablet Take 1 tablet by mouth Daily Yes Mark Cardona MD   atenolol (TENORMIN) 25 MG tablet Take 1 tablet by mouth in the morning and 1 tablet in the evening. Yes Mark Cardona MD   donepezil (ARICEPT) 5 MG tablet Take 1 tablet by mouth nightly Yes Mark Cardona MD   metFORMIN (GLUCOPHAGE) 500 MG tablet Take 1 tablet by mouth 2 times daily (with meals) Yes Mark Cardona MD   SYNTHROID 88 MCG tablet Take 1 tablet by mouth in the morning. Yes Mark Cardona MD   amLODIPine (NORVASC) 5 MG tablet Take 1 tablet by mouth in the morning. Yes Mark Cardona MD   Blood Glucose Calibration (QUICKTEK CONTROL SOLUTION) LIQD 1 each by In Vitro route daily Control solution for patient glucose meter to use daily Yes Mark Cardona MD   blood glucose monitor strips 1 strip by Other route daily Test 2 times a day & as needed for symptoms of irregular blood glucose. Dispense sufficient amount for indicated testing frequency plus additional to accommodate PRN testing needs. E11.9 Yes Mark Cardona MD   Lancets Thin MISC 1 each by Does not apply route daily E11.9 Yes Mark Cardona MD   blood glucose monitor kit and supplies Dispense sufficient amount for indicated testing frequency plus additional to accommodate PRN testing needs. Dispense all needed supplies to include: monitor, strips, lancing device, lancets, control solutions, alcohol swabs.  Yes Mark Cardona MD   diclofenac sodium (VOLTAREN) 1 % GEL Apply 4 g topically 4 times daily Yes Sravan Barksdale MD   rosuvastatin (CRESTOR) 5 MG tablet TAKE ONE TABLET BY MOUTH EVERY DAY Yes Sravan Barksdale MD   glipiZIDE (GLUCOTROL) 5 MG tablet Take 1 tablet by mouth daily Yes Sravan Barksdale MD       [facility] (Including outside providers/suppliers regularly involved in providing care):   Patient Care Team:  Sravan Barksdale MD as PCP - General (Family Medicine)  Sravan Barksdale MD as PCP - [facility]     Reviewed and updated this visit:  Tobacco  Allergies  Meds  Med Hx  Surg Hx  Soc Hx  Fam Hx             Sravan Barksdale MD

## 2023-04-20 ENCOUNTER — OFFICE VISIT (OUTPATIENT)
Dept: FAMILY MEDICINE CLINIC | Age: 83
End: 2023-04-20

## 2023-04-20 VITALS
WEIGHT: 178 LBS | DIASTOLIC BLOOD PRESSURE: 70 MMHG | TEMPERATURE: 97.2 F | HEART RATE: 71 BPM | SYSTOLIC BLOOD PRESSURE: 130 MMHG | OXYGEN SATURATION: 96 % | BODY MASS INDEX: 32.56 KG/M2

## 2023-04-20 DIAGNOSIS — E11.65 TYPE 2 DIABETES MELLITUS WITH HYPERGLYCEMIA, WITHOUT LONG-TERM CURRENT USE OF INSULIN (HCC): ICD-10-CM

## 2023-04-20 DIAGNOSIS — R73.9 HYPERGLYCEMIA: Primary | ICD-10-CM

## 2023-04-20 DIAGNOSIS — I10 ESSENTIAL HYPERTENSION: ICD-10-CM

## 2023-04-20 DIAGNOSIS — E78.49 OTHER HYPERLIPIDEMIA: ICD-10-CM

## 2023-04-20 DIAGNOSIS — G30.9 ALZHEIMER'S DISEASE, UNSPECIFIED (CODE) (HCC): ICD-10-CM

## 2023-04-20 DIAGNOSIS — E03.9 ACQUIRED HYPOTHYROIDISM: ICD-10-CM

## 2023-04-20 RX ORDER — ROSUVASTATIN CALCIUM 5 MG/1
TABLET, COATED ORAL
Qty: 90 TABLET | Refills: 1 | Status: SHIPPED | OUTPATIENT
Start: 2023-04-20

## 2023-04-20 RX ORDER — GLIPIZIDE 5 MG/1
5 TABLET ORAL DAILY
Qty: 180 TABLET | Refills: 1 | Status: SHIPPED | OUTPATIENT
Start: 2023-04-20

## 2023-04-20 RX ORDER — AMLODIPINE BESYLATE 5 MG/1
5 TABLET ORAL DAILY
Qty: 90 TABLET | Refills: 1 | Status: SHIPPED | OUTPATIENT
Start: 2023-04-20

## 2023-04-20 ASSESSMENT — ENCOUNTER SYMPTOMS
RESPIRATORY NEGATIVE: 1
EYES NEGATIVE: 1
GASTROINTESTINAL NEGATIVE: 1
ALLERGIC/IMMUNOLOGIC NEGATIVE: 1

## 2023-04-20 NOTE — PATIENT INSTRUCTIONS
Take metformin 1000 mg twice a day  Continue the medication    Low-fat low-carb diet  Regular exercises  Lab work before the next visit  Endocrinology referral made  Return to clinic earlier if any problems

## 2023-04-20 NOTE — PROGRESS NOTES
accommodate PRN testing needs. Dispense all needed supplies to include: monitor, strips, lancing device, lancets, control solutions, alcohol swabs. Yes Ari Suarez MD      Social History     Socioeconomic History    Marital status:      Spouse name: None    Number of children: None    Years of education: None    Highest education level: None   Tobacco Use    Smoking status: Never    Smokeless tobacco: Never   Substance and Sexual Activity    Alcohol use: No    Drug use: No    Sexual activity: Not Currently     Partners: Male     Social Determinants of Health     Financial Resource Strain: Low Risk     Difficulty of Paying Living Expenses: Not hard at all   Food Insecurity: No Food Insecurity    Worried About Running Out of Food in the Last Year: Never true    Ran Out of Food in the Last Year: Never true   Transportation Needs: Unknown    Lack of Transportation (Non-Medical): No   Physical Activity: Inactive    Days of Exercise per Week: 0 days    Minutes of Exercise per Session: 0 min   Housing Stability: Unknown    Unstable Housing in the Last Year: No       I have reviewed Benoits allergies, medications, problem list, medical, social and family history and have updated as needed in the electronic medical record  Review Of Systems:    Review of Systems   Constitutional: Negative. HENT: Negative. Eyes: Negative. Respiratory: Negative. Cardiovascular: Negative. Gastrointestinal: Negative. Endocrine: Negative. Musculoskeletal: Negative. Skin: Negative. Allergic/Immunologic: Negative. Neurological: Negative. Hematological: Negative. Psychiatric/Behavioral: Negative.               OBJECTIVE:     VS:  Wt Readings from Last 3 Encounters:   04/20/23 178 lb (80.7 kg)   03/16/23 182 lb (82.6 kg)   02/23/23 173 lb (78.5 kg)     Temp Readings from Last 3 Encounters:   04/20/23 97.2 °F (36.2 °C) (Infrared)   03/16/23 97 °F (36.1 °C) (Infrared)   02/23/23 97 °F (36.1 °C)

## 2023-05-05 ENCOUNTER — TELEPHONE (OUTPATIENT)
Dept: FAMILY MEDICINE CLINIC | Age: 83
End: 2023-05-05

## 2023-05-05 NOTE — TELEPHONE ENCOUNTER
I returned the call, which was the wrong number. I called [phone], spoke w/Abril and informed referral was sent to Dr. Evans Asper 4/21/23 and advised her to call the ofc. Savanna Hood stated she has the phone number and will call to schedule.

## 2023-05-05 NOTE — TELEPHONE ENCOUNTER
----- Message from Iwona Rodriguez sent at 5/5/2023  2:53 PM EDT -----  Subject: Referral Request    Reason for referral request? Patient needs a referral request for an   endocrinologist due to her diabetes . Provider patient wants to be referred to(if known):     Provider Phone Number(if known):     Additional Information for Provider?   ---------------------------------------------------------------------------  --------------  Angel Luis SCHULTZ    [phone]; OK to leave message on voicemail  ---------------------------------------------------------------------------  --------------

## 2023-05-16 DIAGNOSIS — R73.9 HYPERGLYCEMIA: ICD-10-CM

## 2023-05-16 DIAGNOSIS — E78.49 OTHER HYPERLIPIDEMIA: ICD-10-CM

## 2023-05-16 DIAGNOSIS — I10 ESSENTIAL HYPERTENSION: ICD-10-CM

## 2023-05-16 LAB
ALBUMIN SERPL-MCNC: 4.2 G/DL (ref 3.5–5.2)
ALP SERPL-CCNC: 55 U/L (ref 35–104)
ALT SERPL-CCNC: 17 U/L (ref 0–32)
ANION GAP SERPL CALCULATED.3IONS-SCNC: 11 MMOL/L (ref 7–16)
AST SERPL-CCNC: 19 U/L (ref 0–31)
BASOPHILS # BLD: 0.07 E9/L (ref 0–0.2)
BASOPHILS NFR BLD: 0.9 % (ref 0–2)
BILIRUB SERPL-MCNC: 0.3 MG/DL (ref 0–1.2)
BUN SERPL-MCNC: 26 MG/DL (ref 6–23)
CALCIUM SERPL-MCNC: 9.9 MG/DL (ref 8.6–10.2)
CHLORIDE SERPL-SCNC: 101 MMOL/L (ref 98–107)
CO2 SERPL-SCNC: 26 MMOL/L (ref 22–29)
CREAT SERPL-MCNC: 1.2 MG/DL (ref 0.5–1)
EOSINOPHIL # BLD: 0.41 E9/L (ref 0.05–0.5)
EOSINOPHIL NFR BLD: 5.2 % (ref 0–6)
ERYTHROCYTE [DISTWIDTH] IN BLOOD BY AUTOMATED COUNT: 14 FL (ref 11.5–15)
GLUCOSE SERPL-MCNC: 272 MG/DL (ref 74–99)
HCT VFR BLD AUTO: 40.7 % (ref 34–48)
HGB BLD-MCNC: 12.9 G/DL (ref 11.5–15.5)
IMM GRANULOCYTES # BLD: 0.03 E9/L
IMM GRANULOCYTES NFR BLD: 0.4 % (ref 0–5)
LYMPHOCYTES # BLD: 1.28 E9/L (ref 1.5–4)
LYMPHOCYTES NFR BLD: 16.1 % (ref 20–42)
MCH RBC QN AUTO: 27.8 PG (ref 26–35)
MCHC RBC AUTO-ENTMCNC: 31.7 % (ref 32–34.5)
MCV RBC AUTO: 87.7 FL (ref 80–99.9)
MONOCYTES # BLD: 0.59 E9/L (ref 0.1–0.95)
MONOCYTES NFR BLD: 7.4 % (ref 2–12)
NEUTROPHILS # BLD: 5.58 E9/L (ref 1.8–7.3)
NEUTS SEG NFR BLD: 70 % (ref 43–80)
PLATELET # BLD AUTO: 197 E9/L (ref 130–450)
PMV BLD AUTO: 11.4 FL (ref 7–12)
POTASSIUM SERPL-SCNC: 4.6 MMOL/L (ref 3.5–5)
PROT SERPL-MCNC: 7.1 G/DL (ref 6.4–8.3)
RBC # BLD AUTO: 4.64 E12/L (ref 3.5–5.5)
SODIUM SERPL-SCNC: 138 MMOL/L (ref 132–146)
WBC # BLD: 8 E9/L (ref 4.5–11.5)

## 2023-05-18 ENCOUNTER — OFFICE VISIT (OUTPATIENT)
Dept: FAMILY MEDICINE CLINIC | Age: 83
End: 2023-05-18

## 2023-05-18 VITALS
HEART RATE: 63 BPM | SYSTOLIC BLOOD PRESSURE: 102 MMHG | TEMPERATURE: 97 F | WEIGHT: 184 LBS | BODY MASS INDEX: 33.65 KG/M2 | DIASTOLIC BLOOD PRESSURE: 80 MMHG | OXYGEN SATURATION: 96 %

## 2023-05-18 DIAGNOSIS — E11.65 TYPE 2 DIABETES MELLITUS WITH HYPERGLYCEMIA, WITHOUT LONG-TERM CURRENT USE OF INSULIN (HCC): Primary | ICD-10-CM

## 2023-05-18 DIAGNOSIS — E03.9 ACQUIRED HYPOTHYROIDISM: ICD-10-CM

## 2023-05-18 DIAGNOSIS — E78.49 OTHER HYPERLIPIDEMIA: ICD-10-CM

## 2023-05-18 DIAGNOSIS — I10 ESSENTIAL HYPERTENSION: ICD-10-CM

## 2023-05-18 DIAGNOSIS — G30.9 ALZHEIMER'S DISEASE, UNSPECIFIED (CODE) (HCC): ICD-10-CM

## 2023-05-18 LAB — HBA1C MFR BLD: 11.7 %

## 2023-05-18 NOTE — PATIENT INSTRUCTIONS
Continue present treatment  Follow-up with the endocrinologist  Low-sodium low-fat low-carb  Regular exercises  Neurology referral has been made  Lab work before the next visit  Return to clinic earlier if any problem

## 2023-05-18 NOTE — PROGRESS NOTES
OFFICE PROGRESS NOTE      SUBJECTIVE:        Patient ID:   Marta Chaudhari is a 80 y.o. female who presents for   Chief Complaint   Patient presents with    Hypertension     Here for recheck on Hypertension,Hypothyroidism and DM. Lab work done,here for review. Daughter reports glucose was 327 this am.Family concerned over redness & itching above eyes and inside ears. Son would like patient seen by a neurologist for her memory loss. HPI:     Patient here with a family member  Complaining of rash on the eyebrows and the  Ears  Blood sugars have been high      Prior to Visit Medications    Medication Sig Taking? Authorizing Provider   glipiZIDE (GLUCOTROL) 5 MG tablet Take 1 tablet by mouth daily Yes Gabriela Anne MD   amLODIPine (NORVASC) 5 MG tablet Take 1 tablet by mouth daily Yes Gabriela Anne MD   rosuvastatin (CRESTOR) 5 MG tablet TAKE ONE TABLET BY MOUTH EVERY DAY Yes Gabriela Anne MD   metFORMIN (GLUCOPHAGE) 500 MG tablet Take 1 tablet by mouth 2 times daily (with meals)  Patient taking differently: Take 1 tablet by mouth 2 tablets twice daily. Yes Gabriela Anne MD   levothyroxine (SYNTHROID) 88 MCG tablet Take 1 tablet by mouth Daily Yes Gabriela Anne MD   atenolol (TENORMIN) 25 MG tablet Take 1 tablet by mouth in the morning and 1 tablet in the evening. Yes Gabriela Anne MD   donepezil (ARICEPT) 5 MG tablet Take 1 tablet by mouth nightly Yes Gabriela Anne MD   Blood Glucose Calibration (QUICKTEK CONTROL SOLUTION) LIQD 1 each by In Vitro route daily Control solution for patient glucose meter to use daily Yes Gabriela Anne MD   blood glucose monitor strips 1 strip by Other route daily Test 2 times a day & as needed for symptoms of irregular blood glucose. Dispense sufficient amount for indicated testing frequency plus additional to accommodate PRN testing needs. E11.9 Yes Gabriela Anne MD   Lancets Thin MISC 1 each by Does not

## 2023-05-22 ENCOUNTER — TELEPHONE (OUTPATIENT)
Dept: FAMILY MEDICINE CLINIC | Age: 83
End: 2023-05-22

## 2023-05-22 DIAGNOSIS — G30.9 ALZHEIMER'S DISEASE, UNSPECIFIED (CODE) (HCC): Primary | ICD-10-CM

## 2023-05-22 NOTE — TELEPHONE ENCOUNTER
Called requesting referral to Dr [facility] for neurology. Reports Dr Zeenat Quintero is an endocrinologist not a neurologist

## 2023-05-22 NOTE — TELEPHONE ENCOUNTER
Called Dr Giorgio Davis office regarding patient appointment and informed  ,Ivana Nicholson, of her appointment on 8-28  at 1pm

## 2023-06-26 DIAGNOSIS — E11.65 TYPE 2 DIABETES MELLITUS WITH HYPERGLYCEMIA, WITHOUT LONG-TERM CURRENT USE OF INSULIN (HCC): ICD-10-CM

## 2023-06-26 DIAGNOSIS — I10 ESSENTIAL HYPERTENSION: ICD-10-CM

## 2023-06-26 DIAGNOSIS — G30.9 ALZHEIMER'S DISEASE, UNSPECIFIED (CODE) (HCC): ICD-10-CM

## 2023-06-26 DIAGNOSIS — E03.9 ACQUIRED HYPOTHYROIDISM: ICD-10-CM

## 2023-06-26 DIAGNOSIS — E78.49 OTHER HYPERLIPIDEMIA: ICD-10-CM

## 2023-06-26 LAB
ALBUMIN SERPL-MCNC: 4.2 G/DL (ref 3.5–5.2)
ALP SERPL-CCNC: 52 U/L (ref 35–104)
ALT SERPL-CCNC: 26 U/L (ref 0–32)
ANION GAP SERPL CALCULATED.3IONS-SCNC: 17 MMOL/L (ref 7–16)
AST SERPL-CCNC: 29 U/L (ref 0–31)
BASOPHILS # BLD: 0.08 E9/L (ref 0–0.2)
BASOPHILS NFR BLD: 1 % (ref 0–2)
BILIRUB SERPL-MCNC: 0.3 MG/DL (ref 0–1.2)
BUN SERPL-MCNC: 18 MG/DL (ref 6–23)
CALCIUM SERPL-MCNC: 9.5 MG/DL (ref 8.6–10.2)
CHLORIDE SERPL-SCNC: 103 MMOL/L (ref 98–107)
CHOLESTEROL, TOTAL: 178 MG/DL (ref 0–199)
CO2 SERPL-SCNC: 23 MMOL/L (ref 22–29)
CREAT SERPL-MCNC: 1.2 MG/DL (ref 0.5–1)
EOSINOPHIL # BLD: 0.32 E9/L (ref 0.05–0.5)
EOSINOPHIL NFR BLD: 3.9 % (ref 0–6)
ERYTHROCYTE [DISTWIDTH] IN BLOOD BY AUTOMATED COUNT: 14 FL (ref 11.5–15)
GLUCOSE SERPL-MCNC: 213 MG/DL (ref 74–99)
HCT VFR BLD AUTO: 41 % (ref 34–48)
HDLC SERPL-MCNC: 37 MG/DL
HGB BLD-MCNC: 12.6 G/DL (ref 11.5–15.5)
IMM GRANULOCYTES # BLD: 0.04 E9/L
IMM GRANULOCYTES NFR BLD: 0.5 % (ref 0–5)
LDLC SERPL CALC-MCNC: 87 MG/DL (ref 0–99)
LYMPHOCYTES # BLD: 1.44 E9/L (ref 1.5–4)
LYMPHOCYTES NFR BLD: 17.3 % (ref 20–42)
MCH RBC QN AUTO: 27.1 PG (ref 26–35)
MCHC RBC AUTO-ENTMCNC: 30.7 % (ref 32–34.5)
MCV RBC AUTO: 88.2 FL (ref 80–99.9)
MONOCYTES # BLD: 0.68 E9/L (ref 0.1–0.95)
MONOCYTES NFR BLD: 8.2 % (ref 2–12)
NEUTROPHILS # BLD: 5.75 E9/L (ref 1.8–7.3)
NEUTS SEG NFR BLD: 69.1 % (ref 43–80)
PLATELET # BLD AUTO: 205 E9/L (ref 130–450)
PMV BLD AUTO: 11.1 FL (ref 7–12)
POTASSIUM SERPL-SCNC: 4.7 MMOL/L (ref 3.5–5)
PROT SERPL-MCNC: 7.3 G/DL (ref 6.4–8.3)
RBC # BLD AUTO: 4.65 E12/L (ref 3.5–5.5)
SODIUM SERPL-SCNC: 143 MMOL/L (ref 132–146)
TRIGL SERPL-MCNC: 269 MG/DL (ref 0–149)
VLDLC SERPL CALC-MCNC: 54 MG/DL
WBC # BLD: 8.3 E9/L (ref 4.5–11.5)

## 2023-06-29 ENCOUNTER — OFFICE VISIT (OUTPATIENT)
Dept: FAMILY MEDICINE CLINIC | Age: 83
End: 2023-06-29
Payer: MEDICARE

## 2023-06-29 VITALS
SYSTOLIC BLOOD PRESSURE: 132 MMHG | BODY MASS INDEX: 32.94 KG/M2 | OXYGEN SATURATION: 95 % | DIASTOLIC BLOOD PRESSURE: 82 MMHG | TEMPERATURE: 97 F | HEIGHT: 62 IN | HEART RATE: 60 BPM | WEIGHT: 179 LBS

## 2023-06-29 DIAGNOSIS — E03.9 ACQUIRED HYPOTHYROIDISM: ICD-10-CM

## 2023-06-29 DIAGNOSIS — E11.65 TYPE 2 DIABETES MELLITUS WITH HYPERGLYCEMIA, WITHOUT LONG-TERM CURRENT USE OF INSULIN (HCC): ICD-10-CM

## 2023-06-29 DIAGNOSIS — E78.49 OTHER HYPERLIPIDEMIA: ICD-10-CM

## 2023-06-29 DIAGNOSIS — G30.9 ALZHEIMER'S DISEASE, UNSPECIFIED (CODE) (HCC): ICD-10-CM

## 2023-06-29 DIAGNOSIS — R73.9 HYPERGLYCEMIA: Primary | ICD-10-CM

## 2023-06-29 DIAGNOSIS — I10 ESSENTIAL HYPERTENSION: ICD-10-CM

## 2023-06-29 PROCEDURE — 3075F SYST BP GE 130 - 139MM HG: CPT | Performed by: FAMILY MEDICINE

## 2023-06-29 PROCEDURE — 3046F HEMOGLOBIN A1C LEVEL >9.0%: CPT | Performed by: FAMILY MEDICINE

## 2023-06-29 PROCEDURE — 1123F ACP DISCUSS/DSCN MKR DOCD: CPT | Performed by: FAMILY MEDICINE

## 2023-06-29 PROCEDURE — 3079F DIAST BP 80-89 MM HG: CPT | Performed by: FAMILY MEDICINE

## 2023-06-29 PROCEDURE — 99214 OFFICE O/P EST MOD 30 MIN: CPT | Performed by: FAMILY MEDICINE

## 2023-06-29 ASSESSMENT — ENCOUNTER SYMPTOMS
ALLERGIC/IMMUNOLOGIC NEGATIVE: 1
RESPIRATORY NEGATIVE: 1
EYES NEGATIVE: 1
GASTROINTESTINAL NEGATIVE: 1

## 2023-08-07 DIAGNOSIS — G30.9 ALZHEIMER'S DISEASE, UNSPECIFIED (CODE) (HCC): ICD-10-CM

## 2023-08-07 RX ORDER — DONEPEZIL HYDROCHLORIDE 5 MG/1
5 TABLET, FILM COATED ORAL NIGHTLY
Qty: 90 TABLET | Refills: 1 | Status: SHIPPED | OUTPATIENT
Start: 2023-08-07

## 2023-08-21 DIAGNOSIS — G30.9 ALZHEIMER'S DISEASE, UNSPECIFIED (CODE) (HCC): Primary | ICD-10-CM

## 2023-08-22 DIAGNOSIS — E78.49 OTHER HYPERLIPIDEMIA: ICD-10-CM

## 2023-08-22 DIAGNOSIS — G30.9 ALZHEIMER'S DISEASE, UNSPECIFIED (CODE) (HCC): ICD-10-CM

## 2023-08-22 DIAGNOSIS — E11.65 TYPE 2 DIABETES MELLITUS WITH HYPERGLYCEMIA, WITHOUT LONG-TERM CURRENT USE OF INSULIN (HCC): ICD-10-CM

## 2023-08-22 DIAGNOSIS — R73.9 HYPERGLYCEMIA: ICD-10-CM

## 2023-08-22 DIAGNOSIS — E03.9 ACQUIRED HYPOTHYROIDISM: ICD-10-CM

## 2023-08-22 DIAGNOSIS — I10 ESSENTIAL HYPERTENSION: ICD-10-CM

## 2023-08-22 LAB
ABSOLUTE IMMATURE GRANULOCYTE: 0.04 K/UL (ref 0–0.58)
ALBUMIN SERPL-MCNC: 4.2 G/DL (ref 3.5–5.2)
ALP BLD-CCNC: 54 U/L (ref 35–104)
ALT SERPL-CCNC: 25 U/L (ref 0–32)
ANION GAP SERPL CALCULATED.3IONS-SCNC: 16 MMOL/L (ref 7–16)
AST SERPL-CCNC: 39 U/L (ref 0–31)
BASOPHILS ABSOLUTE: 0.07 K/UL (ref 0–0.2)
BASOPHILS RELATIVE PERCENT: 1 % (ref 0–2)
BILIRUB SERPL-MCNC: 0.4 MG/DL (ref 0–1.2)
BUN BLDV-MCNC: 22 MG/DL (ref 6–23)
CALCIUM SERPL-MCNC: 9.7 MG/DL (ref 8.6–10.2)
CHLORIDE BLD-SCNC: 105 MMOL/L (ref 98–107)
CHOLESTEROL: 172 MG/DL
CO2: 20 MMOL/L (ref 22–29)
CREAT SERPL-MCNC: 1.3 MG/DL (ref 0.5–1)
EOSINOPHILS ABSOLUTE: 0.36 K/UL (ref 0.05–0.5)
EOSINOPHILS RELATIVE PERCENT: 4 % (ref 0–6)
GFR SERPL CREATININE-BSD FRML MDRD: 40 ML/MIN/1.73M2
GLUCOSE BLD-MCNC: 212 MG/DL (ref 74–99)
HCT VFR BLD CALC: 40.1 % (ref 34–48)
HDLC SERPL-MCNC: 36 MG/DL
HEMOGLOBIN: 12.5 G/DL (ref 11.5–15.5)
IMMATURE GRANULOCYTES: 1 % (ref 0–5)
LDL CHOLESTEROL: 89 MG/DL
LYMPHOCYTES ABSOLUTE: 1.39 K/UL (ref 1.5–4)
LYMPHOCYTES RELATIVE PERCENT: 17 % (ref 20–42)
MCH RBC QN AUTO: 27.3 PG (ref 26–35)
MCHC RBC AUTO-ENTMCNC: 31.2 G/DL (ref 32–34.5)
MCV RBC AUTO: 87.6 FL (ref 80–99.9)
MONOCYTES ABSOLUTE: 0.64 K/UL (ref 0.1–0.95)
MONOCYTES RELATIVE PERCENT: 8 % (ref 2–12)
NEUTROPHILS ABSOLUTE: 5.75 K/UL (ref 1.8–7.3)
NEUTROPHILS RELATIVE PERCENT: 70 % (ref 43–80)
PDW BLD-RTO: 14.1 % (ref 11.5–15)
PLATELET # BLD: 207 K/UL (ref 130–450)
PMV BLD AUTO: 10.9 FL (ref 7–12)
POTASSIUM SERPL-SCNC: 4.7 MMOL/L (ref 3.5–5)
RBC # BLD: 4.58 M/UL (ref 3.5–5.5)
SODIUM BLD-SCNC: 141 MMOL/L (ref 132–146)
TOTAL PROTEIN: 7.5 G/DL (ref 6.4–8.3)
TRIGL SERPL-MCNC: 234 MG/DL
VLDLC SERPL CALC-MCNC: 47 MG/DL
WBC # BLD: 8.3 K/UL (ref 4.5–11.5)

## 2023-08-31 ENCOUNTER — OFFICE VISIT (OUTPATIENT)
Dept: FAMILY MEDICINE CLINIC | Age: 83
End: 2023-08-31
Payer: MEDICARE

## 2023-08-31 VITALS
HEART RATE: 59 BPM | OXYGEN SATURATION: 95 % | TEMPERATURE: 97 F | SYSTOLIC BLOOD PRESSURE: 130 MMHG | BODY MASS INDEX: 32.92 KG/M2 | DIASTOLIC BLOOD PRESSURE: 78 MMHG | WEIGHT: 180 LBS

## 2023-08-31 DIAGNOSIS — G30.9 ALZHEIMER'S DISEASE, UNSPECIFIED (CODE) (HCC): Primary | ICD-10-CM

## 2023-08-31 DIAGNOSIS — E11.65 TYPE 2 DIABETES MELLITUS WITH HYPERGLYCEMIA, WITHOUT LONG-TERM CURRENT USE OF INSULIN (HCC): ICD-10-CM

## 2023-08-31 DIAGNOSIS — E78.49 OTHER HYPERLIPIDEMIA: ICD-10-CM

## 2023-08-31 DIAGNOSIS — I10 ESSENTIAL HYPERTENSION: ICD-10-CM

## 2023-08-31 DIAGNOSIS — E03.9 ACQUIRED HYPOTHYROIDISM: ICD-10-CM

## 2023-08-31 DIAGNOSIS — R73.9 HYPERGLYCEMIA: ICD-10-CM

## 2023-08-31 PROCEDURE — 99214 OFFICE O/P EST MOD 30 MIN: CPT | Performed by: FAMILY MEDICINE

## 2023-08-31 PROCEDURE — 3075F SYST BP GE 130 - 139MM HG: CPT | Performed by: FAMILY MEDICINE

## 2023-08-31 PROCEDURE — 1123F ACP DISCUSS/DSCN MKR DOCD: CPT | Performed by: FAMILY MEDICINE

## 2023-08-31 PROCEDURE — 3046F HEMOGLOBIN A1C LEVEL >9.0%: CPT | Performed by: FAMILY MEDICINE

## 2023-08-31 PROCEDURE — 3078F DIAST BP <80 MM HG: CPT | Performed by: FAMILY MEDICINE

## 2023-08-31 RX ORDER — PEN NEEDLE, DIABETIC 32GX 5/32"
NEEDLE, DISPOSABLE MISCELLANEOUS
COMMUNITY
Start: 2023-08-28

## 2023-08-31 RX ORDER — INSULIN GLARGINE 300 U/ML
INJECTION, SOLUTION SUBCUTANEOUS
COMMUNITY
Start: 2023-08-28

## 2023-08-31 RX ORDER — INSULIN ASPART 100 [IU]/ML
INJECTION, SOLUTION INTRAVENOUS; SUBCUTANEOUS
COMMUNITY
Start: 2023-08-30

## 2023-08-31 RX ORDER — EMPAGLIFLOZIN 10 MG/1
TABLET, FILM COATED ORAL
COMMUNITY
Start: 2023-08-28

## 2023-08-31 NOTE — PATIENT INSTRUCTIONS
Continue present treatment  Low-sodium low-fat low-carb weight reduction diet  Regular exercises  Warm compresses to the joints  Follow-up with the consultants  Lab work before the next visit  Return to clinic earlier if any problems

## 2023-08-31 NOTE — PROGRESS NOTES
OFFICE PROGRESS NOTE      SUBJECTIVE:        Patient ID:   Mica Horne is a 80 y.o. female who presents for   Chief Complaint   Patient presents with    Hypertension     Here for recheck on Hypertension and Hyperlipidemia. Has been following Dr Malina Perera. Did have new orders but is awaiting prior auth from insurance. Lab work done,here for review. Reports Dr Cristiane Acosta did A1c and it was 11.7           HPI:   Patient here with her daughter for the follow-up  States she is feeling okay  States that she can feel  Patient did see the endocrinology  Has not started the new treatment yet  Has appointment to see the neurology next month  Blood pressure stable and normal  Blood pressure 130/78  Lab work does show elevated blood sugar of 2 Quell  Creatinine 1.3  Triglyceride 234  Patient noncompliant with the diet  Does not exercise states that she has pain because of arthritis  Prior to Visit Medications    Medication Sig Taking?  Authorizing Provider   JARDIANCE 10 MG tablet  Yes Historical Provider, MD   NOVOLOG FLEXPEN 100 UNIT/ML injection pen  Yes Historical Provider, MD ELAINE MAX SOLOSTAR 300 UNIT/ML SOPN Take at hs Yes Historical Provider, MD   BD PEN NEEDLE MARIXA 2ND GEN 32G X 4 MM MISC  Yes Historical Provider, MD   donepezil (ARICEPT) 5 MG tablet Take 1 tablet by mouth nightly Yes Yana Murray MD   metFORMIN (GLUCOPHAGE) 1000 MG tablet Take 1 tablet by mouth 2 times daily (with meals) Yes Yana Murray MD   glipiZIDE (GLUCOTROL) 5 MG tablet Take 1 tablet by mouth daily Yes Yana Murray MD   amLODIPine (NORVASC) 5 MG tablet Take 1 tablet by mouth daily Yes Yana Murray MD   rosuvastatin (CRESTOR) 5 MG tablet TAKE ONE TABLET BY MOUTH EVERY DAY Yes Yana Murray MD   levothyroxine (SYNTHROID) 88 MCG tablet Take 1 tablet by mouth Daily Yes Yana Murray MD   atenolol (TENORMIN) 25 MG tablet Take 1 tablet by mouth in the morning and 1 tablet in the

## 2023-09-01 DIAGNOSIS — I10 ESSENTIAL HYPERTENSION: ICD-10-CM

## 2023-09-01 DIAGNOSIS — E03.9 ACQUIRED HYPOTHYROIDISM: ICD-10-CM

## 2023-09-05 RX ORDER — ATENOLOL 25 MG/1
25 TABLET ORAL 2 TIMES DAILY
Qty: 180 TABLET | Refills: 1 | Status: SHIPPED | OUTPATIENT
Start: 2023-09-05

## 2023-09-05 RX ORDER — LEVOTHYROXINE SODIUM 88 UG/1
88 TABLET ORAL DAILY
Qty: 90 TABLET | Refills: 1 | Status: SHIPPED | OUTPATIENT
Start: 2023-09-05

## 2023-09-25 ENCOUNTER — OFFICE VISIT (OUTPATIENT)
Age: 83
End: 2023-09-25
Payer: MEDICARE

## 2023-09-25 VITALS
HEART RATE: 64 BPM | SYSTOLIC BLOOD PRESSURE: 115 MMHG | WEIGHT: 180 LBS | TEMPERATURE: 97.2 F | DIASTOLIC BLOOD PRESSURE: 65 MMHG | BODY MASS INDEX: 32.92 KG/M2

## 2023-09-25 DIAGNOSIS — R41.3 MEMORY LOSS: Primary | ICD-10-CM

## 2023-09-25 DIAGNOSIS — E55.9 VITAMIN D DEFICIENCY: ICD-10-CM

## 2023-09-25 DIAGNOSIS — F03.93: ICD-10-CM

## 2023-09-25 DIAGNOSIS — R73.9 HYPERGLYCEMIA: ICD-10-CM

## 2023-09-25 DIAGNOSIS — R41.3 MEMORY LOSS: ICD-10-CM

## 2023-09-25 DIAGNOSIS — E11.9 TYPE 2 DIABETES MELLITUS WITHOUT COMPLICATION, WITHOUT LONG-TERM CURRENT USE OF INSULIN (HCC): ICD-10-CM

## 2023-09-25 DIAGNOSIS — M25.50 POLYARTHRALGIA: ICD-10-CM

## 2023-09-25 LAB
C-REACTIVE PROTEIN: 10 MG/L (ref 0–5)
FOLATE: 14.1 NG/ML (ref 4.8–24.2)
SEDIMENTATION RATE, ERYTHROCYTE: 58 MM/HR (ref 0–20)
T4 FREE: 1.6 NG/DL (ref 0.9–1.7)
TSH SERPL DL<=0.05 MIU/L-ACNC: 2.33 UIU/ML (ref 0.27–4.2)
VITAMIN B-12: 307 PG/ML (ref 211–946)
VITAMIN D 25-HYDROXY: 29.3 NG/ML (ref 30–100)

## 2023-09-25 PROCEDURE — 1123F ACP DISCUSS/DSCN MKR DOCD: CPT | Performed by: PSYCHIATRY & NEUROLOGY

## 2023-09-25 PROCEDURE — 99204 OFFICE O/P NEW MOD 45 MIN: CPT | Performed by: PSYCHIATRY & NEUROLOGY

## 2023-09-25 PROCEDURE — 3074F SYST BP LT 130 MM HG: CPT | Performed by: PSYCHIATRY & NEUROLOGY

## 2023-09-25 PROCEDURE — 3078F DIAST BP <80 MM HG: CPT | Performed by: PSYCHIATRY & NEUROLOGY

## 2023-09-25 RX ORDER — DULOXETIN HYDROCHLORIDE 20 MG/1
20 CAPSULE, DELAYED RELEASE ORAL DAILY
Qty: 30 CAPSULE | Refills: 2 | Status: SHIPPED | OUTPATIENT
Start: 2023-09-25

## 2023-09-25 RX ORDER — DONEPEZIL HYDROCHLORIDE 10 MG/1
10 TABLET, FILM COATED ORAL NIGHTLY
Qty: 30 TABLET | Refills: 2 | Status: SHIPPED | OUTPATIENT
Start: 2023-09-25

## 2023-09-25 RX ORDER — GLUCOSAMINE HCL/CHONDROITIN SU 500-400 MG
1 CAPSULE ORAL DAILY
Qty: 100 STRIP | Refills: 3 | Status: SHIPPED
Start: 2023-09-25 | End: 2023-09-27

## 2023-09-25 ASSESSMENT — MINI MENTAL STATE EXAM
WHAT IS THE NAME OF THIS BUILDING [IN FACILITY]?/WHAT IS THE STREET ADDRESS OF THIS HOUSE [IN HOME]?: 0
SAY: READ THE WORDS ON THE PAGE AND THEN DO WHAT IT SAYS. THEN HAND THE PERSON
THE SHEET WITH CLOSE YOUR EYES ON IT. IF THE SUBJECT READS AND DOES NOT CLOSE THEIR EYES, REPEAT UP TO THREE TIMES. SCORE ONLY IF SUBJECT CLOSES EYES.: 1
ASK THE PERSON IF HE IS RIGHT OR LEFT-HANDED. TAKE A PIECE OF PAPER AND HOLD IT UP IN
FRONT OF THE PERSON. SAY: TAKE THIS PAPER IN YOUR RIGHT/LEFT HAND (WHICHEVER IS NON-
DOMINANT), SCORE IF PAPER IS PICKED UP IN CORRECT HAND.: 1
WHAT STATE [OR PROVINCE] ARE WE IN?: 1
PLACE DESIGN, ERASER AND PENCIL IN FRONT OF THE PERSON.  SAY:  COPY THIS DESIGN PLEASE.  SHOW: DESIGN. ALLOW: MULTIPLE TRIES. WAIT UNTIL PERSON IS FINISHED AND HANDS IT BACK. SCORE: ONLY FOR DIAGRAM WITH 4-SIDED FIGURE BETWEEN TWO 5-SIDED FIGURES: 1
SAY: FOLD THE PAPER IN HALF ONCE WITH BOTH HANDS, SCORE IF PAPER IS CORRECTLY FOLDED IN HALF.: 1
NOW WHAT WERE THE THREE OBJECTS I ASKED YOU TO REMEMBER?: 3
SAY: I AM GOING TO NAME THREE OBJECTS. WHEN I AM FINISHED, I WANT YOU TO REPEAT
THEM. REMEMBER WHAT THEY ARE BECAUSE I AM GOING TO ASK YOU TO NAME THEM AGAIN IN
A FEW MINUTES.  SAY THE FOLLOWING WORDS SLOWLY AT 1-SECOND INTERVALS - BALL/ CAR/ MAN [ITERATIONS FOR REPEAT ADMINISTRATION]: 3
SAY: I WOULD LIKE YOU TO REPEAT THIS PHRASE AFTER ME: NO IFS, ANDS, OR BUTS.: 1
WHAT DAY OF THE WEEK IS THIS?: 0
WHAT COUNTRY ARE WE IN?: 1
WHAT CITY/TOWN ARE WE IN?: 1
SHOW: WRISTWATCH [OBJECT] ASK: WHAT IS THIS CALLED?: 1
WHAT MONTH IS THIS?: 0
SAY: PUT THE PAPER DOWN ON THE FLOOR, SCORE IF PAPER IS PLACED BACK ON FLOOR: 1
SAY: I WOULD LIKE YOU TO COUNT BACKWARD FROM 100 BY SEVENS: 2
HAND THE PERSON A PENCIL AND PAPER. SAY: WRITE ANY COMPLETE SENTENCE ON THAT
PIECE OF PAPER. (NOTE: THE SENTENCE MUST MAKE SENSE. IGNORE SPELLING ERRORS): 1
SUM ALL MMSE QUESTIONS FOR TOTAL SCORE [OUT OF 30].: 23
WHAT FLOOR ARE WE ON [IN FACILITY]?/ WHAT ROOM ARE WE IN [IN HOME]?: 1
SHOW: PENCIL [OBJECT] ASK: WHAT IS THIS CALLED?: 1
WHAT IS TODAY'S DATE?: 0
WHAT YEAR IS THIS?: 1
WHICH SEASON IS THIS?: 1

## 2023-09-25 NOTE — PROGRESS NOTES
XI: sternocleidomastoid strength 5/5   XI: neck flexion strength  5/5   XII: tongue strength  Normal          Muscle tone examination showed : normal tone  Muscle strength testing revealed  grade 5  Deep tendon reflexes were 1+  The plantar reflex was not done  There was no tremors  There was not dysmetria seen on bilaterally found on finger-nose-finger testing and heel shin testing. Rapid alternating movements were unaffected  There was no sensory deficits noted    There was not extinction on the bilaterally with bilateral simultaneous stimulus. The gait examination - antalgic in right leg   Skin:  Skin is warm. she is not diaphoretic. Psychiatric: Memory, affect and judgement normal.       Mmse: 23/30    ASSESSMENT AND PLAN   1. Memory loss  Based on history and examination, suspect Alzheimers dementia with superimposed apathy and or depression  Mmse today     Suggest mri brain  Suggest metabolic tests  Increase aricept to 10 mg qhs  Plan to add namenda next visit    Has supervision at home  Plan to start cymbalta to help arthralgia/mood      - MRI BRAIN [facility]; Future  - Vitamin B1; Future  - Vitamin B12; Future  - Folate; Future  - Sedimentation Rate; Future  - C-Reactive Protein; Future  - JONES; Future  - Vitamin D 25 Hydroxy; Future  - TSH; Future  - T4, Free; Future  - RPR; Future    2. Polyarthralgia  Check esr, crp, jones  Add cymbalta 20 mg q daily  Cannot use nsaid with CKD    3. Apathetic behavior due to dementia [facility])  ? apathy vs depression  Trial of cymbalta 20 mg q daily     4. Vitamin D deficiency    - Vitamin D 25 Hydroxy;  Future     Ana Stover MD

## 2023-09-25 NOTE — TELEPHONE ENCOUNTER
Patient called for refill    Last seen 08/31/2023  Next appt 12/01/2023  [facility]

## 2023-09-26 ENCOUNTER — TELEPHONE (OUTPATIENT)
Age: 83
End: 2023-09-26

## 2023-09-26 DIAGNOSIS — E78.49 OTHER HYPERLIPIDEMIA: ICD-10-CM

## 2023-09-26 LAB
ANTI-NUCLEAR ANTIBODY (ANA): NEGATIVE
RPR: NONREACTIVE

## 2023-09-26 NOTE — TELEPHONE ENCOUNTER
Called pt to let her know that doc suggested that she take 1000 mcg of B12 and 2000 units of Vit D3 daily.

## 2023-09-26 NOTE — TELEPHONE ENCOUNTER
Ramiro Cross called for a refill. Ramiro Cross also stated patient's endocrinologist, Dr. Malina Perera changed Metformin 1000 mg from 1 tablet twice a day to Metformin 1000 mg once a day at bedtime. Patient just picked up RX for twice a day.     Last seen 8/31/2023  Next appt 12/1/2023  [facility]

## 2023-09-27 DIAGNOSIS — E11.9 TYPE 2 DIABETES MELLITUS WITHOUT COMPLICATION, WITHOUT LONG-TERM CURRENT USE OF INSULIN (HCC): Primary | ICD-10-CM

## 2023-09-27 RX ORDER — GLUCOSAMINE HCL/CHONDROITIN SU 500-400 MG
CAPSULE ORAL 2 TIMES DAILY
Qty: 100 STRIP | Refills: 4 | Status: SHIPPED | OUTPATIENT
Start: 2023-09-27

## 2023-09-27 RX ORDER — ROSUVASTATIN CALCIUM 5 MG/1
TABLET, COATED ORAL
Qty: 90 TABLET | Refills: 1 | Status: SHIPPED | OUTPATIENT
Start: 2023-09-27

## 2023-09-29 LAB — VITAMIN B1 WHOLE BLOOD: 152 NMOL/L (ref 70–180)

## 2023-10-09 ENCOUNTER — TELEPHONE (OUTPATIENT)
Age: 83
End: 2023-10-09

## 2023-10-09 NOTE — TELEPHONE ENCOUNTER
Called [facility] to let them know that central scheduling reached out several times to schedule MRI, left message with CS phone number to call and schedule pt.

## 2023-10-27 LAB
ALBUMIN SERPL-MCNC: 4.4 G/DL (ref 3.5–5.2)
ALP SERPL-CCNC: 55 U/L (ref 35–104)
ALT SERPL-CCNC: 21 U/L (ref 0–32)
ANION GAP SERPL CALCULATED.3IONS-SCNC: 15 MMOL/L (ref 7–16)
AST SERPL-CCNC: 27 U/L (ref 0–31)
BASOPHILS # BLD: 0.07 K/UL (ref 0–0.2)
BASOPHILS NFR BLD: 1 % (ref 0–2)
BILIRUB SERPL-MCNC: 0.3 MG/DL (ref 0–1.2)
BUN SERPL-MCNC: 28 MG/DL (ref 6–23)
CALCIUM SERPL-MCNC: 10.1 MG/DL (ref 8.6–10.2)
CHLORIDE SERPL-SCNC: 100 MMOL/L (ref 98–107)
CHOLEST SERPL-MCNC: 193 MG/DL
CO2 SERPL-SCNC: 25 MMOL/L (ref 22–29)
CREAT SERPL-MCNC: 1.4 MG/DL (ref 0.5–1)
CREAT UR-MCNC: 86.3 MG/DL (ref 29–226)
EOSINOPHIL # BLD: 0.32 K/UL (ref 0.05–0.5)
EOSINOPHILS RELATIVE PERCENT: 4 % (ref 0–6)
ERYTHROCYTE [DISTWIDTH] IN BLOOD BY AUTOMATED COUNT: 14.1 % (ref 11.5–15)
GFR SERPL CREATININE-BSD FRML MDRD: 38 ML/MIN/1.73M2
GLUCOSE SERPL-MCNC: 235 MG/DL (ref 74–99)
HBA1C MFR BLD: 10.4 % (ref 4–5.6)
HCT VFR BLD AUTO: 42.7 % (ref 34–48)
HDLC SERPL-MCNC: 39 MG/DL
HGB BLD-MCNC: 13.4 G/DL (ref 11.5–15.5)
IMM GRANULOCYTES # BLD AUTO: 0.03 K/UL (ref 0–0.58)
IMM GRANULOCYTES NFR BLD: 0 % (ref 0–5)
LDLC SERPL CALC-MCNC: 102 MG/DL
LYMPHOCYTES NFR BLD: 1.44 K/UL (ref 1.5–4)
LYMPHOCYTES RELATIVE PERCENT: 16 % (ref 20–42)
MCH RBC QN AUTO: 27.3 PG (ref 26–35)
MCHC RBC AUTO-ENTMCNC: 31.4 G/DL (ref 32–34.5)
MCV RBC AUTO: 87.1 FL (ref 80–99.9)
MICROALBUMIN UR-MCNC: 62 MG/L (ref 0–19)
MICROALBUMIN/CREAT UR-RTO: 72 MCG/MG CREAT (ref 0–30)
MONOCYTES NFR BLD: 0.65 K/UL (ref 0.1–0.95)
MONOCYTES NFR BLD: 7 % (ref 2–12)
NEUTROPHILS NFR BLD: 73 % (ref 43–80)
NEUTS SEG NFR BLD: 6.69 K/UL (ref 1.8–7.3)
PLATELET # BLD AUTO: 231 K/UL (ref 130–450)
PMV BLD AUTO: 11.4 FL (ref 7–12)
POTASSIUM SERPL-SCNC: 5.4 MMOL/L (ref 3.5–5)
PROT SERPL-MCNC: 7.7 G/DL (ref 6.4–8.3)
RBC # BLD AUTO: 4.9 M/UL (ref 3.5–5.5)
SODIUM SERPL-SCNC: 140 MMOL/L (ref 132–146)
T4 SERPL-MCNC: 10.8 UG/DL (ref 4.5–11.7)
TRIGL SERPL-MCNC: 260 MG/DL
TSH SERPL DL<=0.05 MIU/L-ACNC: 1.84 UIU/ML (ref 0.27–4.2)
VLDLC SERPL CALC-MCNC: 52 MG/DL
WBC OTHER # BLD: 9.2 K/UL (ref 4.5–11.5)

## 2023-10-31 LAB — C PEPTIDE SERPL-MCNC: 9.2 NG/ML (ref 1.1–4.4)

## 2023-11-08 DIAGNOSIS — I10 ESSENTIAL HYPERTENSION: ICD-10-CM

## 2023-11-08 RX ORDER — AMLODIPINE BESYLATE 5 MG/1
5 TABLET ORAL DAILY
Qty: 90 TABLET | Refills: 1 | OUTPATIENT
Start: 2023-11-08

## 2023-11-10 DIAGNOSIS — I10 ESSENTIAL HYPERTENSION: ICD-10-CM

## 2023-11-10 RX ORDER — AMLODIPINE BESYLATE 5 MG/1
5 TABLET ORAL DAILY
Qty: 90 TABLET | Refills: 1 | Status: SHIPPED
Start: 2023-11-10 | End: 2023-11-10 | Stop reason: SDUPTHER

## 2023-11-10 RX ORDER — AMLODIPINE BESYLATE 5 MG/1
5 TABLET ORAL DAILY
Qty: 90 TABLET | Refills: 1 | Status: SHIPPED | OUTPATIENT
Start: 2023-11-10

## 2023-11-27 ENCOUNTER — OFFICE VISIT (OUTPATIENT)
Age: 83
End: 2023-11-27
Payer: MEDICARE

## 2023-11-27 VITALS
SYSTOLIC BLOOD PRESSURE: 137 MMHG | TEMPERATURE: 97.9 F | HEIGHT: 61 IN | DIASTOLIC BLOOD PRESSURE: 65 MMHG | BODY MASS INDEX: 33.99 KG/M2 | WEIGHT: 180 LBS

## 2023-11-27 DIAGNOSIS — E55.9 VITAMIN D DEFICIENCY: ICD-10-CM

## 2023-11-27 DIAGNOSIS — E03.9 ACQUIRED HYPOTHYROIDISM: ICD-10-CM

## 2023-11-27 DIAGNOSIS — I10 ESSENTIAL HYPERTENSION: ICD-10-CM

## 2023-11-27 DIAGNOSIS — F03.93: ICD-10-CM

## 2023-11-27 DIAGNOSIS — G30.1 MILD LATE ONSET ALZHEIMER'S DEMENTIA WITH MOOD DISTURBANCE (HCC): Primary | ICD-10-CM

## 2023-11-27 DIAGNOSIS — R73.9 HYPERGLYCEMIA: ICD-10-CM

## 2023-11-27 DIAGNOSIS — G30.9 ALZHEIMER'S DISEASE, UNSPECIFIED (CODE) (HCC): ICD-10-CM

## 2023-11-27 DIAGNOSIS — F02.A3 MILD LATE ONSET ALZHEIMER'S DEMENTIA WITH MOOD DISTURBANCE (HCC): Primary | ICD-10-CM

## 2023-11-27 DIAGNOSIS — M35.3 POLYMYALGIA RHEUMATICA (HCC): ICD-10-CM

## 2023-11-27 DIAGNOSIS — E11.65 TYPE 2 DIABETES MELLITUS WITH HYPERGLYCEMIA, WITHOUT LONG-TERM CURRENT USE OF INSULIN (HCC): ICD-10-CM

## 2023-11-27 LAB
ABSOLUTE IMMATURE GRANULOCYTE: 0.05 K/UL (ref 0–0.58)
ALBUMIN SERPL-MCNC: 4.2 G/DL (ref 3.5–5.2)
ALP BLD-CCNC: 61 U/L (ref 35–104)
ALT SERPL-CCNC: 14 U/L (ref 0–32)
ANION GAP SERPL CALCULATED.3IONS-SCNC: 21 MMOL/L (ref 7–16)
AST SERPL-CCNC: 21 U/L (ref 0–31)
BASOPHILS ABSOLUTE: 0.09 K/UL (ref 0–0.2)
BASOPHILS RELATIVE PERCENT: 1 % (ref 0–2)
BILIRUB SERPL-MCNC: 0.3 MG/DL (ref 0–1.2)
BUN BLDV-MCNC: 26 MG/DL (ref 6–23)
CALCIUM SERPL-MCNC: 10.3 MG/DL (ref 8.6–10.2)
CHLORIDE BLD-SCNC: 101 MMOL/L (ref 98–107)
CHOLESTEROL: 204 MG/DL
CO2: 20 MMOL/L (ref 22–29)
CREAT SERPL-MCNC: 1.3 MG/DL (ref 0.5–1)
EOSINOPHILS ABSOLUTE: 0.28 K/UL (ref 0.05–0.5)
EOSINOPHILS RELATIVE PERCENT: 2 % (ref 0–6)
GFR SERPL CREATININE-BSD FRML MDRD: 42 ML/MIN/1.73M2
GLUCOSE BLD-MCNC: 165 MG/DL (ref 74–99)
HCT VFR BLD CALC: 45.6 % (ref 34–48)
HDLC SERPL-MCNC: 39 MG/DL
HEMOGLOBIN: 14.1 G/DL (ref 11.5–15.5)
IMMATURE GRANULOCYTES: 0 % (ref 0–5)
LDL CHOLESTEROL: 114 MG/DL
LYMPHOCYTES ABSOLUTE: 1.58 K/UL (ref 1.5–4)
LYMPHOCYTES RELATIVE PERCENT: 13 % (ref 20–42)
MCH RBC QN AUTO: 27 PG (ref 26–35)
MCHC RBC AUTO-ENTMCNC: 30.9 G/DL (ref 32–34.5)
MCV RBC AUTO: 87.4 FL (ref 80–99.9)
MONOCYTES ABSOLUTE: 0.88 K/UL (ref 0.1–0.95)
MONOCYTES RELATIVE PERCENT: 7 % (ref 2–12)
NEUTROPHILS ABSOLUTE: 8.94 K/UL (ref 1.8–7.3)
NEUTROPHILS RELATIVE PERCENT: 76 % (ref 43–80)
PDW BLD-RTO: 13.9 % (ref 11.5–15)
PLATELET # BLD: 258 K/UL (ref 130–450)
PMV BLD AUTO: 11 FL (ref 7–12)
POTASSIUM SERPL-SCNC: 4.9 MMOL/L (ref 3.5–5)
RBC # BLD: 5.22 M/UL (ref 3.5–5.5)
SODIUM BLD-SCNC: 142 MMOL/L (ref 132–146)
TOTAL PROTEIN: 8.1 G/DL (ref 6.4–8.3)
TRIGL SERPL-MCNC: 254 MG/DL
VLDLC SERPL CALC-MCNC: 51 MG/DL
WBC # BLD: 11.8 K/UL (ref 4.5–11.5)

## 2023-11-27 PROCEDURE — 99214 OFFICE O/P EST MOD 30 MIN: CPT | Performed by: PSYCHIATRY & NEUROLOGY

## 2023-11-27 PROCEDURE — 3078F DIAST BP <80 MM HG: CPT | Performed by: PSYCHIATRY & NEUROLOGY

## 2023-11-27 PROCEDURE — 3075F SYST BP GE 130 - 139MM HG: CPT | Performed by: PSYCHIATRY & NEUROLOGY

## 2023-11-27 PROCEDURE — 1123F ACP DISCUSS/DSCN MKR DOCD: CPT | Performed by: PSYCHIATRY & NEUROLOGY

## 2023-11-27 RX ORDER — DONEPEZIL HYDROCHLORIDE 10 MG/1
10 TABLET, FILM COATED ORAL NIGHTLY
Qty: 90 TABLET | Refills: 1 | Status: SHIPPED | OUTPATIENT
Start: 2023-11-27

## 2023-11-27 RX ORDER — DULOXETIN HYDROCHLORIDE 30 MG/1
30 CAPSULE, DELAYED RELEASE ORAL DAILY
Qty: 30 CAPSULE | Refills: 5 | Status: SHIPPED | OUTPATIENT
Start: 2023-11-27

## 2023-11-27 RX ORDER — MEMANTINE HYDROCHLORIDE 10 MG/1
10 TABLET ORAL 2 TIMES DAILY
Qty: 60 TABLET | Refills: 5 | Status: SHIPPED | OUTPATIENT
Start: 2023-12-26

## 2023-11-27 RX ORDER — MEMANTINE HYDROCHLORIDE 5 MG/1
5 TABLET ORAL 2 TIMES DAILY
Qty: 60 TABLET | Refills: 0 | Status: SHIPPED
Start: 2023-11-27 | End: 2023-12-01

## 2023-11-27 NOTE — PATIENT INSTRUCTIONS
Continue aricept 10 mg at night  Add namenda titrated over a month to 10 mg twice a day      Continue b12, vit d    Increase cymbalta to 30 mg a day  If joint pain worsens, falls worsen or developing a headache- need to call  Monthly labs      Priti Pina MD

## 2023-11-27 NOTE — PROGRESS NOTES
Omega Rosales MD       Lang Moya is a 80 y.o. female presenting as a follow patient for a   Chief Complaint   Patient presents with    Memory Loss    Follow-up      Chronicity: : 5 plus years  Examples: has memory loss,   Presently has no motivation, hygiene is a concern    Treatment: aricept 10 mg qhs  On cymbalta 20 mg q daily         Lives with   Daughter lives downstairs  ADL: goes to restroom by herself  Needs help to go to bath  Can get dressed, but stays in her Pj's  Does not do simple meals or coffee     Caretakers , daughter  Cooking:  does it  Driving:  does it  Finances: autopay by , daughter  Medications: daughter organizes it. Mood: depressed, withdrawn and no motivation  Sleep: ok  No dreams  Balance: legs are weaker. Falls: 1 fall in last m,onth   Uses walker     Hallucinations: none  Tremors: none  Bladder control: ok. No accidents      Family history of dementia: none      Treatment: aricept 10 mg qhs   Cymbalta 20 mg q daily  B12, vit d    Work up:  Mri brain:IMPRESSION:  1. No acute intracranial abnormality. 2. Moderate generalized cerebral volume loss with mild chronic microvascular  ischemic changes.       Polyarthralgia:   Pain control better  With cymbalta 20 mg q daily      C/o pain all over on walking  C/o pain in left shoulder, hips     Component      Latest Ref Rng 9/25/2023 10/27/2023   Cholesterol, Total      <200 mg/dL  193    HDL Cholesterol      >40 mg/dL  39 (L)    LDL Cholesterol      <100 mg/dL  102 (H)    Triglycerides      <150 mg/dL  260 (H)    VLDL      mg/dL  52    Vitamin B1,Whole Blood      70 - 180 nmol/L 152     Vitamin B-12      211 - 946 pg/mL 307     FOLATE, FOLAT      4.8 - 24.2 ng/mL 14.1     Sed Rate      0 - 20 mm/Hr 58 (H)     CRP      0 - 5 mg/L 10.0 (H)     JANIA      NEGATIVE  NEGATIVE     Vit D, 25-Hydroxy      30.0 - 100.0 ng/mL 29.3 (L)     TSH      0.27 - 4.20 uIU/mL 2.33  1.84    T4 Free      0.9 - 1.7

## 2023-12-01 ENCOUNTER — OFFICE VISIT (OUTPATIENT)
Dept: FAMILY MEDICINE CLINIC | Age: 83
End: 2023-12-01

## 2023-12-01 VITALS
HEART RATE: 63 BPM | OXYGEN SATURATION: 96 % | DIASTOLIC BLOOD PRESSURE: 70 MMHG | BODY MASS INDEX: 34.39 KG/M2 | SYSTOLIC BLOOD PRESSURE: 130 MMHG | TEMPERATURE: 97 F | WEIGHT: 182 LBS

## 2023-12-01 DIAGNOSIS — E11.9 TYPE 2 DIABETES MELLITUS WITHOUT COMPLICATION, WITHOUT LONG-TERM CURRENT USE OF INSULIN (HCC): ICD-10-CM

## 2023-12-01 DIAGNOSIS — E03.9 ACQUIRED HYPOTHYROIDISM: ICD-10-CM

## 2023-12-01 DIAGNOSIS — G30.9 ALZHEIMER'S DISEASE, UNSPECIFIED (CODE) (HCC): ICD-10-CM

## 2023-12-01 DIAGNOSIS — N18.30 STAGE 3 CHRONIC KIDNEY DISEASE, UNSPECIFIED WHETHER STAGE 3A OR 3B CKD (HCC): ICD-10-CM

## 2023-12-01 DIAGNOSIS — G89.29 CHRONIC LEFT SHOULDER PAIN: ICD-10-CM

## 2023-12-01 DIAGNOSIS — E78.49 OTHER HYPERLIPIDEMIA: ICD-10-CM

## 2023-12-01 DIAGNOSIS — R73.9 HYPERGLYCEMIA: ICD-10-CM

## 2023-12-01 DIAGNOSIS — I10 ESSENTIAL HYPERTENSION: Primary | ICD-10-CM

## 2023-12-01 DIAGNOSIS — M25.512 CHRONIC LEFT SHOULDER PAIN: ICD-10-CM

## 2023-12-01 DIAGNOSIS — F33.9 RECURRENT MAJOR DEPRESSIVE DISORDER, REMISSION STATUS UNSPECIFIED (HCC): ICD-10-CM

## 2023-12-01 RX ORDER — AMOXICILLIN 500 MG/1
CAPSULE ORAL
COMMUNITY
Start: 2023-09-26

## 2023-12-01 RX ORDER — EMPAGLIFLOZIN 25 MG/1
25 TABLET, FILM COATED ORAL DAILY
COMMUNITY
Start: 2023-11-01

## 2023-12-01 NOTE — PATIENT INSTRUCTIONS
Continue present treatment  Get the x-ray of the shoulder done today  Use the Voltaren gel  Warm compresses to the shoulder  Low-sodium low-fat low-carb diet  Follow-up with the consultants  Return to clinic earlier if any problems

## 2023-12-09 ENCOUNTER — APPOINTMENT (OUTPATIENT)
Dept: GENERAL RADIOLOGY | Age: 83
DRG: 195 | End: 2023-12-09
Payer: MEDICARE

## 2023-12-09 ENCOUNTER — HOSPITAL ENCOUNTER (INPATIENT)
Age: 83
LOS: 4 days | Discharge: NURSING, REHAB OR LONG-TERM CARE | DRG: 195 | End: 2023-12-14
Attending: STUDENT IN AN ORGANIZED HEALTH CARE EDUCATION/TRAINING PROGRAM | Admitting: FAMILY MEDICINE
Payer: MEDICARE

## 2023-12-09 DIAGNOSIS — J11.1 INFLUENZA: Primary | ICD-10-CM

## 2023-12-09 PROCEDURE — 85025 COMPLETE CBC W/AUTO DIFF WBC: CPT

## 2023-12-09 PROCEDURE — 93005 ELECTROCARDIOGRAM TRACING: CPT | Performed by: STUDENT IN AN ORGANIZED HEALTH CARE EDUCATION/TRAINING PROGRAM

## 2023-12-09 PROCEDURE — 71045 X-RAY EXAM CHEST 1 VIEW: CPT

## 2023-12-09 PROCEDURE — 87635 SARS-COV-2 COVID-19 AMP PRB: CPT

## 2023-12-09 PROCEDURE — 51701 INSERT BLADDER CATHETER: CPT

## 2023-12-09 PROCEDURE — 84484 ASSAY OF TROPONIN QUANT: CPT

## 2023-12-09 PROCEDURE — 83880 ASSAY OF NATRIURETIC PEPTIDE: CPT

## 2023-12-09 PROCEDURE — 80053 COMPREHEN METABOLIC PANEL: CPT

## 2023-12-09 PROCEDURE — 99285 EMERGENCY DEPT VISIT HI MDM: CPT

## 2023-12-09 PROCEDURE — 87502 INFLUENZA DNA AMP PROBE: CPT

## 2023-12-09 ASSESSMENT — LIFESTYLE VARIABLES
HOW OFTEN DO YOU HAVE A DRINK CONTAINING ALCOHOL: NEVER
HOW MANY STANDARD DRINKS CONTAINING ALCOHOL DO YOU HAVE ON A TYPICAL DAY: PATIENT DOES NOT DRINK

## 2023-12-10 ENCOUNTER — APPOINTMENT (OUTPATIENT)
Dept: CT IMAGING | Age: 83
DRG: 195 | End: 2023-12-10
Payer: MEDICARE

## 2023-12-10 PROBLEM — E11.65 TYPE 2 DIABETES MELLITUS WITH HYPERGLYCEMIA (HCC): Status: RESOLVED | Noted: 2023-04-20 | Resolved: 2023-12-10

## 2023-12-10 PROBLEM — J96.01 ACUTE RESPIRATORY FAILURE WITH HYPOXIA (HCC): Status: ACTIVE | Noted: 2023-12-10

## 2023-12-10 PROBLEM — N17.9 ACUTE KIDNEY INJURY (HCC): Status: RESOLVED | Noted: 2023-02-14 | Resolved: 2023-12-10

## 2023-12-10 PROBLEM — M47.816 LUMBAR SPONDYLOSIS: Status: RESOLVED | Noted: 2018-07-03 | Resolved: 2023-12-10

## 2023-12-10 PROBLEM — E11.9 TYPE 2 DIABETES MELLITUS (HCC): Status: RESOLVED | Noted: 2022-02-11 | Resolved: 2023-12-10

## 2023-12-10 PROBLEM — J10.1 INFLUENZA A: Status: ACTIVE | Noted: 2023-12-10

## 2023-12-10 PROBLEM — R78.81 POSITIVE BLOOD CULTURE: Status: RESOLVED | Noted: 2023-02-15 | Resolved: 2023-12-10

## 2023-12-10 PROBLEM — J12.82 PNEUMONIA DUE TO COVID-19 VIRUS: Status: RESOLVED | Noted: 2023-02-14 | Resolved: 2023-12-10

## 2023-12-10 PROBLEM — U07.1 PNEUMONIA DUE TO COVID-19 VIRUS: Status: RESOLVED | Noted: 2023-02-14 | Resolved: 2023-12-10

## 2023-12-10 LAB
ALBUMIN SERPL-MCNC: 4.1 G/DL (ref 3.5–5.2)
ALP SERPL-CCNC: 55 U/L (ref 35–104)
ALT SERPL-CCNC: 15 U/L (ref 0–32)
ANION GAP SERPL CALCULATED.3IONS-SCNC: 13 MMOL/L (ref 7–16)
AST SERPL-CCNC: 27 U/L (ref 0–31)
BASOPHILS # BLD: 0.18 K/UL (ref 0–0.2)
BASOPHILS NFR BLD: 2 % (ref 0–2)
BILIRUB SERPL-MCNC: 0.3 MG/DL (ref 0–1.2)
BILIRUB UR QL STRIP: NEGATIVE
BNP SERPL-MCNC: 1788 PG/ML (ref 0–450)
BUN SERPL-MCNC: 27 MG/DL (ref 6–23)
CALCIUM SERPL-MCNC: 9.6 MG/DL (ref 8.6–10.2)
CHLORIDE SERPL-SCNC: 99 MMOL/L (ref 98–107)
CLARITY UR: CLEAR
CO2 SERPL-SCNC: 24 MMOL/L (ref 22–29)
COLOR UR: YELLOW
CREAT SERPL-MCNC: 1.4 MG/DL (ref 0.5–1)
EKG ATRIAL RATE: 77 BPM
EKG P AXIS: 54 DEGREES
EKG P-R INTERVAL: 194 MS
EKG Q-T INTERVAL: 392 MS
EKG QRS DURATION: 74 MS
EKG QTC CALCULATION (BAZETT): 443 MS
EKG R AXIS: 18 DEGREES
EKG T AXIS: 94 DEGREES
EKG VENTRICULAR RATE: 77 BPM
EOSINOPHIL # BLD: 0.18 K/UL (ref 0.05–0.5)
EOSINOPHILS RELATIVE PERCENT: 2 % (ref 0–6)
EPI CELLS #/AREA URNS HPF: ABNORMAL /HPF
ERYTHROCYTE [DISTWIDTH] IN BLOOD BY AUTOMATED COUNT: 13.9 % (ref 11.5–15)
GFR SERPL CREATININE-BSD FRML MDRD: 37 ML/MIN/1.73M2
GLUCOSE BLD-MCNC: 106 MG/DL (ref 74–99)
GLUCOSE BLD-MCNC: 111 MG/DL (ref 74–99)
GLUCOSE BLD-MCNC: 121 MG/DL (ref 74–99)
GLUCOSE BLD-MCNC: 90 MG/DL (ref 74–99)
GLUCOSE SERPL-MCNC: 168 MG/DL (ref 74–99)
GLUCOSE UR STRIP-MCNC: >=1000 MG/DL
HCT VFR BLD AUTO: 40.7 % (ref 34–48)
HGB BLD-MCNC: 13 G/DL (ref 11.5–15.5)
HGB UR QL STRIP.AUTO: NEGATIVE
INFLUENZA A BY PCR: DETECTED
INFLUENZA B BY PCR: NOT DETECTED
KETONES UR STRIP-MCNC: NEGATIVE MG/DL
LEUKOCYTE ESTERASE UR QL STRIP: NEGATIVE
LYMPHOCYTES NFR BLD: 0.36 K/UL (ref 1.5–4)
LYMPHOCYTES RELATIVE PERCENT: 4 % (ref 20–42)
MCH RBC QN AUTO: 26.9 PG (ref 26–35)
MCHC RBC AUTO-ENTMCNC: 31.9 G/DL (ref 32–34.5)
MCV RBC AUTO: 84.3 FL (ref 80–99.9)
MONOCYTES NFR BLD: 0.36 K/UL (ref 0.1–0.95)
MONOCYTES NFR BLD: 4 % (ref 2–12)
NEUTROPHILS NFR BLD: 90 % (ref 43–80)
NEUTS SEG NFR BLD: 9.31 K/UL (ref 1.8–7.3)
NITRITE UR QL STRIP: NEGATIVE
PH UR STRIP: 5.5 [PH] (ref 5–9)
PLATELET # BLD AUTO: 213 K/UL (ref 130–450)
PMV BLD AUTO: 10.8 FL (ref 7–12)
POTASSIUM SERPL-SCNC: 4.3 MMOL/L (ref 3.5–5)
PROT SERPL-MCNC: 7.3 G/DL (ref 6.4–8.3)
PROT UR STRIP-MCNC: ABNORMAL MG/DL
RBC # BLD AUTO: 4.83 M/UL (ref 3.5–5.5)
RBC # BLD: NORMAL 10*6/UL
RBC #/AREA URNS HPF: ABNORMAL /HPF
SARS-COV-2 RDRP RESP QL NAA+PROBE: NOT DETECTED
SODIUM SERPL-SCNC: 136 MMOL/L (ref 132–146)
SP GR UR STRIP: 1.01 (ref 1–1.03)
SPECIMEN DESCRIPTION: NORMAL
TROPONIN I SERPL HS-MCNC: 18 NG/L (ref 0–9)
UROBILINOGEN UR STRIP-ACNC: 0.2 EU/DL (ref 0–1)
WBC #/AREA URNS HPF: ABNORMAL /HPF
WBC OTHER # BLD: 10.4 K/UL (ref 4.5–11.5)

## 2023-12-10 PROCEDURE — 6360000002 HC RX W HCPCS: Performed by: FAMILY MEDICINE

## 2023-12-10 PROCEDURE — 2580000003 HC RX 258: Performed by: FAMILY MEDICINE

## 2023-12-10 PROCEDURE — 70450 CT HEAD/BRAIN W/O DYE: CPT

## 2023-12-10 PROCEDURE — 94640 AIRWAY INHALATION TREATMENT: CPT

## 2023-12-10 PROCEDURE — 82962 GLUCOSE BLOOD TEST: CPT

## 2023-12-10 PROCEDURE — 81001 URINALYSIS AUTO W/SCOPE: CPT

## 2023-12-10 PROCEDURE — 2700000000 HC OXYGEN THERAPY PER DAY

## 2023-12-10 PROCEDURE — 1200000000 HC SEMI PRIVATE

## 2023-12-10 PROCEDURE — 71250 CT THORAX DX C-: CPT

## 2023-12-10 PROCEDURE — 6370000000 HC RX 637 (ALT 250 FOR IP): Performed by: FAMILY MEDICINE

## 2023-12-10 PROCEDURE — 6370000000 HC RX 637 (ALT 250 FOR IP): Performed by: STUDENT IN AN ORGANIZED HEALTH CARE EDUCATION/TRAINING PROGRAM

## 2023-12-10 PROCEDURE — 99223 1ST HOSP IP/OBS HIGH 75: CPT | Performed by: FAMILY MEDICINE

## 2023-12-10 PROCEDURE — 93010 ELECTROCARDIOGRAM REPORT: CPT | Performed by: INTERNAL MEDICINE

## 2023-12-10 PROCEDURE — 2580000003 HC RX 258

## 2023-12-10 RX ORDER — DONEPEZIL HYDROCHLORIDE 5 MG/1
10 TABLET, FILM COATED ORAL NIGHTLY
Status: DISCONTINUED | OUTPATIENT
Start: 2023-12-10 | End: 2023-12-14 | Stop reason: HOSPADM

## 2023-12-10 RX ORDER — OSELTAMIVIR PHOSPHATE 30 MG/1
30 CAPSULE ORAL 2 TIMES DAILY
Status: DISCONTINUED | OUTPATIENT
Start: 2023-12-10 | End: 2023-12-14 | Stop reason: HOSPADM

## 2023-12-10 RX ORDER — GLUCAGON 1 MG/ML
1 KIT INJECTION PRN
Status: DISCONTINUED | OUTPATIENT
Start: 2023-12-10 | End: 2023-12-14 | Stop reason: HOSPADM

## 2023-12-10 RX ORDER — IPRATROPIUM BROMIDE AND ALBUTEROL SULFATE 2.5; .5 MG/3ML; MG/3ML
1 SOLUTION RESPIRATORY (INHALATION) EVERY 4 HOURS PRN
Status: DISCONTINUED | OUTPATIENT
Start: 2023-12-10 | End: 2023-12-14 | Stop reason: HOSPADM

## 2023-12-10 RX ORDER — SODIUM CHLORIDE 9 MG/ML
INJECTION, SOLUTION INTRAVENOUS PRN
Status: DISCONTINUED | OUTPATIENT
Start: 2023-12-10 | End: 2023-12-14 | Stop reason: HOSPADM

## 2023-12-10 RX ORDER — INSULIN LISPRO 100 [IU]/ML
0-4 INJECTION, SOLUTION INTRAVENOUS; SUBCUTANEOUS NIGHTLY
Status: DISCONTINUED | OUTPATIENT
Start: 2023-12-10 | End: 2023-12-14 | Stop reason: HOSPADM

## 2023-12-10 RX ORDER — ATENOLOL 25 MG/1
25 TABLET ORAL 2 TIMES DAILY
Status: DISCONTINUED | OUTPATIENT
Start: 2023-12-10 | End: 2023-12-14 | Stop reason: HOSPADM

## 2023-12-10 RX ORDER — OSELTAMIVIR PHOSPHATE 75 MG/1
75 CAPSULE ORAL ONCE
Status: COMPLETED | OUTPATIENT
Start: 2023-12-10 | End: 2023-12-10

## 2023-12-10 RX ORDER — INSULIN GLARGINE 100 [IU]/ML
12 INJECTION, SOLUTION SUBCUTANEOUS NIGHTLY
Status: DISCONTINUED | OUTPATIENT
Start: 2023-12-10 | End: 2023-12-14 | Stop reason: HOSPADM

## 2023-12-10 RX ORDER — MEMANTINE HYDROCHLORIDE 10 MG/1
10 TABLET ORAL 2 TIMES DAILY
Status: DISCONTINUED | OUTPATIENT
Start: 2023-12-26 | End: 2023-12-14 | Stop reason: HOSPADM

## 2023-12-10 RX ORDER — SODIUM CHLORIDE 0.9 % (FLUSH) 0.9 %
5-40 SYRINGE (ML) INJECTION PRN
Status: DISCONTINUED | OUTPATIENT
Start: 2023-12-10 | End: 2023-12-14 | Stop reason: HOSPADM

## 2023-12-10 RX ORDER — INSULIN LISPRO 100 [IU]/ML
0-4 INJECTION, SOLUTION INTRAVENOUS; SUBCUTANEOUS
Status: DISCONTINUED | OUTPATIENT
Start: 2023-12-10 | End: 2023-12-14 | Stop reason: HOSPADM

## 2023-12-10 RX ORDER — DEXTROSE MONOHYDRATE 100 MG/ML
INJECTION, SOLUTION INTRAVENOUS CONTINUOUS PRN
Status: DISCONTINUED | OUTPATIENT
Start: 2023-12-10 | End: 2023-12-14 | Stop reason: HOSPADM

## 2023-12-10 RX ORDER — LEVOTHYROXINE SODIUM 88 UG/1
88 TABLET ORAL DAILY
Status: DISCONTINUED | OUTPATIENT
Start: 2023-12-10 | End: 2023-12-14 | Stop reason: HOSPADM

## 2023-12-10 RX ORDER — SODIUM CHLORIDE 0.9 % (FLUSH) 0.9 %
5-40 SYRINGE (ML) INJECTION EVERY 12 HOURS SCHEDULED
Status: DISCONTINUED | OUTPATIENT
Start: 2023-12-10 | End: 2023-12-14 | Stop reason: HOSPADM

## 2023-12-10 RX ORDER — ACETAMINOPHEN 160 MG
2000 TABLET,DISINTEGRATING ORAL DAILY
COMMUNITY

## 2023-12-10 RX ORDER — AMLODIPINE BESYLATE 5 MG/1
5 TABLET ORAL DAILY
Status: DISCONTINUED | OUTPATIENT
Start: 2023-12-10 | End: 2023-12-14 | Stop reason: HOSPADM

## 2023-12-10 RX ORDER — ROSUVASTATIN CALCIUM 10 MG/1
5 TABLET, COATED ORAL NIGHTLY
Status: DISCONTINUED | OUTPATIENT
Start: 2023-12-10 | End: 2023-12-14 | Stop reason: HOSPADM

## 2023-12-10 RX ORDER — ACETAMINOPHEN 325 MG/1
650 TABLET ORAL EVERY 6 HOURS PRN
Status: DISCONTINUED | OUTPATIENT
Start: 2023-12-10 | End: 2023-12-14 | Stop reason: HOSPADM

## 2023-12-10 RX ORDER — GUAIFENESIN/DEXTROMETHORPHAN 100-10MG/5
5 SYRUP ORAL EVERY 4 HOURS PRN
Status: DISCONTINUED | OUTPATIENT
Start: 2023-12-10 | End: 2023-12-14 | Stop reason: HOSPADM

## 2023-12-10 RX ORDER — ACETAMINOPHEN 650 MG/1
650 SUPPOSITORY RECTAL EVERY 6 HOURS PRN
Status: DISCONTINUED | OUTPATIENT
Start: 2023-12-10 | End: 2023-12-14 | Stop reason: HOSPADM

## 2023-12-10 RX ORDER — ENOXAPARIN SODIUM 100 MG/ML
40 INJECTION SUBCUTANEOUS DAILY
Status: DISCONTINUED | OUTPATIENT
Start: 2023-12-10 | End: 2023-12-14 | Stop reason: HOSPADM

## 2023-12-10 RX ORDER — SODIUM CHLORIDE, SODIUM LACTATE, POTASSIUM CHLORIDE, CALCIUM CHLORIDE 600; 310; 30; 20 MG/100ML; MG/100ML; MG/100ML; MG/100ML
INJECTION, SOLUTION INTRAVENOUS
Status: COMPLETED
Start: 2023-12-10 | End: 2023-12-10

## 2023-12-10 RX ORDER — SODIUM CHLORIDE, SODIUM LACTATE, POTASSIUM CHLORIDE, CALCIUM CHLORIDE 600; 310; 30; 20 MG/100ML; MG/100ML; MG/100ML; MG/100ML
INJECTION, SOLUTION INTRAVENOUS CONTINUOUS
Status: ACTIVE | OUTPATIENT
Start: 2023-12-10 | End: 2023-12-10

## 2023-12-10 RX ORDER — DULOXETIN HYDROCHLORIDE 30 MG/1
30 CAPSULE, DELAYED RELEASE ORAL DAILY
Status: DISCONTINUED | OUTPATIENT
Start: 2023-12-10 | End: 2023-12-14 | Stop reason: HOSPADM

## 2023-12-10 RX ORDER — IPRATROPIUM BROMIDE AND ALBUTEROL SULFATE 2.5; .5 MG/3ML; MG/3ML
1 SOLUTION RESPIRATORY (INHALATION)
Status: DISCONTINUED | OUTPATIENT
Start: 2023-12-10 | End: 2023-12-10

## 2023-12-10 RX ADMIN — ATENOLOL 25 MG: 50 TABLET ORAL at 16:48

## 2023-12-10 RX ADMIN — OSELTAMIVIR PHOSPHATE 30 MG: 30 CAPSULE ORAL at 22:48

## 2023-12-10 RX ADMIN — Medication 10 ML: at 22:55

## 2023-12-10 RX ADMIN — SODIUM CHLORIDE, SODIUM LACTATE, POTASSIUM CHLORIDE, CALCIUM CHLORIDE: 600; 310; 30; 20 INJECTION, SOLUTION INTRAVENOUS at 05:52

## 2023-12-10 RX ADMIN — DULOXETINE HYDROCHLORIDE 30 MG: 30 CAPSULE, DELAYED RELEASE ORAL at 08:57

## 2023-12-10 RX ADMIN — Medication 10 ML: at 08:57

## 2023-12-10 RX ADMIN — LEVOTHYROXINE SODIUM 88 MCG: 0.09 TABLET ORAL at 08:57

## 2023-12-10 RX ADMIN — ENOXAPARIN SODIUM 40 MG: 100 INJECTION SUBCUTANEOUS at 08:57

## 2023-12-10 RX ADMIN — SODIUM CHLORIDE, POTASSIUM CHLORIDE, SODIUM LACTATE AND CALCIUM CHLORIDE: 600; 310; 30; 20 INJECTION, SOLUTION INTRAVENOUS at 05:52

## 2023-12-10 RX ADMIN — GUAIFENESIN AND DEXTROMETHORPHAN 5 ML: 100; 10 SYRUP ORAL at 14:23

## 2023-12-10 RX ADMIN — EMPAGLIFLOZIN 25 MG: 25 TABLET, FILM COATED ORAL at 08:57

## 2023-12-10 RX ADMIN — DONEPEZIL HYDROCHLORIDE 10 MG: 5 TABLET, FILM COATED ORAL at 22:48

## 2023-12-10 RX ADMIN — OSELTAMIVIR PHOSPHATE 30 MG: 30 CAPSULE ORAL at 08:56

## 2023-12-10 RX ADMIN — ATENOLOL 25 MG: 50 TABLET ORAL at 08:56

## 2023-12-10 RX ADMIN — IPRATROPIUM BROMIDE AND ALBUTEROL SULFATE 1 DOSE: .5; 2.5 SOLUTION RESPIRATORY (INHALATION) at 04:52

## 2023-12-10 RX ADMIN — AMLODIPINE BESYLATE 5 MG: 5 TABLET ORAL at 08:56

## 2023-12-10 RX ADMIN — ROSUVASTATIN CALCIUM 5 MG: 10 TABLET, FILM COATED ORAL at 22:48

## 2023-12-10 RX ADMIN — OSELTAMIVIR PHOSPHATE 75 MG: 75 CAPSULE ORAL at 01:11

## 2023-12-10 NOTE — ED PROVIDER NOTES
HPI   69-year-old female patient presents emergency for evaluation of possible urinary tract infection. She is coming from home, she is having associated generalized weakness inability to ambulate. As per patient she states she does not know why she is here although she is alert and oriented x 3. No complaints of pain anywhere. No known falls. She is not on any blood thinners. Review of Systems   Unable to perform ROS: Mental status change      Physical Exam  Vitals and nursing note reviewed. Constitutional:       General: She is not in acute distress. HENT:      Head: Normocephalic and atraumatic. Nose: Nose normal. No congestion. Mouth/Throat:      Mouth: Mucous membranes are moist.      Pharynx: Oropharynx is clear. Eyes:      Conjunctiva/sclera: Conjunctivae normal.   Cardiovascular:      Rate and Rhythm: Normal rate and regular rhythm. Pulses: Normal pulses. Heart sounds: Normal heart sounds. Pulmonary:      Effort: Pulmonary effort is normal.      Breath sounds: Normal breath sounds. Abdominal:      General: There is no distension. Tenderness: There is no abdominal tenderness. Musculoskeletal:         General: Normal range of motion. Cervical back: Neck supple. Right lower leg: No edema. Left lower leg: No edema. Skin:     General: Skin is warm. Capillary Refill: Capillary refill takes less than 2 seconds. Neurological:      General: No focal deficit present. Mental Status: She is alert and oriented to person, place, and time. Comments: Oriented x 3, moving all extremities. No focal deficits. Procedures     MDM  Patient presented to emergency department for evaluation of generalized weakness, inability to ambulate. Broad differential diagnosis including UTI, pneumonia, viral illness, dehydration, electrolyte abnormality. She was found to be positive for influenza A. No evidence of UTI noted.   No leukocyte esterase or

## 2023-12-10 NOTE — RT PROTOCOL NOTE
using Per Protocol order mode. 7-10 - enter or revise RT Bronchodilator order(s) to two equivalent RT bronchodilator orders with one order with TID Frequency and one order with Frequency of every 4 hours PRN wheezing or increased work of breathing using Per Protocol order mode. 11-13 - enter or revise RT Bronchodilator order(s) to one equivalent RT bronchodilator order with QID Frequency and an Albuterol order with Frequency of every 4 hours PRN wheezing or increased work of breathing using Per Protocol order mode. Greater than 13 - enter or revise RT Bronchodilator order(s) to one equivalent RT bronchodilator order with every 4 hours Frequency and an Albuterol order with Frequency of every 2 hours PRN wheezing or increased work of breathing using Per Protocol order mode. RT to enter RT Home Evaluation for COPD & MDI Assessment order using Per Protocol order mode.     Electronically signed by Yuniel Chavez RCP on 12/10/2023 at 10:36 AM

## 2023-12-10 NOTE — ED NOTES
Straight cath completed; 400 ml urine output. Urine sample sent to lab.      Cheryl Gross RN  12/10/23 1403

## 2023-12-10 NOTE — H&P
condition->Emergency Medical Condition (MA) FINDINGS: BRAIN/VENTRICLES: There is no acute intracranial hemorrhage, mass effect or midline shift. No abnormal extra-axial fluid collection. The gray-white differentiation is maintained without evidence of an acute infarct. There is no evidence of hydrocephalus. Atrophy and periventricular white matter disease. ORBITS: The visualized portion of the orbits demonstrate no acute abnormality. SINUSES: The visualized paranasal sinuses and mastoid air cells demonstrate no acute abnormality. SOFT TISSUES/SKULL:  No acute abnormality of the visualized skull or soft tissues. No acute intracranial abnormality. RECOMMENDATIONS: Careful clinical correlation and follow up recommended. XR CHEST PORTABLE    Result Date: 12/9/2023  EXAMINATION: ONE XRAY VIEW OF THE CHEST 12/9/2023 11:05 pm COMPARISON: 02/13/2023 HISTORY: ORDERING SYSTEM PROVIDED HISTORY: eval pna TECHNOLOGIST PROVIDED HISTORY: Reason for exam:->eval pna FINDINGS: There is minimal left basilar density concerning for pneumonitis and/or atelectasis. This process partially silhouettes the left heart border and diaphragm. The mid and upper lung fields appear clear. The pulmonary vasculature and heart size appears within the normal range. The aortic knob is heavily calcified. There is a poorly defined parenchymal density at the left lung base concerning for pneumonia and or atelectasis       EKG: no recent EKG in EMR    ASSESSMENT:      Principal Problem:    Acute respiratory failure with hypoxia (HCC)  Active Problems:    Stage 3b chronic kidney disease (HCC)    Type 2 diabetes mellitus with chronic kidney disease    Essential hypertension    Acquired hypothyroidism    Alzheimer's disease, unspecified    Influenza A  Resolved Problems:    * No resolved hospital problems.  *      PLAN:    Acute hypoxic respiratory failure  Influenza A  - duonebs  - Tamiflu  - O2 to keep SaO2 > 92%    Type 2 DM  - SSI  -

## 2023-12-11 LAB
ANION GAP SERPL CALCULATED.3IONS-SCNC: 12 MMOL/L (ref 7–16)
BASOPHILS # BLD: 0.04 K/UL (ref 0–0.2)
BASOPHILS NFR BLD: 1 % (ref 0–2)
BUN SERPL-MCNC: 26 MG/DL (ref 6–23)
CALCIUM SERPL-MCNC: 9 MG/DL (ref 8.6–10.2)
CHLORIDE SERPL-SCNC: 98 MMOL/L (ref 98–107)
CO2 SERPL-SCNC: 24 MMOL/L (ref 22–29)
CREAT SERPL-MCNC: 1.2 MG/DL (ref 0.5–1)
EOSINOPHIL # BLD: 0.06 K/UL (ref 0.05–0.5)
EOSINOPHILS RELATIVE PERCENT: 1 % (ref 0–6)
ERYTHROCYTE [DISTWIDTH] IN BLOOD BY AUTOMATED COUNT: 13.9 % (ref 11.5–15)
GFR SERPL CREATININE-BSD FRML MDRD: 43 ML/MIN/1.73M2
GLUCOSE BLD-MCNC: 107 MG/DL (ref 74–99)
GLUCOSE BLD-MCNC: 143 MG/DL (ref 74–99)
GLUCOSE BLD-MCNC: 200 MG/DL (ref 74–99)
GLUCOSE BLD-MCNC: 265 MG/DL (ref 74–99)
GLUCOSE SERPL-MCNC: 203 MG/DL (ref 74–99)
HCT VFR BLD AUTO: 43.1 % (ref 34–48)
HGB BLD-MCNC: 13.7 G/DL (ref 11.5–15.5)
IMM GRANULOCYTES # BLD AUTO: <0.03 K/UL (ref 0–0.58)
IMM GRANULOCYTES NFR BLD: 0 % (ref 0–5)
LYMPHOCYTES NFR BLD: 0.99 K/UL (ref 1.5–4)
LYMPHOCYTES RELATIVE PERCENT: 16 % (ref 20–42)
MAGNESIUM SERPL-MCNC: 1.9 MG/DL (ref 1.6–2.6)
MCH RBC QN AUTO: 26.9 PG (ref 26–35)
MCHC RBC AUTO-ENTMCNC: 31.8 G/DL (ref 32–34.5)
MCV RBC AUTO: 84.7 FL (ref 80–99.9)
MONOCYTES NFR BLD: 0.92 K/UL (ref 0.1–0.95)
MONOCYTES NFR BLD: 15 % (ref 2–12)
NEUTROPHILS NFR BLD: 68 % (ref 43–80)
NEUTS SEG NFR BLD: 4.27 K/UL (ref 1.8–7.3)
PHOSPHATE SERPL-MCNC: 3.8 MG/DL (ref 2.5–4.5)
PLATELET # BLD AUTO: 178 K/UL (ref 130–450)
PMV BLD AUTO: 10.7 FL (ref 7–12)
POTASSIUM SERPL-SCNC: 4.2 MMOL/L (ref 3.5–5)
RBC # BLD AUTO: 5.09 M/UL (ref 3.5–5.5)
SODIUM SERPL-SCNC: 134 MMOL/L (ref 132–146)
WBC OTHER # BLD: 6.3 K/UL (ref 4.5–11.5)

## 2023-12-11 PROCEDURE — 83735 ASSAY OF MAGNESIUM: CPT

## 2023-12-11 PROCEDURE — 6360000002 HC RX W HCPCS: Performed by: FAMILY MEDICINE

## 2023-12-11 PROCEDURE — 84100 ASSAY OF PHOSPHORUS: CPT

## 2023-12-11 PROCEDURE — 36415 COLL VENOUS BLD VENIPUNCTURE: CPT

## 2023-12-11 PROCEDURE — 2580000003 HC RX 258: Performed by: FAMILY MEDICINE

## 2023-12-11 PROCEDURE — 85025 COMPLETE CBC W/AUTO DIFF WBC: CPT

## 2023-12-11 PROCEDURE — 99232 SBSQ HOSP IP/OBS MODERATE 35: CPT | Performed by: INTERNAL MEDICINE

## 2023-12-11 PROCEDURE — 80048 BASIC METABOLIC PNL TOTAL CA: CPT

## 2023-12-11 PROCEDURE — 82962 GLUCOSE BLOOD TEST: CPT

## 2023-12-11 PROCEDURE — 2700000000 HC OXYGEN THERAPY PER DAY

## 2023-12-11 PROCEDURE — 6370000000 HC RX 637 (ALT 250 FOR IP): Performed by: FAMILY MEDICINE

## 2023-12-11 PROCEDURE — 1200000000 HC SEMI PRIVATE

## 2023-12-11 RX ADMIN — ENOXAPARIN SODIUM 40 MG: 100 INJECTION SUBCUTANEOUS at 10:05

## 2023-12-11 RX ADMIN — INSULIN GLARGINE 12 UNITS: 100 INJECTION, SOLUTION SUBCUTANEOUS at 21:27

## 2023-12-11 RX ADMIN — DONEPEZIL HYDROCHLORIDE 10 MG: 5 TABLET, FILM COATED ORAL at 20:47

## 2023-12-11 RX ADMIN — ATENOLOL 25 MG: 50 TABLET ORAL at 10:05

## 2023-12-11 RX ADMIN — Medication 10 ML: at 21:28

## 2023-12-11 RX ADMIN — ROSUVASTATIN CALCIUM 5 MG: 10 TABLET, FILM COATED ORAL at 20:47

## 2023-12-11 RX ADMIN — ACETAMINOPHEN 650 MG: 325 TABLET ORAL at 20:47

## 2023-12-11 RX ADMIN — LEVOTHYROXINE SODIUM 88 MCG: 0.09 TABLET ORAL at 05:54

## 2023-12-11 RX ADMIN — AMLODIPINE BESYLATE 5 MG: 5 TABLET ORAL at 10:06

## 2023-12-11 RX ADMIN — OSELTAMIVIR PHOSPHATE 30 MG: 30 CAPSULE ORAL at 20:47

## 2023-12-11 RX ADMIN — OSELTAMIVIR PHOSPHATE 30 MG: 30 CAPSULE ORAL at 10:04

## 2023-12-11 RX ADMIN — DULOXETINE HYDROCHLORIDE 30 MG: 30 CAPSULE, DELAYED RELEASE ORAL at 10:05

## 2023-12-11 RX ADMIN — EMPAGLIFLOZIN 25 MG: 25 TABLET, FILM COATED ORAL at 10:05

## 2023-12-11 RX ADMIN — Medication 10 ML: at 10:06

## 2023-12-11 RX ADMIN — ATENOLOL 25 MG: 50 TABLET ORAL at 17:21

## 2023-12-11 ASSESSMENT — PAIN DESCRIPTION - DESCRIPTORS: DESCRIPTORS: ACHING;SORE;DISCOMFORT

## 2023-12-11 ASSESSMENT — PAIN DESCRIPTION - LOCATION: LOCATION: GENERALIZED

## 2023-12-11 ASSESSMENT — PAIN SCALES - GENERAL: PAINLEVEL_OUTOF10: 5

## 2023-12-11 NOTE — ACP (ADVANCE CARE PLANNING)
Advance Care Planning   Healthcare Decision Maker:    Primary Decision Maker: shane hilario - Child - [phone]    Secondary Decision Maker: Romana Boeck - Spouse - [phone]    Secondary Decision Maker: Marryan Farias - Child - [phone]    Today we documented Decision Maker(s) consistent with Legal Next of Kin hierarchy.

## 2023-12-11 NOTE — PLAN OF CARE
Problem: Skin/Tissue Integrity  Goal: Absence of new skin breakdown  Description: 1. Monitor for areas of redness and/or skin breakdown  2. Assess vascular access sites hourly  3. Every 4-6 hours minimum:  Change oxygen saturation probe site  4. Every 4-6 hours:  If on nasal continuous positive airway pressure, respiratory therapy assess nares and determine need for appliance change or resting period.   12/10/2023 1902 by Neela Peralta RN  Outcome: Progressing     Problem: Safety - Adult  Goal: Free from fall injury  12/11/2023 0210 by Marquis Donnie RN  Outcome: Progressing     Problem: ABCDS Injury Assessment  Goal: Absence of physical injury  12/11/2023 0210 by Marquis Donnie RN  Outcome: Progressing

## 2023-12-12 PROBLEM — I10 PRIMARY HYPERTENSION: Status: ACTIVE | Noted: 2023-12-12

## 2023-12-12 PROBLEM — E03.9 HYPOTHYROIDISM: Status: ACTIVE | Noted: 2023-12-12

## 2023-12-12 LAB
ALBUMIN SERPL-MCNC: 3.4 G/DL (ref 3.5–5.2)
ALP SERPL-CCNC: 48 U/L (ref 35–104)
ALT SERPL-CCNC: 23 U/L (ref 0–32)
ANION GAP SERPL CALCULATED.3IONS-SCNC: 12 MMOL/L (ref 7–16)
AST SERPL-CCNC: 35 U/L (ref 0–31)
BASOPHILS # BLD: 0.04 K/UL (ref 0–0.2)
BASOPHILS NFR BLD: 1 % (ref 0–2)
BILIRUB SERPL-MCNC: 0.3 MG/DL (ref 0–1.2)
BUN SERPL-MCNC: 33 MG/DL (ref 6–23)
CALCIUM SERPL-MCNC: 9.2 MG/DL (ref 8.6–10.2)
CHLORIDE SERPL-SCNC: 100 MMOL/L (ref 98–107)
CO2 SERPL-SCNC: 25 MMOL/L (ref 22–29)
CREAT SERPL-MCNC: 1.4 MG/DL (ref 0.5–1)
EOSINOPHIL # BLD: 0.09 K/UL (ref 0.05–0.5)
EOSINOPHILS RELATIVE PERCENT: 2 % (ref 0–6)
ERYTHROCYTE [DISTWIDTH] IN BLOOD BY AUTOMATED COUNT: 14 % (ref 11.5–15)
GFR SERPL CREATININE-BSD FRML MDRD: 36 ML/MIN/1.73M2
GLUCOSE BLD-MCNC: 163 MG/DL (ref 74–99)
GLUCOSE BLD-MCNC: 210 MG/DL (ref 74–99)
GLUCOSE BLD-MCNC: 229 MG/DL (ref 74–99)
GLUCOSE BLD-MCNC: 250 MG/DL (ref 74–99)
GLUCOSE SERPL-MCNC: 177 MG/DL (ref 74–99)
HCT VFR BLD AUTO: 45.3 % (ref 34–48)
HGB BLD-MCNC: 14.1 G/DL (ref 11.5–15.5)
IMM GRANULOCYTES # BLD AUTO: <0.03 K/UL (ref 0–0.58)
IMM GRANULOCYTES NFR BLD: 0 % (ref 0–5)
LYMPHOCYTES NFR BLD: 1.03 K/UL (ref 1.5–4)
LYMPHOCYTES RELATIVE PERCENT: 18 % (ref 20–42)
MCH RBC QN AUTO: 26.7 PG (ref 26–35)
MCHC RBC AUTO-ENTMCNC: 31.1 G/DL (ref 32–34.5)
MCV RBC AUTO: 85.8 FL (ref 80–99.9)
MONOCYTES NFR BLD: 0.72 K/UL (ref 0.1–0.95)
MONOCYTES NFR BLD: 12 % (ref 2–12)
NEUTROPHILS NFR BLD: 68 % (ref 43–80)
NEUTS SEG NFR BLD: 3.96 K/UL (ref 1.8–7.3)
PLATELET # BLD AUTO: 202 K/UL (ref 130–450)
PMV BLD AUTO: 11.1 FL (ref 7–12)
POTASSIUM SERPL-SCNC: 4.3 MMOL/L (ref 3.5–5)
PROT SERPL-MCNC: 7.2 G/DL (ref 6.4–8.3)
RBC # BLD AUTO: 5.28 M/UL (ref 3.5–5.5)
SODIUM SERPL-SCNC: 137 MMOL/L (ref 132–146)
WBC OTHER # BLD: 5.9 K/UL (ref 4.5–11.5)

## 2023-12-12 PROCEDURE — 2700000000 HC OXYGEN THERAPY PER DAY

## 2023-12-12 PROCEDURE — 82962 GLUCOSE BLOOD TEST: CPT

## 2023-12-12 PROCEDURE — 1200000000 HC SEMI PRIVATE

## 2023-12-12 PROCEDURE — 6370000000 HC RX 637 (ALT 250 FOR IP): Performed by: FAMILY MEDICINE

## 2023-12-12 PROCEDURE — 6360000002 HC RX W HCPCS: Performed by: FAMILY MEDICINE

## 2023-12-12 PROCEDURE — 80053 COMPREHEN METABOLIC PANEL: CPT

## 2023-12-12 PROCEDURE — 97530 THERAPEUTIC ACTIVITIES: CPT

## 2023-12-12 PROCEDURE — 97165 OT EVAL LOW COMPLEX 30 MIN: CPT

## 2023-12-12 PROCEDURE — 99232 SBSQ HOSP IP/OBS MODERATE 35: CPT | Performed by: INTERNAL MEDICINE

## 2023-12-12 PROCEDURE — 97535 SELF CARE MNGMENT TRAINING: CPT

## 2023-12-12 PROCEDURE — 97110 THERAPEUTIC EXERCISES: CPT | Performed by: PHYSICAL THERAPIST

## 2023-12-12 PROCEDURE — 36415 COLL VENOUS BLD VENIPUNCTURE: CPT

## 2023-12-12 PROCEDURE — 2580000003 HC RX 258: Performed by: FAMILY MEDICINE

## 2023-12-12 PROCEDURE — 97161 PT EVAL LOW COMPLEX 20 MIN: CPT | Performed by: PHYSICAL THERAPIST

## 2023-12-12 PROCEDURE — 85025 COMPLETE CBC W/AUTO DIFF WBC: CPT

## 2023-12-12 PROCEDURE — 97530 THERAPEUTIC ACTIVITIES: CPT | Performed by: PHYSICAL THERAPIST

## 2023-12-12 RX ADMIN — DONEPEZIL HYDROCHLORIDE 10 MG: 5 TABLET, FILM COATED ORAL at 21:11

## 2023-12-12 RX ADMIN — LEVOTHYROXINE SODIUM 88 MCG: 0.09 TABLET ORAL at 05:01

## 2023-12-12 RX ADMIN — ENOXAPARIN SODIUM 40 MG: 100 INJECTION SUBCUTANEOUS at 08:52

## 2023-12-12 RX ADMIN — INSULIN LISPRO 1 UNITS: 100 INJECTION, SOLUTION INTRAVENOUS; SUBCUTANEOUS at 16:39

## 2023-12-12 RX ADMIN — OSELTAMIVIR PHOSPHATE 30 MG: 30 CAPSULE ORAL at 21:11

## 2023-12-12 RX ADMIN — ATENOLOL 25 MG: 50 TABLET ORAL at 08:53

## 2023-12-12 RX ADMIN — OSELTAMIVIR PHOSPHATE 30 MG: 30 CAPSULE ORAL at 08:53

## 2023-12-12 RX ADMIN — INSULIN LISPRO 1 UNITS: 100 INJECTION, SOLUTION INTRAVENOUS; SUBCUTANEOUS at 12:16

## 2023-12-12 RX ADMIN — Medication 10 ML: at 08:53

## 2023-12-12 RX ADMIN — Medication 5 ML: at 21:10

## 2023-12-12 RX ADMIN — ATENOLOL 25 MG: 50 TABLET ORAL at 16:39

## 2023-12-12 RX ADMIN — AMLODIPINE BESYLATE 5 MG: 5 TABLET ORAL at 08:53

## 2023-12-12 RX ADMIN — ROSUVASTATIN CALCIUM 5 MG: 10 TABLET, FILM COATED ORAL at 21:11

## 2023-12-12 RX ADMIN — DULOXETINE HYDROCHLORIDE 30 MG: 30 CAPSULE, DELAYED RELEASE ORAL at 08:56

## 2023-12-12 RX ADMIN — INSULIN GLARGINE 12 UNITS: 100 INJECTION, SOLUTION SUBCUTANEOUS at 21:12

## 2023-12-12 RX ADMIN — EMPAGLIFLOZIN 25 MG: 25 TABLET, FILM COATED ORAL at 08:53

## 2023-12-12 ASSESSMENT — PAIN SCALES - GENERAL
PAINLEVEL_OUTOF10: 0
PAINLEVEL_OUTOF10: 0

## 2023-12-12 NOTE — DISCHARGE INSTR - COC
patient):  None    RN SIGNATURE:  Electronically signed by Jina Vernon RN on 12/14/23 at 11:11 AM EST    CASE MANAGEMENT/SOCIAL WORK SECTION    Inpatient Status Date: 12-    Readmission Risk Assessment Score:  Readmission Risk              Risk of Unplanned Readmission:  17           Discharging to Facility/ Agency   Name: [facility]  Address: [address] [place] [address]  Phone: [phone]  Fax:    Dialysis Facility (if applicable)   Name:  Address:  Dialysis Schedule:  Phone:  Fax:    / signature: Electronically signed by Rangel Yeung RN on 12/12/23 at 3:13 PM EST    PHYSICIAN SECTION    Prognosis: Good    Condition at Discharge: Stable    Rehab Potential (if transferring to Rehab): Good    Recommended Labs or Other Treatments After Discharge: PT/OT, complete course of Tamiflu as prescribed     Physician Certification: I certify the above information and transfer of [facility] [place]  is necessary for the continuing treatment of the diagnosis listed and that she requires [address] for less 30 days.      Update Admission H&P: Changes in H&P as follows - see discharge summary    PHYSICIAN SIGNATURE:  Electronically signed by Georges Felder DO on 12/13/23 at 12:31 PM EST

## 2023-12-13 PROBLEM — R06.89 ACUTE RESPIRATORY INSUFFICIENCY: Status: ACTIVE | Noted: 2023-12-10

## 2023-12-13 LAB
ALBUMIN SERPL-MCNC: 3.3 G/DL (ref 3.5–5.2)
ALP SERPL-CCNC: 51 U/L (ref 35–104)
ALT SERPL-CCNC: 17 U/L (ref 0–32)
ANION GAP SERPL CALCULATED.3IONS-SCNC: 14 MMOL/L (ref 7–16)
AST SERPL-CCNC: 25 U/L (ref 0–31)
BASOPHILS # BLD: 0.04 K/UL (ref 0–0.2)
BASOPHILS NFR BLD: 1 % (ref 0–2)
BILIRUB SERPL-MCNC: 0.3 MG/DL (ref 0–1.2)
BUN SERPL-MCNC: 35 MG/DL (ref 6–23)
CALCIUM SERPL-MCNC: 9.1 MG/DL (ref 8.6–10.2)
CHLORIDE SERPL-SCNC: 99 MMOL/L (ref 98–107)
CO2 SERPL-SCNC: 22 MMOL/L (ref 22–29)
CREAT SERPL-MCNC: 1.3 MG/DL (ref 0.5–1)
EOSINOPHIL # BLD: 0.1 K/UL (ref 0.05–0.5)
EOSINOPHILS RELATIVE PERCENT: 2 % (ref 0–6)
ERYTHROCYTE [DISTWIDTH] IN BLOOD BY AUTOMATED COUNT: 14 % (ref 11.5–15)
GFR SERPL CREATININE-BSD FRML MDRD: 40 ML/MIN/1.73M2
GLUCOSE BLD-MCNC: 174 MG/DL (ref 74–99)
GLUCOSE BLD-MCNC: 180 MG/DL (ref 74–99)
GLUCOSE BLD-MCNC: 229 MG/DL (ref 74–99)
GLUCOSE BLD-MCNC: 287 MG/DL (ref 74–99)
GLUCOSE SERPL-MCNC: 184 MG/DL (ref 74–99)
HCT VFR BLD AUTO: 44 % (ref 34–48)
HGB BLD-MCNC: 13.7 G/DL (ref 11.5–15.5)
IMM GRANULOCYTES # BLD AUTO: <0.03 K/UL (ref 0–0.58)
IMM GRANULOCYTES NFR BLD: 0 % (ref 0–5)
LYMPHOCYTES NFR BLD: 1.07 K/UL (ref 1.5–4)
LYMPHOCYTES RELATIVE PERCENT: 16 % (ref 20–42)
MCH RBC QN AUTO: 26.1 PG (ref 26–35)
MCHC RBC AUTO-ENTMCNC: 31.1 G/DL (ref 32–34.5)
MCV RBC AUTO: 84 FL (ref 80–99.9)
MONOCYTES NFR BLD: 0.69 K/UL (ref 0.1–0.95)
MONOCYTES NFR BLD: 10 % (ref 2–12)
NEUTROPHILS NFR BLD: 71 % (ref 43–80)
NEUTS SEG NFR BLD: 4.71 K/UL (ref 1.8–7.3)
PLATELET # BLD AUTO: 196 K/UL (ref 130–450)
PMV BLD AUTO: 11.2 FL (ref 7–12)
POTASSIUM SERPL-SCNC: 4.3 MMOL/L (ref 3.5–5)
PROT SERPL-MCNC: 7.2 G/DL (ref 6.4–8.3)
RBC # BLD AUTO: 5.24 M/UL (ref 3.5–5.5)
SODIUM SERPL-SCNC: 135 MMOL/L (ref 132–146)
WBC OTHER # BLD: 6.6 K/UL (ref 4.5–11.5)

## 2023-12-13 PROCEDURE — 6360000002 HC RX W HCPCS: Performed by: FAMILY MEDICINE

## 2023-12-13 PROCEDURE — 2580000003 HC RX 258: Performed by: FAMILY MEDICINE

## 2023-12-13 PROCEDURE — 80053 COMPREHEN METABOLIC PANEL: CPT

## 2023-12-13 PROCEDURE — 85025 COMPLETE CBC W/AUTO DIFF WBC: CPT

## 2023-12-13 PROCEDURE — 6370000000 HC RX 637 (ALT 250 FOR IP): Performed by: FAMILY MEDICINE

## 2023-12-13 PROCEDURE — 99232 SBSQ HOSP IP/OBS MODERATE 35: CPT | Performed by: INTERNAL MEDICINE

## 2023-12-13 PROCEDURE — 1200000000 HC SEMI PRIVATE

## 2023-12-13 PROCEDURE — 36415 COLL VENOUS BLD VENIPUNCTURE: CPT

## 2023-12-13 PROCEDURE — 82962 GLUCOSE BLOOD TEST: CPT

## 2023-12-13 RX ORDER — ENOXAPARIN SODIUM 100 MG/ML
40 INJECTION SUBCUTANEOUS DAILY
DISCHARGE
Start: 2023-12-14

## 2023-12-13 RX ORDER — INSULIN LISPRO 100 [IU]/ML
0-4 INJECTION, SOLUTION INTRAVENOUS; SUBCUTANEOUS NIGHTLY
DISCHARGE
Start: 2023-12-13

## 2023-12-13 RX ORDER — IPRATROPIUM BROMIDE AND ALBUTEROL SULFATE 2.5; .5 MG/3ML; MG/3ML
3 SOLUTION RESPIRATORY (INHALATION) EVERY 4 HOURS PRN
Qty: 360 ML | DISCHARGE
Start: 2023-12-13

## 2023-12-13 RX ORDER — OSELTAMIVIR PHOSPHATE 30 MG/1
30 CAPSULE ORAL 2 TIMES DAILY
Qty: 3 CAPSULE | Refills: 0 | DISCHARGE
Start: 2023-12-13 | End: 2023-12-15

## 2023-12-13 RX ORDER — GUAIFENESIN/DEXTROMETHORPHAN 100-10MG/5
5 SYRUP ORAL EVERY 4 HOURS PRN
Qty: 120 ML | DISCHARGE
Start: 2023-12-13 | End: 2023-12-23

## 2023-12-13 RX ORDER — INSULIN LISPRO 100 [IU]/ML
0-4 INJECTION, SOLUTION INTRAVENOUS; SUBCUTANEOUS
DISCHARGE
Start: 2023-12-13

## 2023-12-13 RX ORDER — GLUCAGON 1 MG/ML
1 KIT INJECTION PRN
Qty: 1 EACH | DISCHARGE
Start: 2023-12-13

## 2023-12-13 RX ADMIN — OSELTAMIVIR PHOSPHATE 30 MG: 30 CAPSULE ORAL at 21:20

## 2023-12-13 RX ADMIN — AMLODIPINE BESYLATE 5 MG: 5 TABLET ORAL at 11:43

## 2023-12-13 RX ADMIN — OSELTAMIVIR PHOSPHATE 30 MG: 30 CAPSULE ORAL at 11:43

## 2023-12-13 RX ADMIN — Medication 10 ML: at 21:21

## 2023-12-13 RX ADMIN — DULOXETINE HYDROCHLORIDE 30 MG: 30 CAPSULE, DELAYED RELEASE ORAL at 11:43

## 2023-12-13 RX ADMIN — DONEPEZIL HYDROCHLORIDE 10 MG: 5 TABLET, FILM COATED ORAL at 21:20

## 2023-12-13 RX ADMIN — ENOXAPARIN SODIUM 40 MG: 100 INJECTION SUBCUTANEOUS at 11:43

## 2023-12-13 RX ADMIN — INSULIN GLARGINE 12 UNITS: 100 INJECTION, SOLUTION SUBCUTANEOUS at 21:27

## 2023-12-13 RX ADMIN — ATENOLOL 25 MG: 50 TABLET ORAL at 16:44

## 2023-12-13 RX ADMIN — ROSUVASTATIN CALCIUM 5 MG: 10 TABLET, FILM COATED ORAL at 21:20

## 2023-12-13 RX ADMIN — INSULIN LISPRO 1 UNITS: 100 INJECTION, SOLUTION INTRAVENOUS; SUBCUTANEOUS at 16:44

## 2023-12-13 RX ADMIN — LEVOTHYROXINE SODIUM 88 MCG: 0.09 TABLET ORAL at 06:40

## 2023-12-13 RX ADMIN — ATENOLOL 25 MG: 50 TABLET ORAL at 11:43

## 2023-12-13 RX ADMIN — INSULIN LISPRO 2 UNITS: 100 INJECTION, SOLUTION INTRAVENOUS; SUBCUTANEOUS at 11:50

## 2023-12-13 RX ADMIN — EMPAGLIFLOZIN 25 MG: 25 TABLET, FILM COATED ORAL at 11:43

## 2023-12-13 RX ADMIN — Medication 10 ML: at 11:45

## 2023-12-13 NOTE — ACP (ADVANCE CARE PLANNING)
12-13-Cm note: family wants to take pt home with PT/OT only, they chose [facility], order obtained, they have accepted pt , they will call family with appt time. Family will transport home.  Electronically signed by Devika Yousif RN on 12/13/2023 at 1:24 PM

## 2023-12-13 NOTE — DISCHARGE INSTRUCTIONS
Influenza (Flu): Care Instructions  Overview     Influenza (flu) is an infection in the lungs and breathing passages. It is caused by the influenza virus. There are different strains, or types, of the flu virus from year to year. Unlike the common cold, the flu comes on suddenly and the symptoms can be more severe. These symptoms include a cough, congestion, fever, chills, fatigue, aches, and pains. These symptoms may last for a few weeks. Although the flu can make you feel very sick, it usually doesn't cause serious health problems. Home treatment is usually all you need for flu symptoms. But your doctor may prescribe antiviral medicine to prevent other health problems, such as pneumonia, from developing. The risk of other health problems from the flu is highest for young children (under 2), older adults (over 72), pregnant women, and people with long-term health conditions. Follow-up care is a key part of your treatment and safety. Be sure to make and go to all appointments, and call your doctor if you are having problems. It's also a good idea to know your test results and keep a list of the medicines you take. How can you care for yourself at home? Get plenty of rest.  Drink plenty of fluids. If you have to limit fluids because of a health problem, talk with your doctor before you increase the amount of fluids you drink. Take an over-the-counter pain medicine if needed, such as acetaminophen (Tylenol), ibuprofen (Advil, Motrin), or naproxen (Aleve), to relieve fever, headache, and muscle aches. Be safe with medicines. Read and follow all instructions on the label. No one younger than 20 should take aspirin. It has been linked to Reye syndrome, a serious illness. Take any prescribed medicine exactly as directed. Do not smoke. Smoking can make the flu worse. If you need help quitting, talk to your doctor about stop-smoking programs and medicines.  These can increase your chances of quitting for

## 2023-12-14 VITALS
RESPIRATION RATE: 19 BRPM | HEIGHT: 62 IN | SYSTOLIC BLOOD PRESSURE: 119 MMHG | WEIGHT: 182 LBS | BODY MASS INDEX: 33.49 KG/M2 | OXYGEN SATURATION: 93 % | TEMPERATURE: 97.9 F | DIASTOLIC BLOOD PRESSURE: 78 MMHG | HEART RATE: 65 BPM

## 2023-12-14 LAB
GLUCOSE BLD-MCNC: 179 MG/DL (ref 74–99)
GLUCOSE BLD-MCNC: 200 MG/DL (ref 74–99)

## 2023-12-14 PROCEDURE — 2580000003 HC RX 258: Performed by: FAMILY MEDICINE

## 2023-12-14 PROCEDURE — 97535 SELF CARE MNGMENT TRAINING: CPT

## 2023-12-14 PROCEDURE — 6370000000 HC RX 637 (ALT 250 FOR IP): Performed by: FAMILY MEDICINE

## 2023-12-14 PROCEDURE — 6360000002 HC RX W HCPCS: Performed by: FAMILY MEDICINE

## 2023-12-14 PROCEDURE — 99239 HOSP IP/OBS DSCHRG MGMT >30: CPT | Performed by: INTERNAL MEDICINE

## 2023-12-14 PROCEDURE — 82962 GLUCOSE BLOOD TEST: CPT

## 2023-12-14 PROCEDURE — 97530 THERAPEUTIC ACTIVITIES: CPT

## 2023-12-14 RX ADMIN — DULOXETINE HYDROCHLORIDE 30 MG: 30 CAPSULE, DELAYED RELEASE ORAL at 11:32

## 2023-12-14 RX ADMIN — Medication 10 ML: at 11:33

## 2023-12-14 RX ADMIN — INSULIN LISPRO 1 UNITS: 100 INJECTION, SOLUTION INTRAVENOUS; SUBCUTANEOUS at 12:28

## 2023-12-14 RX ADMIN — OSELTAMIVIR PHOSPHATE 30 MG: 30 CAPSULE ORAL at 11:32

## 2023-12-14 RX ADMIN — ENOXAPARIN SODIUM 40 MG: 100 INJECTION SUBCUTANEOUS at 11:33

## 2023-12-14 RX ADMIN — LEVOTHYROXINE SODIUM 88 MCG: 0.09 TABLET ORAL at 05:25

## 2023-12-14 RX ADMIN — ATENOLOL 25 MG: 50 TABLET ORAL at 11:32

## 2023-12-14 RX ADMIN — AMLODIPINE BESYLATE 5 MG: 5 TABLET ORAL at 11:32

## 2023-12-14 RX ADMIN — EMPAGLIFLOZIN 25 MG: 25 TABLET, FILM COATED ORAL at 11:32

## 2023-12-14 NOTE — CARE COORDINATION
12-12- note: placed a call to pt's dtr Rick Yoder , pt lives with her  who is a pt here at [facility] in ICU, pt's dtr Wing Carter lives in the home in a separate area but she is sick as well with the Flu, per Rick Yoder. Cm went over the list of facilities in [address] over the phone, she spoke to the family , they chose [facility] 1st, referral made, will await response, 2nd choice [facility], 3rd choice [facility]. Will call 2nd and 3rd choices once [facility] calls back. For SNF will need COURTNEY BRAVO, signed IMELDA.  Electronically signed by Ashok Smith RN on 12/12/2023 at 1:41 PM
12-12-Cm note: pt has been accepted to [facility] , they are starting [address] [place] [address], pt will need a signed [place] (initiated) and [address] [place] [address]. Cm following.  Electronically signed by Yonathan Rene RN on 12/12/2023 at 3:09 PM
12-12-Cm ntoe: [facility]  has no female beds, referral called to [facility] , will await response. 3rd choice [facility] (not called ), PRECERT required.  Electronically signed by Sravanthi Kelley RN on 12/12/2023 at 2:14 PM
12-13-Cm note: [facility] no private rooms, pt's dtr Josy Falcon [facility], Harvinder Monk has been obtained. Cm following for dc, family will transport if [facility] can't. Will need a signed IMELDA , COURTNEY (initiated).  Electronically signed by Ben Floyd RN on 12/13/2023 at 12:00 PM
12/14/2023 1016 CM note: Discharge order noted. Per nursing, pt's family now requesting SNF for skilled rehab. [facility] and obtained [facility]. Pt's dtr Jonathon Amaya updated on above and agreeable to plan. Jonathon Amaya aware that CM received phone call from her brother this am requesting another facility. Dtr Jonathon Amaya informed [facility] accepted pt, obtained precert and has private room available. Jonathon Amaya agreeable to [facility] SNF. Arrangements made for pt to transport to SNF at 12pm via PAS w/c transport. Per Nelia Simpson at [facility], [place] will cover w/c transport cost-pt's dtr Jonathon Amaya informed of this and of transport arrangements. HENS done,IMELDA is signed. N-N [id number]. TOMMY Palmer RN
26-75-ER note: PRECERT has been obtained for cinthya Bridges received a call from maxim Silveira, [facility], referral called, will await response.  Electronically signed by Andrey Sofia RN on 12/13/2023 at 10:12 AM
current housing: unknown at this time     Financial    Payor: Mary Bullock / Plan: Sahil Guzman PPO / Product Type: Medicare /     Does insurance require precert for SNF: Yes      [facility] [address] [place], [place] - [address] [place] [address] - P [phone] Cinthia Lemon [phone]  [address]  Phone: [phone] Fax: [address], [facility] [phone] - F [phone]  [address] [place] [address]. Daisy Dowell [address]  Phone: [phone] Fax: [phone]      Notes:    Factors facilitating achievement of predicted outcomes: Family support    Barriers to discharge:   Pt's  in ED after fall at home, broke nose, pt's dtr Dejah Marquez lives in pt's home she is ill as well. Additional Case Management Notes: 12-11-Cm notes: met with pt who seems confused, called dtr Dejah Marquez, left message, called  (is in ED) , called dtr Ahsan Peña , pt has alzheimers/dementia , she states pt has an [employer]walker, [address] [address] chair, toilet riser, walk in bathtub with bench. Pt is wearing oxygen at this time , she does Not wear o2 at home, per dtr Ahsan Peña, pt'd dtr Dejah Marquez lives in the pt's downstairs  , she is ill as well, pt's  in in the ED here from a fall at home, PT/OT ordered, will await recommendations, pt has no hx of SNF. PRECERT needed for SNF.  Electronically signed by Jluis Randle RN on 12/11/2023 at 4:01 PM      The Plan for Transition of Care is related to the following treatment goals of Influenza [J11.1]  Acute respiratory failure with hypoxia ([address]) [J96.01]      Jluis Randle RN  Case Management Department  Ph: [phone] Fax: [phone]

## 2023-12-14 NOTE — DISCHARGE SUMMARY
[facility] Physician Discharge Summary       Andre Shaw MD  [address]  [address]  [facility] [phone]    Call        Activity level: Up with assistance    Diet: ADULT DIET; Regular    Labs: Per primary care physician    Condition at discharge: Stable    Dispo: [facility] for subacute rehab        Patient ID:  Derian Contreras  [id number]  80 y.o.  1940    Admit date: 12/9/2023    Discharge date and time:  12/14/2023  9:37 AM    Admission Diagnoses: Principal Problem:    Acute respiratory insufficiency  Active Problems:    Stage 3b chronic kidney disease ([address])    Type 2 diabetes mellitus with chronic kidney disease    Essential hypertension    Acquired hypothyroidism    Alzheimer's disease, unspecified    Uncontrolled type 2 diabetes mellitus with hyperglycemia ([address])    Influenza A    Hypothyroidism    Primary hypertension  Resolved Problems:    * No resolved hospital problems. *      Discharge Diagnoses: Principal Problem:    Acute respiratory insufficiency  Active Problems:    Stage 3b chronic kidney disease (HCC)    Type 2 diabetes mellitus with chronic kidney disease    Essential hypertension    Acquired hypothyroidism    Alzheimer's disease, unspecified    Uncontrolled type 2 diabetes mellitus with hyperglycemia ([address])    Influenza A    Hypothyroidism    Primary hypertension  Resolved Problems:    * No resolved hospital problems. *      Consults:  IP CONSULT TO SOCIAL WORK  IP CONSULT TO SOCIAL WORK    Procedures: None    Hospital Course: This is an 29-year-old female that was admitted to the hospital for acute respiratory insufficiency due to influenza A. Treated with oseltamavir, weaned off of oxygen and breathing comfortably on room air at time of discharge. Home medications for chronic conditions continued, and these remained stable. PT/OT evaluated and treated, recommended subacute rehab.   Stable for discharge to

## 2023-12-28 ENCOUNTER — OFFICE VISIT (OUTPATIENT)
Dept: FAMILY MEDICINE CLINIC | Age: 83
End: 2023-12-28
Payer: MEDICARE

## 2023-12-28 VITALS
BODY MASS INDEX: 32.92 KG/M2 | TEMPERATURE: 97.4 F | SYSTOLIC BLOOD PRESSURE: 124 MMHG | OXYGEN SATURATION: 96 % | HEART RATE: 71 BPM | WEIGHT: 180 LBS | DIASTOLIC BLOOD PRESSURE: 70 MMHG

## 2023-12-28 DIAGNOSIS — R05.2 SUBACUTE COUGH: ICD-10-CM

## 2023-12-28 DIAGNOSIS — G89.29 CHRONIC LEFT SHOULDER PAIN: ICD-10-CM

## 2023-12-28 DIAGNOSIS — I10 ESSENTIAL HYPERTENSION: Primary | ICD-10-CM

## 2023-12-28 DIAGNOSIS — G30.9 ALZHEIMER'S DISEASE, UNSPECIFIED (CODE) (HCC): ICD-10-CM

## 2023-12-28 DIAGNOSIS — E11.9 TYPE 2 DIABETES MELLITUS WITHOUT COMPLICATION, WITHOUT LONG-TERM CURRENT USE OF INSULIN (HCC): ICD-10-CM

## 2023-12-28 DIAGNOSIS — M25.512 CHRONIC LEFT SHOULDER PAIN: ICD-10-CM

## 2023-12-28 DIAGNOSIS — E78.49 OTHER HYPERLIPIDEMIA: ICD-10-CM

## 2023-12-28 DIAGNOSIS — E11.65 TYPE 2 DIABETES MELLITUS WITH HYPERGLYCEMIA, WITHOUT LONG-TERM CURRENT USE OF INSULIN (HCC): ICD-10-CM

## 2023-12-28 DIAGNOSIS — N18.30 STAGE 3 CHRONIC KIDNEY DISEASE, UNSPECIFIED WHETHER STAGE 3A OR 3B CKD (HCC): ICD-10-CM

## 2023-12-28 PROBLEM — F02.80 ALZHEIMER DISEASE (HCC): Status: ACTIVE | Noted: 2023-12-16

## 2023-12-28 PROBLEM — M48.061 SPINAL STENOSIS, LUMBAR REGION WITHOUT NEUROGENIC CLAUDICATION: Status: ACTIVE | Noted: 2023-12-16

## 2023-12-28 PROBLEM — Z79.4 LONG TERM (CURRENT) USE OF INSULIN (HCC): Status: ACTIVE | Noted: 2023-12-16

## 2023-12-28 PROCEDURE — 1123F ACP DISCUSS/DSCN MKR DOCD: CPT | Performed by: FAMILY MEDICINE

## 2023-12-28 PROCEDURE — 3078F DIAST BP <80 MM HG: CPT | Performed by: FAMILY MEDICINE

## 2023-12-28 PROCEDURE — 3046F HEMOGLOBIN A1C LEVEL >9.0%: CPT | Performed by: FAMILY MEDICINE

## 2023-12-28 PROCEDURE — 3074F SYST BP LT 130 MM HG: CPT | Performed by: FAMILY MEDICINE

## 2023-12-28 PROCEDURE — 99214 OFFICE O/P EST MOD 30 MIN: CPT | Performed by: FAMILY MEDICINE

## 2023-12-28 RX ORDER — GLIPIZIDE 10 MG/1
10 TABLET, FILM COATED, EXTENDED RELEASE ORAL DAILY
COMMUNITY

## 2023-12-28 RX ORDER — LANOLIN ALCOHOL/MO/W.PET/CERES
1000 CREAM (GRAM) TOPICAL DAILY
COMMUNITY

## 2023-12-28 NOTE — PROGRESS NOTES
OFFICE PROGRESS NOTE      SUBJECTIVE:        Patient ID:   Donis Peterson is a 80 y.o. female who presents for   Chief Complaint   Patient presents with    Follow-Up from Hospital     Here for recheck  following hospital stay for Flu. Would like to discuss results of Lt shoulder xray. HPI:   Patient here following the hospital stay  Patient here with a family member  Patient in the hospital with. Viral infection  Patient x-ray chest was normal  X-ray of the shoulder showed degenerative arthritis  Patient blood pressure is 124/70          Prior to Visit Medications    Medication Sig Taking? Authorizing Provider   glipiZIDE (GLUCOTROL XL) 10 MG extended release tablet Take 1 tablet by mouth daily Yes Nae Rivero MD   vitamin B-12 (CYANOCOBALAMIN) 1000 MCG tablet Take 1 tablet by mouth daily Yes Nae Rivero MD   glucose 4 g chewable tablet Take 4 tablets by mouth as needed for Low blood sugar Yes Kale Bear,    Cholecalciferol (VITAMIN D3) 50 MCG (2000 UT) CAPS Take 1 capsule by mouth daily Yes Nae Rivero MD   JARDIANCE 25 MG tablet Take 1 tablet by mouth daily Yes Nae Rivero MD   diclofenac sodium (VOLTAREN) 1 % GEL Apply 2 g topically 4 times daily Yes Di Oshea MD   donepezil (ARICEPT) 10 MG tablet Take 1 tablet by mouth nightly Yes Felicitas Solis MD   DULoxetine (CYMBALTA) 30 MG extended release capsule Take 1 capsule by mouth daily Yes Felicitas Solis MD   memantine (NAMENDA) 10 MG tablet Take 1 tablet by mouth 2 times daily Yes Felicitas Solis MD   amLODIPine (NORVASC) 5 MG tablet Take 1 tablet by mouth daily Yes Di Oshea MD   rosuvastatin (CRESTOR) 5 MG tablet TAKE ONE TABLET BY MOUTH EVERY DAY Yes Di Oshea MD   atenolol (TENORMIN) 25 MG tablet Take 1 tablet by mouth in the morning and 1 tablet in the evening.  Yes Di Oshea MD   levothyroxine (SYNTHROID)

## 2023-12-28 NOTE — PATIENT INSTRUCTIONS
Take the medicine as prescribed  Follow-up with the consultant  Low-sodium low-fat low-carb  Continue taking the cough medicine  Follow-up with the home health  Lab work  Before the next visit  Return to clinic earlier if any problem

## 2024-01-03 DIAGNOSIS — E11.65 TYPE 2 DIABETES MELLITUS WITH HYPERGLYCEMIA, WITHOUT LONG-TERM CURRENT USE OF INSULIN (HCC): ICD-10-CM

## 2024-01-03 DIAGNOSIS — M35.3 POLYMYALGIA RHEUMATICA (HCC): ICD-10-CM

## 2024-01-03 DIAGNOSIS — N18.30 STAGE 3 CHRONIC KIDNEY DISEASE, UNSPECIFIED WHETHER STAGE 3A OR 3B CKD (HCC): ICD-10-CM

## 2024-01-03 LAB
ABSOLUTE IMMATURE GRANULOCYTE: 0.03 K/UL (ref 0–0.58)
ALBUMIN SERPL-MCNC: 4 G/DL (ref 3.5–5.2)
ALP BLD-CCNC: 56 U/L (ref 35–104)
ALT SERPL-CCNC: 11 U/L (ref 0–32)
ANION GAP SERPL CALCULATED.3IONS-SCNC: 15 MMOL/L (ref 7–16)
AST SERPL-CCNC: 17 U/L (ref 0–31)
BASOPHILS ABSOLUTE: 0.07 K/UL (ref 0–0.2)
BASOPHILS RELATIVE PERCENT: 1 % (ref 0–2)
BILIRUB SERPL-MCNC: 0.3 MG/DL (ref 0–1.2)
BUN BLDV-MCNC: 26 MG/DL (ref 6–23)
C-REACTIVE PROTEIN: 19 MG/L (ref 0–5)
CALCIUM SERPL-MCNC: 9.9 MG/DL (ref 8.6–10.2)
CHLORIDE BLD-SCNC: 100 MMOL/L (ref 98–107)
CHOLESTEROL: 205 MG/DL
CO2: 25 MMOL/L (ref 22–29)
CREAT SERPL-MCNC: 1.3 MG/DL (ref 0.5–1)
EOSINOPHILS ABSOLUTE: 0.32 K/UL (ref 0.05–0.5)
EOSINOPHILS RELATIVE PERCENT: 3 % (ref 0–6)
GFR SERPL CREATININE-BSD FRML MDRD: 42 ML/MIN/1.73M2
GLUCOSE BLD-MCNC: 181 MG/DL (ref 74–99)
HCT VFR BLD CALC: 43.2 % (ref 34–48)
HDLC SERPL-MCNC: 36 MG/DL
HEMOGLOBIN: 13.4 G/DL (ref 11.5–15.5)
IMMATURE GRANULOCYTES: 0 % (ref 0–5)
LDL CHOLESTEROL: 118 MG/DL
LYMPHOCYTES ABSOLUTE: 1.42 K/UL (ref 1.5–4)
LYMPHOCYTES RELATIVE PERCENT: 15 % (ref 20–42)
MCH RBC QN AUTO: 26.6 PG (ref 26–35)
MCHC RBC AUTO-ENTMCNC: 31 G/DL (ref 32–34.5)
MCV RBC AUTO: 85.7 FL (ref 80–99.9)
MONOCYTES ABSOLUTE: 0.79 K/UL (ref 0.1–0.95)
MONOCYTES RELATIVE PERCENT: 8 % (ref 2–12)
NEUTROPHILS ABSOLUTE: 7.11 K/UL (ref 1.8–7.3)
NEUTROPHILS RELATIVE PERCENT: 73 % (ref 43–80)
PDW BLD-RTO: 14.6 % (ref 11.5–15)
PLATELET # BLD: 215 K/UL (ref 130–450)
PMV BLD AUTO: 10.8 FL (ref 7–12)
POTASSIUM SERPL-SCNC: 4.5 MMOL/L (ref 3.5–5)
RBC # BLD: 5.04 M/UL (ref 3.5–5.5)
SEDIMENTATION RATE, ERYTHROCYTE: 54 MM/HR (ref 0–20)
SODIUM BLD-SCNC: 140 MMOL/L (ref 132–146)
TOTAL CK: 46 U/L (ref 20–180)
TOTAL PROTEIN: 7.7 G/DL (ref 6.4–8.3)
TRIGL SERPL-MCNC: 256 MG/DL
VLDLC SERPL CALC-MCNC: 51 MG/DL
WBC # BLD: 9.7 K/UL (ref 4.5–11.5)

## 2024-01-09 PROBLEM — J10.1 INFLUENZA A: Status: RESOLVED | Noted: 2023-12-10 | Resolved: 2024-01-09

## 2024-01-10 RX ORDER — MEMANTINE HYDROCHLORIDE 10 MG/1
10 TABLET ORAL 2 TIMES DAILY
Qty: 60 TABLET | Refills: 2 | Status: SHIPPED | OUTPATIENT
Start: 2024-01-10

## 2024-01-10 NOTE — TELEPHONE ENCOUNTER
The following medication has been approved and sent to your pharmacy    Requested Prescriptions     Signed Prescriptions Disp Refills    memantine (NAMENDA) 10 MG tablet 60 tablet 2     Sig: Take 1 tablet by mouth 2 times daily     Authorizing Provider: REBA ALVARADO

## 2024-01-12 ENCOUNTER — OFFICE VISIT (OUTPATIENT)
Dept: FAMILY MEDICINE CLINIC | Age: 84
End: 2024-01-12

## 2024-01-12 VITALS
OXYGEN SATURATION: 95 % | WEIGHT: 181 LBS | SYSTOLIC BLOOD PRESSURE: 118 MMHG | DIASTOLIC BLOOD PRESSURE: 60 MMHG | HEART RATE: 64 BPM | TEMPERATURE: 97 F | BODY MASS INDEX: 33.11 KG/M2

## 2024-01-12 DIAGNOSIS — E11.22 TYPE 2 DIABETES MELLITUS WITH CHRONIC KIDNEY DISEASE, WITHOUT LONG-TERM CURRENT USE OF INSULIN, UNSPECIFIED CKD STAGE (HCC): ICD-10-CM

## 2024-01-12 DIAGNOSIS — F33.9 RECURRENT MAJOR DEPRESSIVE DISORDER, REMISSION STATUS UNSPECIFIED (HCC): ICD-10-CM

## 2024-01-12 DIAGNOSIS — I10 ESSENTIAL HYPERTENSION: ICD-10-CM

## 2024-01-12 DIAGNOSIS — E11.65 TYPE 2 DIABETES MELLITUS WITH HYPERGLYCEMIA, WITHOUT LONG-TERM CURRENT USE OF INSULIN (HCC): ICD-10-CM

## 2024-01-12 DIAGNOSIS — N18.30 STAGE 3 CHRONIC KIDNEY DISEASE, UNSPECIFIED WHETHER STAGE 3A OR 3B CKD (HCC): ICD-10-CM

## 2024-01-12 DIAGNOSIS — N18.32 CHRONIC KIDNEY DISEASE, STAGE 3B (HCC): ICD-10-CM

## 2024-01-12 DIAGNOSIS — F03.93: ICD-10-CM

## 2024-01-12 DIAGNOSIS — E03.9 ACQUIRED HYPOTHYROIDISM: ICD-10-CM

## 2024-01-12 DIAGNOSIS — G30.9 ALZHEIMER'S DISEASE, UNSPECIFIED (CODE) (HCC): ICD-10-CM

## 2024-01-12 DIAGNOSIS — M35.3 POLYMYALGIA RHEUMATICA (HCC): Primary | ICD-10-CM

## 2024-01-12 PROBLEM — E78.5 HYPERLIPIDEMIA: Status: ACTIVE | Noted: 2023-12-16

## 2024-01-12 PROBLEM — I12.9 HYPERTENSIVE CHRONIC KIDNEY DISEASE: Status: ACTIVE | Noted: 2023-12-16

## 2024-01-12 RX ORDER — ATENOLOL 25 MG/1
25 TABLET ORAL 2 TIMES DAILY
Qty: 180 TABLET | Refills: 1 | Status: SHIPPED | OUTPATIENT
Start: 2024-01-12

## 2024-01-12 RX ORDER — LEVOTHYROXINE SODIUM 88 UG/1
88 TABLET ORAL DAILY
Qty: 90 TABLET | Refills: 1 | Status: SHIPPED | OUTPATIENT
Start: 2024-01-12

## 2024-01-12 NOTE — PATIENT INSTRUCTIONS
Continue present treatment  Low-sodium low-fat low-carb diet  Regular exercises  Follow with endocrinology  Lab work before the next visit  Return to clinic earlier if any problems

## 2024-01-12 NOTE — PROGRESS NOTES
range of motion.      Cervical back: Normal range of motion and neck supple.   Skin:     General: Skin is warm and dry.   Neurological:      Mental Status: She is alert and oriented to person, place, and time.   Psychiatric:         Thought Content: Thought content normal.            Labs :    Lab Results   Component Value Date    WBC 9.7 01/03/2024    HGB 13.4 01/03/2024    HCT 43.2 01/03/2024     01/03/2024    CHOL 205 (H) 01/03/2024    TRIG 256 (H) 01/03/2024    HDL 36 (L) 01/03/2024    ALT 11 01/03/2024    AST 17 01/03/2024     01/03/2024    K 4.5 01/03/2024     01/03/2024    CREATININE 1.3 (H) 01/03/2024    BUN 26 (H) 01/03/2024    CO2 25 01/03/2024    TSH 1.84 10/27/2023    INR 1.1 02/15/2023    LABA1C 10.4 (H) 10/27/2023     Lab Results   Component Value Date/Time    COLORU Yellow 12/10/2023 02:20 AM    NITRU NEGATIVE 12/10/2023 02:20 AM    GLUCOSEU >=1000 12/10/2023 02:20 AM    KETUA NEGATIVE 12/10/2023 02:20 AM    UROBILINOGEN 0.2 12/10/2023 02:20 AM    BILIRUBINUR NEGATIVE 12/10/2023 02:20 AM     No results found for: \"PSA\", \"CEA\", \"\", \"FA1864\", \"\"      Controlled Substances Monitoring:                                    ASSESSMENT     Patient Active Problem List    Diagnosis Date Noted    Type 2 diabetes mellitus with chronic kidney disease 08/05/2022    Stage 3b chronic kidney disease ([facility]) 05/05/2022    Polymyalgia rheumatica ([facility]) 01/12/2024    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

## 2024-01-22 NOTE — PROGRESS NOTES
Component      Latest Ref Rng 9/25/2023 1/3/2024   Sed Rate      0 - 20 mm/Hr 58 (H)  54 (H)    CRP      0 - 5 mg/L 10.0 (H)  19.0 (H)        Her inflammatory markers are trending higher.    Ideally needs prednisone- needs a quick f/u to discuss it      Anjali Matos MD

## 2024-01-26 NOTE — PROGRESS NOTES
Let patient's PCP know about climbing esr, crp- am recommending prednisone  But unable to reach patient or family      Anjali Matos MD

## 2024-04-29 ENCOUNTER — TELEPHONE (OUTPATIENT)
Dept: FAMILY MEDICINE CLINIC | Age: 84
End: 2024-04-29

## 2024-04-29 NOTE — TELEPHONE ENCOUNTER
Patient's daughter, Abril, called stating that someone from the [facility] LMOM to RS patient's AWV, and she wanted to inform Dr. Fitzpatrick that patient will not be returning since she has home hospice.    Last seen 1/12/2024  Next appt Visit date not found

## 2025-05-30 NOTE — PATIENT INSTRUCTIONS
Strict low-sodium low-fat low-carb diet  Regular exercises  Take the medication as prescribed  Return to clinic earlier if any problems no